# Patient Record
Sex: MALE | Race: WHITE | Employment: OTHER | URBAN - METROPOLITAN AREA
[De-identification: names, ages, dates, MRNs, and addresses within clinical notes are randomized per-mention and may not be internally consistent; named-entity substitution may affect disease eponyms.]

---

## 2020-01-10 PROBLEM — K40.90 INGUINAL HERNIA: Status: ACTIVE | Noted: 2020-01-10

## 2020-01-10 PROBLEM — G45.9 TIA (TRANSIENT ISCHEMIC ATTACK): Status: ACTIVE | Noted: 2019-09-10

## 2020-01-13 PROBLEM — R10.31 INGUINODYNIA, RIGHT: Status: ACTIVE | Noted: 2020-01-13

## 2020-12-16 ENCOUNTER — HOSPITAL ENCOUNTER (EMERGENCY)
Age: 45
Discharge: HOME | End: 2020-12-16
Attending: EMERGENCY MEDICINE
Payer: COMMERCIAL

## 2020-12-16 ENCOUNTER — APPOINTMENT (OUTPATIENT)
Dept: GENERAL RADIOLOGY | Age: 45
End: 2020-12-16
Attending: STUDENT IN AN ORGANIZED HEALTH CARE EDUCATION/TRAINING PROGRAM
Payer: COMMERCIAL

## 2020-12-16 VITALS
RESPIRATION RATE: 15 BRPM | SYSTOLIC BLOOD PRESSURE: 129 MMHG | OXYGEN SATURATION: 100 % | TEMPERATURE: 98.4 F | HEART RATE: 75 BPM | DIASTOLIC BLOOD PRESSURE: 76 MMHG

## 2020-12-16 DIAGNOSIS — M54.50 ACUTE BILATERAL LOW BACK PAIN WITHOUT SCIATICA: Primary | ICD-10-CM

## 2020-12-16 LAB
AMPHET UR QL SCN: NEGATIVE
BARBITURATES UR QL SCN: NEGATIVE
BENZODIAZ UR QL: NEGATIVE
CANNABINOIDS UR QL SCN: NEGATIVE
COCAINE UR QL SCN: NEGATIVE
METHADONE UR QL: NEGATIVE
OPIATES UR QL: NEGATIVE
PCP UR QL: NEGATIVE

## 2020-12-16 PROCEDURE — 74011250637 HC RX REV CODE- 250/637: Performed by: EMERGENCY MEDICINE

## 2020-12-16 PROCEDURE — 81003 URINALYSIS AUTO W/O SCOPE: CPT

## 2020-12-16 PROCEDURE — 74011250636 HC RX REV CODE- 250/636: Performed by: EMERGENCY MEDICINE

## 2020-12-16 PROCEDURE — 99283 EMERGENCY DEPT VISIT LOW MDM: CPT

## 2020-12-16 PROCEDURE — 80307 DRUG TEST PRSMV CHEM ANLYZR: CPT

## 2020-12-16 PROCEDURE — 96372 THER/PROPH/DIAG INJ SC/IM: CPT

## 2020-12-16 PROCEDURE — 72100 X-RAY EXAM L-S SPINE 2/3 VWS: CPT

## 2020-12-16 RX ORDER — KETOROLAC TROMETHAMINE 30 MG/ML
60 INJECTION, SOLUTION INTRAMUSCULAR; INTRAVENOUS
Status: COMPLETED | OUTPATIENT
Start: 2020-12-16 | End: 2020-12-16

## 2020-12-16 RX ORDER — OXYCODONE AND ACETAMINOPHEN 5; 325 MG/1; MG/1
1 TABLET ORAL
Status: COMPLETED | OUTPATIENT
Start: 2020-12-16 | End: 2020-12-16

## 2020-12-16 RX ORDER — METHOCARBAMOL 500 MG/1
500 TABLET, FILM COATED ORAL 3 TIMES DAILY
Qty: 21 TAB | Refills: 0 | Status: SHIPPED | OUTPATIENT
Start: 2020-12-16 | End: 2020-12-23

## 2020-12-16 RX ORDER — NAPROXEN 500 MG/1
500 TABLET ORAL 2 TIMES DAILY WITH MEALS
Qty: 20 TAB | Refills: 0 | Status: SHIPPED | OUTPATIENT
Start: 2020-12-16 | End: 2020-12-26

## 2020-12-16 RX ADMIN — KETOROLAC TROMETHAMINE 60 MG: 30 INJECTION, SOLUTION INTRAMUSCULAR at 14:44

## 2020-12-16 RX ADMIN — OXYCODONE HYDROCHLORIDE AND ACETAMINOPHEN 1 TABLET: 5; 325 TABLET ORAL at 16:00

## 2020-12-16 NOTE — DISCHARGE INSTRUCTIONS
X-rays show prior surgical hardware to be in correct position and no acute bony injury  Pain: Naprosyn  Muscle relaxer: Robaxin  Follow-up with Dr. Piper Duran with unrelieved or not improving pain

## 2020-12-16 NOTE — ED NOTES
I have reviewed discharge instructions with the patient. The patient verbalized understanding. Patient left ED via Discharge Method: ambulatory to Home with family  Opportunity for questions and clarification provided. Patient given 2 scripts. To continue your aftercare when you leave the hospital, you may receive an automated call from our care team to check in on how you are doing. This is a free service and part of our promise to provide the best care and service to meet your aftercare needs.  If you have questions, or wish to unsubscribe from this service please call [phone]. Thank you for Choosing our [facility] Emergency Department.

## 2020-12-30 ENCOUNTER — HOSPITAL ENCOUNTER (EMERGENCY)
Age: 45
Discharge: HOME | End: 2020-12-30
Attending: EMERGENCY MEDICINE
Payer: MEDICARE

## 2020-12-30 ENCOUNTER — APPOINTMENT (OUTPATIENT)
Dept: GENERAL RADIOLOGY | Age: 45
End: 2020-12-30
Attending: EMERGENCY MEDICINE
Payer: MEDICARE

## 2020-12-30 VITALS
HEART RATE: 101 BPM | WEIGHT: 276 LBS | DIASTOLIC BLOOD PRESSURE: 86 MMHG | RESPIRATION RATE: 19 BRPM | SYSTOLIC BLOOD PRESSURE: 124 MMHG | HEIGHT: 73 IN | OXYGEN SATURATION: 96 % | TEMPERATURE: 98.3 F | BODY MASS INDEX: 36.58 KG/M2

## 2020-12-30 DIAGNOSIS — S73.101A HIP SPRAIN, RIGHT, INITIAL ENCOUNTER: Primary | ICD-10-CM

## 2020-12-30 PROCEDURE — 99283 EMERGENCY DEPT VISIT LOW MDM: CPT

## 2020-12-30 PROCEDURE — 73502 X-RAY EXAM HIP UNI 2-3 VIEWS: CPT

## 2020-12-30 PROCEDURE — 96372 THER/PROPH/DIAG INJ SC/IM: CPT

## 2020-12-30 PROCEDURE — 74011250636 HC RX REV CODE- 250/636: Performed by: EMERGENCY MEDICINE

## 2020-12-30 RX ORDER — HYDROMORPHONE HYDROCHLORIDE 1 MG/ML
1 INJECTION, SOLUTION INTRAMUSCULAR; INTRAVENOUS; SUBCUTANEOUS ONCE
Status: COMPLETED | OUTPATIENT
Start: 2020-12-30 | End: 2020-12-30

## 2020-12-30 RX ORDER — HYDROCODONE BITARTRATE AND ACETAMINOPHEN 7.5; 325 MG/1; MG/1
1 TABLET ORAL
Qty: 12 TAB | Refills: 0 | Status: SHIPPED | OUTPATIENT
Start: 2020-12-30 | End: 2021-01-02

## 2020-12-30 RX ADMIN — HYDROMORPHONE HYDROCHLORIDE 1 MG: 1 INJECTION, SOLUTION INTRAMUSCULAR; INTRAVENOUS; SUBCUTANEOUS at 20:49

## 2020-12-30 NOTE — LETTER
[id number] [address] EMERGENCY DEPT 
[address] 
[place] [id number] [phone] Work/School Note Date: 12/30/2020 To Whom It May concern: 
 
Canelo Benavides was seen and treated today in the emergency room by the following provider(s): 
Attending Provider: Vani Hill DO. Canelo Benavides may return to work on 01/02/2020.  
 
Sincerely,

## 2020-12-31 NOTE — ED PROVIDER NOTES
Presents complaining of severe pain in the right hip that began yesterday when he was walking up his steps and felt a pop in the area. He denies any falls or other injuries. The pain is primarily located laterally over the area of the greater trochanter. He is taken some Flexeril and some Motrin which she had received from a previous encounter for sciatica with no relief. Pain is worse with certain positions and movements. He denies any previous trauma to the hip. He denies any low back pain. He denies any paresthesias or bowel or bladder dysfunction. The history is provided by the patient. Hip Pain   This is a new problem. The current episode started yesterday. The problem occurs constantly. The problem has not changed since onset. The pain is present in the right hip. The quality of the pain is described as aching. The pain is at a severity of 8/10. The pain is severe. Pertinent negatives include no numbness, full range of motion, no stiffness, no tingling and no back pain. The symptoms are aggravated by movement, palpation and standing. The treatment provided no relief. There has been no history of extremity trauma.         Past Medical History:   Diagnosis Date    Abdominal pain 01/22/2015    Abrasion of hip 07/28/2015    Bronchitis 01/07/2016    Chronic back pain     Depression     Diabetes ([address]     Type 2 on oral agents    DVT (deep venous thrombosis) (HCC)     Endocrine disease     Hydrocele     Hyperlipidemia     Joint pain     upper arm    Local skin infection 07/28/2015    Lumbago 11/17/2014    Lumbar degenerative disc disease 11/17/2014    Malaise and fatigue 09/03/2014    Obesity     Other forms of angina pectoris ([address] 09/03/2014    Psoriasis     PTSD (post-traumatic stress disorder)     Tachycardia 09/03/2014    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       Past Surgical History:   Procedure Laterality Date    HX APPENDECTOMY  01/2019    Appendectomy     HX HERNIA REPAIR Right 2019    Right inguinal hernia repair with mesh - no records- in [place]      L4-L5 fusion     HX PREMALIG/BENIGN SKIN LESION EXCISION      excision of spider bite scar    IR ANGIOPLASTY VENOUS RT  2018    angiogram of the right - [place]         Family History:   Problem Relation Age of Onset    Diabetes Mother         per medical records dated 2016    Diabetes Father     Stroke Father     Heart Attack Paternal Grandmother     Heart Attack Other     Cancer Neg Hx        Social History     Socioeconomic History    Marital status:      Spouse name: Not on file    Number of children: Not on file    Years of education: Not on file    Highest education level: Not on file   Occupational History    Not on file   Social Needs    Financial resource strain: Not on file    Food insecurity     Worry: Not on file     Inability: Not on file    Transportation needs     Medical: Not on file     Non-medical: Not on file   Tobacco Use    Smoking status: Current Every Day Smoker     Packs/day: 0.50     Last attempt to quit: 3/3/2020     Years since quittin.8    Smokeless tobacco: Never Used   Substance and Sexual Activity    Alcohol use: Yes     Comment: monthy     Drug use: Not Currently    Sexual activity: Yes     Partners: Female     Comment:    Lifestyle    Physical activity     Days per week: Not on file     Minutes per session: Not on file    Stress: Not on file   Relationships    Social connections     Talks on phone: Not on file     Gets together: Not on file     Attends Latter day service: Not on file     Active member of club or organization: Not on file     Attends meetings of clubs or organizations: Not on file     Relationship status: Not on file    Intimate partner violence     Fear of current or ex partner: Not on file     Emotionally abused: Not on file     Physically abused: Not on file     Forced sexual activity: Not on file   Other Topics Concern    Not on file   Social History Narrative    gentleman who is chronically disabled because of depression, anxiety, posttraumatic stress disorder along with chronic low back pain         ALLERGIES: Amitriptyline, Dilaudid [hydromorphone], Seroquel [quetiapine], and Vicodin [hydrocodone-acetaminophen]    Review of Systems   Musculoskeletal: Negative for back pain and stiffness. Neurological: Negative for tingling, weakness and numbness. Vitals:    12/30/20 1945 12/30/20 2009   BP: 124/86    Pulse: (!) 101    Resp: 19    Temp: 98.3 °F (36.8 °C)    SpO2: 96% 96%   Weight: 125.2 kg (276 lb)    Height: 6' 1\" (1.854 m)             Physical Exam  Vitals signs and nursing note reviewed. Constitutional:       General: He is not in acute distress. Appearance: Normal appearance. He is not ill-appearing. HENT:      Head: Normocephalic and atraumatic. Musculoskeletal: Normal range of motion. General: Tenderness present. Comments: Tenderness noted in the right gluteus as well as over the greater trochanter on the right. There is no joint line tenderness over the right hip joint. There is also tenderness with stressing of the psoas muscle. Leg length is equal.  No other abnormalities noted on examination of the right lower extremity. There is no tenderness to the flanks and there is no tenderness to the lumbar spine. Skin:     General: Skin is warm and dry. Capillary Refill: Capillary refill takes less than 2 seconds. Neurological:      General: No focal deficit present. Mental Status: He is alert and oriented to person, place, and time. Mental status is at baseline. Sensory: No sensory deficit. Motor: No weakness. Psychiatric:         Mood and Affect: Mood normal.         Behavior: Behavior normal.         Thought Content:  Thought content normal.          MDM  Number of Diagnoses or Management Options     Amount and/or Complexity of Data Reviewed  Tests in the radiology section of CPT®: ordered and reviewed  Independent visualization of images, tracings, or specimens: yes    Risk of Complications, Morbidity, and/or Mortality  Presenting problems: low  Diagnostic procedures: low  Management options: low    Patient Progress  Patient progress: stable         Procedures

## 2020-12-31 NOTE — ED NOTES
Pt complains of right hip since last night after he \"felt a pop\" while climbing his steps at home. Pt denies falling. Pt in [address] on arrival. Pt arrived with mask from home in place. Pt reports taking Motrin and Flexeril with no relief.

## 2021-01-02 ENCOUNTER — APPOINTMENT (OUTPATIENT)
Dept: GENERAL RADIOLOGY | Age: 46
End: 2021-01-02
Attending: EMERGENCY MEDICINE

## 2021-01-02 ENCOUNTER — HOSPITAL ENCOUNTER (EMERGENCY)
Age: 46
Discharge: HOME | End: 2021-01-02
Attending: EMERGENCY MEDICINE

## 2021-01-02 VITALS
WEIGHT: 276 LBS | DIASTOLIC BLOOD PRESSURE: 92 MMHG | SYSTOLIC BLOOD PRESSURE: 137 MMHG | BODY MASS INDEX: 36.58 KG/M2 | HEIGHT: 73 IN | TEMPERATURE: 99 F | HEART RATE: 103 BPM | OXYGEN SATURATION: 95 % | RESPIRATION RATE: 22 BRPM

## 2021-01-02 DIAGNOSIS — M54.31 SCIATICA OF RIGHT SIDE: Primary | ICD-10-CM

## 2021-01-02 PROCEDURE — 73502 X-RAY EXAM HIP UNI 2-3 VIEWS: CPT

## 2021-01-02 PROCEDURE — 74011636637 HC RX REV CODE- 636/637: Performed by: EMERGENCY MEDICINE

## 2021-01-02 PROCEDURE — 99283 EMERGENCY DEPT VISIT LOW MDM: CPT

## 2021-01-02 PROCEDURE — 74011250636 HC RX REV CODE- 250/636: Performed by: EMERGENCY MEDICINE

## 2021-01-02 PROCEDURE — A9270 NON-COVERED ITEM OR SERVICE: HCPCS | Performed by: EMERGENCY MEDICINE

## 2021-01-02 PROCEDURE — 96372 THER/PROPH/DIAG INJ SC/IM: CPT

## 2021-01-02 RX ORDER — HYDROMORPHONE HYDROCHLORIDE 1 MG/ML
1 INJECTION, SOLUTION INTRAMUSCULAR; INTRAVENOUS; SUBCUTANEOUS ONCE
Status: COMPLETED | OUTPATIENT
Start: 2021-01-02 | End: 2021-01-02

## 2021-01-02 RX ORDER — PREDNISONE 20 MG/1
TABLET ORAL
Qty: 13 TAB | Refills: 0 | Status: SHIPPED | OUTPATIENT
Start: 2021-01-02 | End: 2021-04-26

## 2021-01-02 RX ORDER — PREDNISONE 20 MG/1
TABLET ORAL
Qty: 13 TAB | Refills: 0 | Status: SHIPPED | OUTPATIENT
Start: 2021-01-02 | End: 2021-01-02 | Stop reason: SDUPTHER

## 2021-01-02 RX ADMIN — HYDROMORPHONE HYDROCHLORIDE 1 MG: 1 INJECTION, SOLUTION INTRAMUSCULAR; INTRAVENOUS; SUBCUTANEOUS at 18:32

## 2021-01-02 RX ADMIN — PREDNISONE 60 MG: 10 TABLET ORAL at 18:32

## 2021-01-02 NOTE — ED PROVIDER NOTES
Patient is a 77-year-old male with a history of diabetes, chronic back pain status post back surgery who comes to the ER today complaining of some severe pain in the right buttock and the right hip. The pain then radiates to the right leg. He was seen here in the ER 3 days ago after he felt a \"pop\" in the right hip. He was prescribed some hydrocodone. He states he has been taking NSAIDs and muscle relaxers with no relief. No bowel or bladder incontinence. He states he is allergic to that pain medicine and he did not fill the prescription. The history is provided by the patient. Hip Pain   This is a chronic problem. The current episode started more than 2 days ago. The problem occurs constantly. The problem has been gradually worsening. The pain is present in the right hip. Associated symptoms include limited range of motion and back pain. Pertinent negatives include no neck pain. He has tried OTC pain medications (muscle relaxers) for the symptoms. The treatment provided no relief. There has been no history of extremity trauma.         Past Medical History:   Diagnosis Date    Abdominal pain 01/22/2015    Abrasion of hip 07/28/2015    Bronchitis 01/07/2016    Chronic back pain     Depression     [address] [address]     Type 2 on oral agents    DVT (deep venous thrombosis) (HCC)     Endocrine disease     Hydrocele     Hyperlipidemia     Joint pain     upper arm    Local skin infection 07/28/2015    Lumbago 11/17/2014    Lumbar degenerative disc disease 11/17/2014    Malaise and fatigue 09/03/2014    Obesity     Other forms of angina pectoris ([facility] [address] 09/03/2014    Psoriasis     PTSD (post-traumatic stress disorder)     Tachycardia 09/03/2014    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       Past Surgical History:   Procedure Laterality Date    HX APPENDECTOMY  01/2019    Appendectomy     HX HERNIA REPAIR Right 05/2019    Right inguinal hernia repair with mesh - no records- in [facility]      L4-L5 fusion     HX PREMALIG/BENIGN SKIN LESION EXCISION      excision of spider bite scar    IR ANGIOPLASTY VENOUS RT  2018    angiogram of the right - [place]         Family History:   Problem Relation Age of Onset    Diabetes Mother         per medical records dated 2016    Diabetes Father     Stroke Father     Heart Attack Paternal Grandmother     Heart Attack Other     Cancer Neg Hx        Social History     Socioeconomic History    Marital status:      Spouse name: Not on file    Number of children: Not on file    Years of education: Not on file    Highest education level: Not on file   Occupational History    Not on file   Social Needs    Financial resource strain: Not on file    Food insecurity     Worry: Not on file     Inability: Not on file    Transportation needs     Medical: Not on file     Non-medical: Not on file   Tobacco Use    Smoking status: Current Every Day Smoker     Packs/day: 0.50     Last attempt to quit: 3/3/2020     Years since quittin.8    Smokeless tobacco: Never Used   Substance and Sexual Activity    Alcohol use: Yes     Comment: monthy     Drug use: Not Currently    Sexual activity: Yes     Partners: Female     Comment:    Lifestyle    Physical activity     Days per week: Not on file     Minutes per session: Not on file    Stress: Not on file   Relationships    Social connections     Talks on phone: Not on file     Gets together: Not on file     Attends Sabianist service: Not on file     Active member of club or organization: Not on file     Attends meetings of clubs or organizations: Not on file     Relationship status: Not on file    Intimate partner violence     Fear of current or ex partner: Not on file     Emotionally abused: Not on file     Physically abused: Not on file     Forced sexual activity: Not on file   Other Topics Concern    Not on file   Social History Narrative    gentleman who is chronically disabled because of depression, anxiety, posttraumatic stress disorder along with chronic low back pain         ALLERGIES: Amitriptyline, Dilaudid [hydromorphone], Seroquel [quetiapine], and Vicodin [hydrocodone-acetaminophen]    Review of Systems   Constitutional: Negative for chills, fatigue and fever. HENT: Negative for congestion, rhinorrhea and sore throat. Eyes: Negative for pain, discharge and visual disturbance. Respiratory: Negative for cough and shortness of breath. Cardiovascular: Negative for chest pain and palpitations. Gastrointestinal: Negative for abdominal pain, diarrhea and nausea. Endocrine: Negative for polydipsia and polyuria. Genitourinary: Negative for dysuria, frequency and urgency. Musculoskeletal: Positive for back pain. Negative for neck pain. Skin: Negative for rash. Neurological: Negative for seizures, syncope and weakness. Hematological: Negative. Vitals:    01/02/21 1735   BP: (!) 137/92   Pulse: (!) 103   Resp: 22   Temp: 99 °F (37.2 °C)   SpO2: 95%   Weight: 125.2 kg (276 lb)   Height: 6' 1\" (1.854 m)            Physical Exam  Vitals signs and nursing note reviewed. Constitutional:       Appearance: Normal appearance. He is well-developed. HENT:      Head: Normocephalic and atraumatic. Nose: Nose normal.   Eyes:      Extraocular Movements: Extraocular movements intact. Conjunctiva/sclera: Conjunctivae normal.      Pupils: Pupils are equal, round, and reactive to light. Neck:      Musculoskeletal: Normal range of motion and neck supple. No muscular tenderness. Cardiovascular:      Rate and Rhythm: Normal rate and regular rhythm. Heart sounds: Normal heart sounds. Pulmonary:      Effort: Pulmonary effort is normal.      Breath sounds: Normal breath sounds. Abdominal:      Palpations: Abdomen is soft. Tenderness: There is no abdominal tenderness. There is no guarding or rebound. Musculoskeletal:         General: Tenderness present. Comments: Tender over the right SI joint and the lateral aspect of the right hip. Lymphadenopathy:      Cervical: No cervical adenopathy. Skin:     General: Skin is warm and dry. Findings: No rash. Neurological:      General: No focal deficit present. Mental Status: He is alert and oriented to person, place, and time. GCS: GCS eye subscore is 4. GCS verbal subscore is 5. GCS motor subscore is 6. Cranial Nerves: No cranial nerve deficit. Sensory: No sensory deficit. Motor: Motor function is intact. No weakness. Comments: Pain on straight leg raise of the right leg          MDM  Number of Diagnoses or Management Options  Diagnosis management comments: I wore appropriate PPE throughout this patient's ED visit. Angel Green MD, 6:17 PM    6:54 PM  X-rays of the right hip show arthritis but negative for fracture dislocation    Clinically I think this is more of a sciatica. I will prescribe some prednisone. He will continue with his muscle relaxers. Voice dictation software was used during the making of this note. This software is not perfect and grammatical and other typographical errors may be present. This note has been proofread, but may still contain errors.   Angel Green MD; 1/2/2021 @6:55 PM   ===================================================================             Amount and/or Complexity of Data Reviewed  Tests in the radiology section of CPT®: ordered and reviewed  Review and summarize past medical records: yes  Independent visualization of images, tracings, or specimens: yes    Risk of Complications, Morbidity, and/or Mortality  Presenting problems: low  Diagnostic procedures: low  Management options: low    Patient Progress  Patient progress: improved         Procedures

## 2021-01-02 NOTE — ED TRIAGE NOTES
Pt states pain in right hip after he felt a pop in the hip four days ago. States he was evaluated on 12-30 and given some pain medications but the pain is getting worse and causing cramps in right thigh. States it is getting difficult to ambulate. Pt has a mask in triage.

## 2021-01-02 NOTE — DISCHARGE INSTRUCTIONS
Take the course of steroids as prescribed. Continue with your muscle relaxers. Follow-up with your doctor.

## 2021-01-03 NOTE — ED NOTES
I have reviewed discharge instructions with the patient. The patient verbalized understanding. Patient left ED via Discharge Method: ambulatory to Home with wife. Opportunity for questions and clarification provided. Patient given 1 scripts. To continue your aftercare when you leave the hospital, you may receive an automated call from our care team to check in on how you are doing. This is a free service and part of our promise to provide the best care and service to meet your aftercare needs.  If you have questions, or wish to unsubscribe from this service please call [phone]. Thank you for Choosing our [facility] Emergency Department.

## 2021-01-05 ENCOUNTER — HOSPITAL ENCOUNTER (EMERGENCY)
Age: 46
Discharge: HOME | End: 2021-01-05
Attending: EMERGENCY MEDICINE

## 2021-01-05 VITALS
RESPIRATION RATE: 20 BRPM | BODY MASS INDEX: 36.58 KG/M2 | TEMPERATURE: 98.3 F | DIASTOLIC BLOOD PRESSURE: 89 MMHG | HEART RATE: 80 BPM | OXYGEN SATURATION: 94 % | WEIGHT: 276 LBS | HEIGHT: 73 IN | SYSTOLIC BLOOD PRESSURE: 148 MMHG

## 2021-01-05 DIAGNOSIS — R10.31 ABDOMINAL PAIN, RIGHT LOWER QUADRANT: ICD-10-CM

## 2021-01-05 DIAGNOSIS — G89.29 OTHER CHRONIC PAIN: Primary | ICD-10-CM

## 2021-01-05 LAB
ALBUMIN SERPL-MCNC: 3.8 G/DL (ref 3.5–5)
ALBUMIN/GLOB SERPL: 0.8 {RATIO} (ref 1.2–3.5)
ALP SERPL-CCNC: 143 U/L (ref 50–136)
ALT SERPL-CCNC: 49 U/L (ref 12–65)
ANION GAP SERPL CALC-SCNC: 9 MMOL/L (ref 7–16)
AST SERPL-CCNC: 49 U/L (ref 15–37)
BASOPHILS # BLD: 0.1 K/UL (ref 0–0.2)
BASOPHILS NFR BLD: 1 % (ref 0–2)
BILIRUB SERPL-MCNC: 0.5 MG/DL (ref 0.2–1.1)
BUN SERPL-MCNC: 25 MG/DL (ref 6–23)
CALCIUM SERPL-MCNC: 9.1 MG/DL (ref 8.3–10.4)
CHLORIDE SERPL-SCNC: 96 MMOL/L (ref 98–107)
CO2 SERPL-SCNC: 26 MMOL/L (ref 21–32)
CREAT SERPL-MCNC: 0.82 MG/DL (ref 0.8–1.5)
CRP SERPL-MCNC: 1.4 MG/DL (ref 0–0.9)
DIFFERENTIAL METHOD BLD: ABNORMAL
EOSINOPHIL # BLD: 0.2 K/UL (ref 0–0.8)
EOSINOPHIL NFR BLD: 2 % (ref 0.5–7.8)
ERYTHROCYTE [DISTWIDTH] IN BLOOD BY AUTOMATED COUNT: 13 % (ref 11.9–14.6)
GLOBULIN SER CALC-MCNC: 4.5 G/DL (ref 2.3–3.5)
GLUCOSE SERPL-MCNC: 353 MG/DL (ref 65–100)
HCT VFR BLD AUTO: 45.3 % (ref 41.1–50.3)
HGB BLD-MCNC: 15.8 G/DL (ref 13.6–17.2)
IMM GRANULOCYTES # BLD AUTO: 0.1 K/UL (ref 0–0.5)
IMM GRANULOCYTES NFR BLD AUTO: 1 % (ref 0–5)
LYMPHOCYTES # BLD: 3.4 K/UL (ref 0.5–4.6)
LYMPHOCYTES NFR BLD: 30 % (ref 13–44)
MCH RBC QN AUTO: 30.1 PG (ref 26.1–32.9)
MCHC RBC AUTO-ENTMCNC: 34.9 G/DL (ref 31.4–35)
MCV RBC AUTO: 86.3 FL (ref 79.6–97.8)
MONOCYTES # BLD: 0.7 K/UL (ref 0.1–1.3)
MONOCYTES NFR BLD: 6 % (ref 4–12)
NEUTS SEG # BLD: 7 K/UL (ref 1.7–8.2)
NEUTS SEG NFR BLD: 61 % (ref 43–78)
NRBC # BLD: 0 K/UL (ref 0–0.2)
PLATELET # BLD AUTO: 243 K/UL (ref 150–450)
PMV BLD AUTO: 10.5 FL (ref 9.4–12.3)
POTASSIUM SERPL-SCNC: 5.1 MMOL/L (ref 3.5–5.1)
PROT SERPL-MCNC: 8.3 G/DL (ref 6.3–8.2)
RBC # BLD AUTO: 5.25 M/UL (ref 4.23–5.6)
SODIUM SERPL-SCNC: 131 MMOL/L (ref 136–145)
WBC # BLD AUTO: 11.5 K/UL (ref 4.3–11.1)

## 2021-01-05 PROCEDURE — 74011250636 HC RX REV CODE- 250/636: Performed by: EMERGENCY MEDICINE

## 2021-01-05 PROCEDURE — 99283 EMERGENCY DEPT VISIT LOW MDM: CPT

## 2021-01-05 PROCEDURE — 86140 C-REACTIVE PROTEIN: CPT

## 2021-01-05 PROCEDURE — 85025 COMPLETE CBC W/AUTO DIFF WBC: CPT

## 2021-01-05 PROCEDURE — 96375 TX/PRO/DX INJ NEW DRUG ADDON: CPT

## 2021-01-05 PROCEDURE — 96374 THER/PROPH/DIAG INJ IV PUSH: CPT

## 2021-01-05 PROCEDURE — 80053 COMPREHEN METABOLIC PANEL: CPT

## 2021-01-05 RX ORDER — MORPHINE SULFATE 4 MG/ML
6 INJECTION INTRAVENOUS
Status: COMPLETED | OUTPATIENT
Start: 2021-01-05 | End: 2021-01-05

## 2021-01-05 RX ORDER — ONDANSETRON 2 MG/ML
4 INJECTION INTRAMUSCULAR; INTRAVENOUS
Status: COMPLETED | OUTPATIENT
Start: 2021-01-05 | End: 2021-01-05

## 2021-01-05 RX ADMIN — ONDANSETRON 4 MG: 2 INJECTION INTRAMUSCULAR; INTRAVENOUS at 05:49

## 2021-01-05 RX ADMIN — MORPHINE SULFATE 6 MG: 4 INJECTION INTRAVENOUS at 05:49

## 2021-01-05 NOTE — ED NOTES
I have reviewed discharge instructions with the patient. The patient verbalized understanding. Patient left ED via Discharge Method: ambulatory to Home with wife. Opportunity for questions and clarification provided. Patient given 0 scripts. To continue your aftercare when you leave the hospital, you may receive an automated call from our care team to check in on how you are doing. This is a free service and part of our promise to provide the best care and service to meet your aftercare needs.  If you have questions, or wish to unsubscribe from this service please call [phone]. Thank you for Choosing our [facility] Emergency Department.

## 2021-01-05 NOTE — ED PROVIDER NOTES
Patient presents to the ER with complaints of right lower quadrant abdominal pain. Patient states symptoms started earlier this evening. Describes intense pain. Reports nausea. Denies vomiting. Denies any fevers or chills. The history is provided by the patient. Abdominal Pain   This is a new problem. The current episode started 3 to 5 hours ago. The problem occurs constantly. The pain is located in the RLQ. The quality of the pain is aching and cramping. The pain is at a severity of 4/10. The pain is moderate. Associated symptoms include nausea. Pertinent negatives include no belching, no vomiting, no constipation, no testicular pain and no back pain. Nothing worsens the pain. The pain is relieved by nothing.         Past Medical History:   Diagnosis Date    Abdominal pain 01/22/2015    Abrasion of hip 07/28/2015    Bronchitis 01/07/2016    Chronic back pain     Depression     Diabetes ([facility] [address] [facility]     Type 2 on oral agents    DVT (deep venous thrombosis) (HCC)     Endocrine disease     Hydrocele     Hyperlipidemia     Joint pain     upper arm    Local skin infection 07/28/2015    Lumbago 11/17/2014    Lumbar degenerative disc disease 11/17/2014    Malaise and fatigue 09/03/2014    Obesity     Other forms of angina pectoris ([address] 09/03/2014    Psoriasis     PTSD (post-traumatic stress disorder)     Tachycardia 09/03/2014    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       Past Surgical History:   Procedure Laterality Date    HX APPENDECTOMY  01/2019    Appendectomy     HX HERNIA REPAIR Right 05/2019    Right inguinal hernia repair with mesh - no records- in [place]    [address]      L4-L5 fusion     HX PREMALIG/BENIGN SKIN LESION EXCISION      excision of spider bite scar    IR ANGIOPLASTY VENOUS RT  2018    angiogram of the right - [place]         Family History:   Problem Relation Age of Onset    Diabetes Mother         per medical records dated 01/14/2016    Diabetes Father     Stroke Father     Heart Attack Paternal Grandmother     Heart Attack Other     Cancer Neg Hx        Social History     Socioeconomic History    Marital status:      Spouse name: Not on file    Number of children: Not on file    Years of education: Not on file    Highest education level: Not on file   Occupational History    Not on file   Social Needs    Financial resource strain: Not on file    Food insecurity     Worry: Not on file     Inability: Not on file    Transportation needs     Medical: Not on file     Non-medical: Not on file   Tobacco Use    Smoking status: Current Every Day Smoker     Packs/day: 0.50     Last attempt to quit: 3/3/2020     Years since quittin.8    Smokeless tobacco: Never Used   Substance and Sexual Activity    Alcohol use: Yes     Comment: monthy     Drug use: Not Currently    Sexual activity: Yes     Partners: Female     Comment:    Lifestyle    Physical activity     Days per week: Not on file     Minutes per session: Not on file    Stress: Not on file   Relationships    Social connections     Talks on phone: Not on file     Gets together: Not on file     Attends Faith service: Not on file     Active member of club or organization: Not on file     Attends meetings of clubs or organizations: Not on file     Relationship status: Not on file    Intimate partner violence     Fear of current or ex partner: Not on file     Emotionally abused: Not on file     Physically abused: Not on file     Forced sexual activity: Not on file   Other Topics Concern    Not on file   Social History Narrative    gentleman who is chronically disabled because of depression, anxiety, posttraumatic stress disorder along with chronic low back pain         ALLERGIES: Amitriptyline, Dilaudid [hydromorphone], Seroquel [quetiapine], and Vicodin [hydrocodone-acetaminophen]    Review of Systems   Constitutional: Negative for fatigue and unexpected weight change.    HENT: Negative for congestion, trouble swallowing and voice change. Eyes: Negative for photophobia and visual disturbance. Respiratory: Negative for choking, chest tightness and shortness of breath. Cardiovascular: Negative for palpitations. Gastrointestinal: Positive for abdominal pain and nausea. Negative for constipation and vomiting. Genitourinary: Negative for testicular pain. Musculoskeletal: Negative for back pain and joint swelling. Skin: Negative for color change and pallor. Neurological: Negative for light-headedness and numbness. Hematological: Negative for adenopathy. Does not bruise/bleed easily. Psychiatric/Behavioral: Negative for behavioral problems and confusion. All other systems reviewed and are negative. Vitals:    01/05/21 0447 01/05/21 0448   BP:  (!) 148/89   Pulse: 86 87   Resp:  20   Temp:  98.3 °F (36.8 °C)   SpO2: 95% 99%   Weight:  125.2 kg (276 lb)   Height:  6' 1\" (1.854 m)            Physical Exam  Vitals signs and nursing note reviewed. HENT:      Head: Normocephalic. Nose: Nose normal. No congestion or rhinorrhea. Mouth/Throat:      Mouth: Mucous membranes are moist.   Eyes:      Conjunctiva/sclera: Conjunctivae normal.      Pupils: Pupils are equal, round, and reactive to light. Neck:      Musculoskeletal: Normal range of motion and neck supple. No neck rigidity. Cardiovascular:      Rate and Rhythm: Normal rate and regular rhythm. Pulses: Normal pulses. Heart sounds: Normal heart sounds. No murmur. No friction rub. Pulmonary:      Effort: Pulmonary effort is normal.      Breath sounds: Normal breath sounds. Abdominal:      General: Abdomen is flat. There is no distension. Palpations: Abdomen is soft. There is no mass. Tenderness: There is no abdominal tenderness. Musculoskeletal: Normal range of motion. General: No swelling, deformity or signs of injury. Skin:     General: Skin is warm and dry.       Coloration: Skin is not jaundiced or pale. Findings: No bruising. Neurological:      General: No focal deficit present. Mental Status: He is alert and oriented to person, place, and time. Cranial Nerves: No cranial nerve deficit. Sensory: No sensory deficit. Motor: No weakness. Psychiatric:         Mood and Affect: Mood normal.          MDM  Number of Diagnoses or Management Options  Diagnosis management comments: Concern given patient has had multiple visits to the ER with pain related complaints. We will try to review records. 6:33 AM  Review of records. Patient has had a total of 6 CT scans of his abdomen and within the past year. Plus couple with multiple x-rays of his lumbar spine and right hip. His white count here is only 11.5 without any left shift. His chemistry panel is stable. His blood sugar is mildly elevated. Upon my reassessment he is resting, on his cell phone. At this time I see no indication for any repeat imaging. Discussed with patient and need for him to follow-up with his primary care physician. Particularly given his multiple ER visits for chronic recurrent pain. He has voiced verbal understanding of diagnosis and treatment plan. Amount and/or Complexity of Data Reviewed  Clinical lab tests: ordered and reviewed  Tests in the radiology section of CPT®: reviewed  Decide to obtain previous medical records or to obtain history from someone other than the patient: yes (   36yo M w/ PMHx significant for obesity class 2 (BMI 37), DM2, HLD, psoriasis, hx of TIA, depression/anxiety/ PTSD, prior lumbar fusion,  appendectomy, laparoscopic  right inguinal hernia repair (May 2019 - [place] - Dr Lorena Suarez, [address] [place] [address])      He has visited multiple ERs across the state of [place] since August 2019 for multiple complaints (neck pain, chest pain, back pain, abdominal pain, RLQ pain, right groin pain).  He was referred to surgery after CT scan reported a small fat containing right sided inguinal hernia. Clinically not apparent. This corresponds to cord lipoma / preperitoneal fat rather than a true hernia. Additionally, he had a hernia repair which clinically and on imaging do not show recurrence. I DO NOT advocate for surgical repair of this condition.     I have discussed extensively with patient my concerns about his use of the ER system, concerns for potential drug seeking behavour and need to establish care with a PCP given he has multiple medical conditions that require immediate assistance such as his poorly controlled DM2.        RECOMMENDATIONS     NO indication for surgical intervention      NO prescription provided with this consultation - I think part of his pain is neuropathic in nature given his poorly controlled diabetes and this should improve as his glucose is better controlled     Patient was provided with materials to contact [facility] PCP practice to establish care     The patient was counseled on the dangers of tobacco use, and was advised to quit.   Reviewed strategies to maximize success, including written materials.     35 minutes prior to surgical consultation were used to evaluate prior notes, personally review imaging studies available through [facility] / PACS and access [employer] to assess medical records            Kyung Messina MD  1/13/2020        )  Review and summarize past medical records: yes  Independent visualization of images, tracings, or specimens: yes    Risk of Complications, Morbidity, and/or Mortality  Presenting problems: moderate  Diagnostic procedures: low  Management options: moderate           Procedures                 Results Include:    Recent Results (from the past 24 hour(s))   CBC WITH AUTOMATED DIFF    Collection Time: 01/05/21  4:58 AM   Result Value Ref Range    WBC 11.5 (H) 4.3 - 11.1 K/uL    RBC 5.25 4.23 - 5.6 M/uL    HGB 15.8 13.6 - 17.2 g/dL    HCT 45.3 41.1 - 50.3 %    MCV 86.3 79.6 - 97.8 FL    MCH 30.1 26.1 - 32.9 PG    MCHC 34.9 31.4 - 35.0 g/dL    RDW 13.0 11.9 - 14.6 %    PLATELET 005 309 - 442 K/uL    MPV 10.5 9.4 - 12.3 FL    ABSOLUTE NRBC 0.00 0.0 - 0.2 K/uL    DF AUTOMATED      NEUTROPHILS 61 43 - 78 %    LYMPHOCYTES 30 13 - 44 %    MONOCYTES 6 4.0 - 12.0 %    EOSINOPHILS 2 0.5 - 7.8 %    BASOPHILS 1 0.0 - 2.0 %    IMMATURE GRANULOCYTES 1 0.0 - 5.0 %    ABS. NEUTROPHILS 7.0 1.7 - 8.2 K/UL    ABS. LYMPHOCYTES 3.4 0.5 - 4.6 K/UL    ABS. MONOCYTES 0.7 0.1 - 1.3 K/UL    ABS. EOSINOPHILS 0.2 0.0 - 0.8 K/UL    ABS. BASOPHILS 0.1 0.0 - 0.2 K/UL    ABS. IMM. GRANS. 0.1 0.0 - 0.5 K/UL   METABOLIC PANEL, COMPREHENSIVE    Collection Time: 01/05/21  4:58 AM   Result Value Ref Range    Sodium 131 (L) 136 - 145 mmol/L    Potassium 5.1 3.5 - 5.1 mmol/L    Chloride 96 (L) 98 - 107 mmol/L    CO2 26 21 - 32 mmol/L    Anion gap 9 7 - 16 mmol/L    Glucose 353 (H) 65 - 100 mg/dL    BUN 25 (H) 6 - 23 MG/DL    Creatinine 0.82 0.8 - 1.5 MG/DL    GFR est AA >60 >60 ml/min/1.73m2    GFR est non-AA >60 >60 ml/min/1.73m2    Calcium 9.1 8.3 - 10.4 MG/DL    Bilirubin, total 0.5 0.2 - 1.1 MG/DL    ALT (SGPT) 49 12 - 65 U/L    AST (SGOT) 49 (H) 15 - 37 U/L    Alk. phosphatase 143 (H) 50 - 136 U/L    Protein, total 8.3 (H) 6.3 - 8.2 g/dL    Albumin 3.8 3.5 - 5.0 g/dL    Globulin 4.5 (H) 2.3 - 3.5 g/dL    A-G Ratio 0.8 (L) 1.2 - 3.5         Voice dictation software was used during the making of this note. This software is not perfect and grammatical and other typographical errors may be present. This note has been proofread, but may still contain errors.   Benjie Zhang MD; 1/5/2021 @6:34 AM   ===================================================================

## 2021-01-05 NOTE — DISCHARGE INSTRUCTIONS
As we discussed, it is important that you establish care with a primary care physician for your chronic recurrent pain issues  Your Blood work done today shows no indication of any serious or life-threatening illnesses  Return to the ER for any new, worsening or life-threatening symptoms

## 2021-01-05 NOTE — LETTER
[id number] [address] EMERGENCY DEPT 
[address] [facility] [id number] 
[phone] Work/School Note Date: 1/5/2021 To Whom It May concern: 
 
 
Juan Jaramillo was seen and treated today in the emergency room by the following provider(s): 
Attending Provider: Alee Sorto MD.   
 
Juan Jaramillo is excused from work/school on 01/05/21. He is clear to return to work/school on 01/06/21. Sincerely, Jt Lucas RN

## 2021-01-09 ENCOUNTER — HOSPITAL ENCOUNTER (EMERGENCY)
Age: 46
Discharge: HOME | End: 2021-01-10
Attending: EMERGENCY MEDICINE

## 2021-01-09 ENCOUNTER — APPOINTMENT (OUTPATIENT)
Dept: CT IMAGING | Age: 46
End: 2021-01-09
Attending: EMERGENCY MEDICINE

## 2021-01-09 DIAGNOSIS — R10.30 LOWER ABDOMINAL PAIN: Primary | ICD-10-CM

## 2021-01-09 LAB
ALBUMIN SERPL-MCNC: 4.1 G/DL (ref 3.5–5)
ALBUMIN/GLOB SERPL: 1 {RATIO} (ref 1.2–3.5)
ALP SERPL-CCNC: 150 U/L (ref 50–136)
ALT SERPL-CCNC: 53 U/L (ref 12–65)
ANION GAP SERPL CALC-SCNC: 14 MMOL/L (ref 7–16)
AST SERPL-CCNC: 40 U/L (ref 15–37)
BASOPHILS # BLD: 0 K/UL (ref 0–0.2)
BASOPHILS NFR BLD: 0 % (ref 0–2)
BILIRUB SERPL-MCNC: 0.5 MG/DL (ref 0.2–1.1)
BUN SERPL-MCNC: 15 MG/DL (ref 6–23)
CALCIUM SERPL-MCNC: 9.1 MG/DL (ref 8.3–10.4)
CHLORIDE SERPL-SCNC: 99 MMOL/L (ref 98–107)
CO2 SERPL-SCNC: 24 MMOL/L (ref 21–32)
CREAT SERPL-MCNC: 0.82 MG/DL (ref 0.8–1.5)
DIFFERENTIAL METHOD BLD: NORMAL
EOSINOPHIL # BLD: 0.2 K/UL (ref 0–0.8)
EOSINOPHIL NFR BLD: 2 % (ref 0.5–7.8)
ERYTHROCYTE [DISTWIDTH] IN BLOOD BY AUTOMATED COUNT: 12.8 % (ref 11.9–14.6)
GLOBULIN SER CALC-MCNC: 4 G/DL (ref 2.3–3.5)
GLUCOSE SERPL-MCNC: 376 MG/DL (ref 65–100)
HCT VFR BLD AUTO: 46.6 % (ref 41.1–50.3)
HGB BLD-MCNC: 16.3 G/DL (ref 13.6–17.2)
IMM GRANULOCYTES # BLD AUTO: 0.1 K/UL (ref 0–0.5)
IMM GRANULOCYTES NFR BLD AUTO: 1 % (ref 0–5)
LYMPHOCYTES # BLD: 2.9 K/UL (ref 0.5–4.6)
LYMPHOCYTES NFR BLD: 33 % (ref 13–44)
MCH RBC QN AUTO: 30 PG (ref 26.1–32.9)
MCHC RBC AUTO-ENTMCNC: 35 G/DL (ref 31.4–35)
MCV RBC AUTO: 85.8 FL (ref 79.6–97.8)
MONOCYTES # BLD: 0.4 K/UL (ref 0.1–1.3)
MONOCYTES NFR BLD: 5 % (ref 4–12)
NEUTS SEG # BLD: 5.3 K/UL (ref 1.7–8.2)
NEUTS SEG NFR BLD: 59 % (ref 43–78)
NRBC # BLD: 0 K/UL (ref 0–0.2)
PLATELET # BLD AUTO: 260 K/UL (ref 150–450)
PMV BLD AUTO: 10.4 FL (ref 9.4–12.3)
POTASSIUM SERPL-SCNC: 4.3 MMOL/L (ref 3.5–5.1)
PROT SERPL-MCNC: 8.1 G/DL (ref 6.3–8.2)
RBC # BLD AUTO: 5.43 M/UL (ref 4.23–5.6)
SODIUM SERPL-SCNC: 137 MMOL/L (ref 136–145)
WBC # BLD AUTO: 8.9 K/UL (ref 4.3–11.1)

## 2021-01-09 PROCEDURE — 80053 COMPREHEN METABOLIC PANEL: CPT

## 2021-01-09 PROCEDURE — 74011000258 HC RX REV CODE- 258: Performed by: EMERGENCY MEDICINE

## 2021-01-09 PROCEDURE — 85025 COMPLETE CBC W/AUTO DIFF WBC: CPT

## 2021-01-09 PROCEDURE — 74177 CT ABD & PELVIS W/CONTRAST: CPT

## 2021-01-09 PROCEDURE — 96375 TX/PRO/DX INJ NEW DRUG ADDON: CPT

## 2021-01-09 PROCEDURE — 74011250636 HC RX REV CODE- 250/636: Performed by: EMERGENCY MEDICINE

## 2021-01-09 PROCEDURE — 96374 THER/PROPH/DIAG INJ IV PUSH: CPT

## 2021-01-09 PROCEDURE — 99283 EMERGENCY DEPT VISIT LOW MDM: CPT

## 2021-01-09 PROCEDURE — 74011000636 HC RX REV CODE- 636: Performed by: EMERGENCY MEDICINE

## 2021-01-09 PROCEDURE — 81003 URINALYSIS AUTO W/O SCOPE: CPT

## 2021-01-09 RX ORDER — SODIUM CHLORIDE 0.9 % (FLUSH) 0.9 %
10 SYRINGE (ML) INJECTION
Status: COMPLETED | OUTPATIENT
Start: 2021-01-09 | End: 2021-01-09

## 2021-01-09 RX ORDER — MORPHINE SULFATE 4 MG/ML
4 INJECTION INTRAVENOUS
Status: COMPLETED | OUTPATIENT
Start: 2021-01-09 | End: 2021-01-09

## 2021-01-09 RX ORDER — ONDANSETRON 2 MG/ML
4 INJECTION INTRAMUSCULAR; INTRAVENOUS
Status: COMPLETED | OUTPATIENT
Start: 2021-01-09 | End: 2021-01-09

## 2021-01-09 RX ADMIN — ONDANSETRON 4 MG: 2 INJECTION INTRAMUSCULAR; INTRAVENOUS at 23:25

## 2021-01-09 RX ADMIN — IOPAMIDOL 100 ML: 755 INJECTION, SOLUTION INTRAVENOUS at 23:49

## 2021-01-09 RX ADMIN — MORPHINE SULFATE 4 MG: 4 INJECTION INTRAVENOUS at 23:26

## 2021-01-09 RX ADMIN — SODIUM CHLORIDE 100 ML: 900 INJECTION, SOLUTION INTRAVENOUS at 23:49

## 2021-01-09 RX ADMIN — SODIUM CHLORIDE 1000 ML: 900 INJECTION, SOLUTION INTRAVENOUS at 23:25

## 2021-01-09 RX ADMIN — Medication 10 ML: at 23:49

## 2021-01-10 VITALS
BODY MASS INDEX: 36.58 KG/M2 | RESPIRATION RATE: 16 BRPM | WEIGHT: 276 LBS | SYSTOLIC BLOOD PRESSURE: 131 MMHG | OXYGEN SATURATION: 98 % | HEIGHT: 73 IN | HEART RATE: 89 BPM | DIASTOLIC BLOOD PRESSURE: 83 MMHG | TEMPERATURE: 98.3 F

## 2021-01-10 PROCEDURE — 74011250637 HC RX REV CODE- 250/637: Performed by: EMERGENCY MEDICINE

## 2021-01-10 RX ORDER — ONDANSETRON 4 MG/1
4 TABLET, ORALLY DISINTEGRATING ORAL
Qty: 6 TAB | Refills: 1 | Status: SHIPPED | OUTPATIENT
Start: 2021-01-10 | End: 2021-01-12

## 2021-01-10 RX ORDER — TRAMADOL HYDROCHLORIDE 50 MG/1
50 TABLET ORAL
Qty: 11 TAB | Refills: 0 | Status: SHIPPED | OUTPATIENT
Start: 2021-01-10 | End: 2021-01-13

## 2021-01-10 RX ORDER — TRAMADOL HYDROCHLORIDE 50 MG/1
50 TABLET ORAL
Status: COMPLETED | OUTPATIENT
Start: 2021-01-10 | End: 2021-01-10

## 2021-01-10 RX ADMIN — TRAMADOL HYDROCHLORIDE 50 MG: 50 TABLET ORAL at 00:51

## 2021-01-10 NOTE — ED PROVIDER NOTES
77-year-old gentleman presents with concerns about pain in his lower abdomen that is gotten gradually worse for about a week. With this pain he has had some intermittent nausea 3-4 episodes of nonbloody nonbilious emesis yesterday. [place] He has had no diarrhea, fevers, or chills. No blood in his bowels. He is already had his appendix taken out. No other associated symptoms. Elements of this note were created using speech recognition software. As such, errors of speech recognition may be present.            Past Medical History:   Diagnosis Date    Abdominal pain 01/22/2015    Abrasion of hip 07/28/2015    Bronchitis 01/07/2016    Chronic back pain     Depression     Diabetes ([facility] [address]     Type 2 on oral agents    [facility]) ([facility])     Endocrine disease     Hydrocele     Hyperlipidemia     Joint pain     upper arm    Local skin infection 07/28/2015    Lumbago 11/17/2014    Lumbar degenerative disc disease 11/17/2014    Malaise and fatigue 09/03/2014    Obesity     Other forms of angina pectoris ([facility] [address] 09/03/2014    Psoriasis     PTSD (post-traumatic stress disorder)     Tachycardia 09/03/2014    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       Past Surgical History:   Procedure Laterality Date    HX APPENDECTOMY  01/2019    Appendectomy     HX HERNIA REPAIR Right 05/2019    Right inguinal hernia repair with mesh - no records- in [place]    [address]      L4-L5 fusion     HX PREMALIG/BENIGN SKIN LESION EXCISION      excision of spider bite scar    IR ANGIOPLASTY VENOUS RT  2018    angiogram of the right - [place]         Family History:   Problem Relation Age of Onset    Diabetes Mother         per medical records dated 01/14/2016    Diabetes Father     Stroke Father     Heart Attack Paternal Grandmother     Heart Attack Other     Cancer Neg Hx        Social History     Socioeconomic History    Marital status:      Spouse name: Not on file    Number of children: Not on file    Years of education: Not on file    Highest education level: Not on file   Occupational History    Not on file   Social Needs    Financial resource strain: Not on file    Food insecurity     Worry: Not on file     Inability: Not on file    Transportation needs     Medical: Not on file     Non-medical: Not on file   Tobacco Use    Smoking status: Current Every Day Smoker     Packs/day: 0.50     Last attempt to quit: 3/3/2020     Years since quittin.8    Smokeless tobacco: Never Used   Substance and Sexual Activity    Alcohol use: Yes     Comment: monthy     Drug use: Not Currently    Sexual activity: Yes     Partners: Female     Comment:    Lifestyle    Physical activity     Days per week: Not on file     Minutes per session: Not on file    Stress: Not on file   Relationships    Social connections     Talks on phone: Not on file     Gets together: Not on file     Attends Mandaen service: Not on file     Active member of club or organization: Not on file     Attends meetings of clubs or organizations: Not on file     Relationship status: Not on file    Intimate partner violence     Fear of current or ex partner: Not on file     Emotionally abused: Not on file     Physically abused: Not on file     Forced sexual activity: Not on file   Other Topics Concern    Not on file   Social History Narrative    gentleman who is chronically disabled because of depression, anxiety, posttraumatic stress disorder along with chronic low back pain         ALLERGIES: Amitriptyline, Dilaudid [hydromorphone], Seroquel [quetiapine], and Vicodin [hydrocodone-acetaminophen]    Review of Systems   Constitutional: Negative for chills, diaphoresis and fever. HENT: Negative for congestion, rhinorrhea and sore throat. Eyes: Negative for redness and visual disturbance. Respiratory: Negative for cough, chest tightness, shortness of breath and wheezing.     Cardiovascular: Negative for chest pain and palpitations. Gastrointestinal: Positive for abdominal pain and vomiting. Negative for blood in stool, diarrhea and nausea. Endocrine: Negative for polydipsia and polyuria. Genitourinary: Negative for dysuria and hematuria. Musculoskeletal: Negative for arthralgias, myalgias and neck stiffness. Skin: Negative for rash. Allergic/Immunologic: Negative for environmental allergies and food allergies. Neurological: Negative for dizziness, weakness and headaches. Hematological: Negative for adenopathy. Does not bruise/bleed easily. Psychiatric/Behavioral: Negative for confusion and sleep disturbance. The patient is not nervous/anxious. Vitals:    01/09/21 2045   BP: 132/87   Pulse: 91   Resp: 17   Temp: 98.3 °F (36.8 °C)   SpO2: 97%   Weight: 125.2 kg (276 lb)   Height: 6' 1\" (1.854 m)            Physical Exam  Vitals signs and nursing note reviewed. Constitutional:       Appearance: He is well-developed. HENT:      Head: Normocephalic and atraumatic. Cardiovascular:      Rate and Rhythm: Normal rate and regular rhythm. Pulmonary:      Effort: Pulmonary effort is normal.      Breath sounds: Normal breath sounds. Abdominal:      General: Bowel sounds are normal.   Neurological:      Mental Status: He is alert. [facility]  ED Course as of Jet 10 0150   Polly Castaneda Jet 10, 2021   0147 CT scan is unremarkable except a slightly distended bladder. He did urinate after the scan without any difficulty. He was able to give us a urine sample which shows no significant signs of infection.    [AC]   0148 His blood sugar is mildly elevated but I do not think that is the cause of his symptoms. Chronically slightly elevated AST and alk phos and I do not think those are clinically relevant.   I do not have an exact explanation for his symptoms but I do not find anything urgent or emergent and I will discharge him home.    [AC]      ED Course User Index  [AC] Xuan Villanueva MD       Procedures

## 2021-01-10 NOTE — DISCHARGE INSTRUCTIONS
As we discussed, we did not find the exact cause of your symptoms today in the emergency department. Therefore, it is important for you to follow up with your primary care doctor, practice to which I referred you, for reevaluation. Please return with any fevers, blood in your vomit or your bowels, worsening symptoms, or additional concerns.

## 2021-01-10 NOTE — ED TRIAGE NOTES
Pt c/o lower mid abdominal pain x1 week with increased pain yesterday. Pt states he vomited 3-4 times yesterday and once today. Pt denies diarrhea. Pt in wheelchair in triage.  Pt masked upon arrival to the ED

## 2021-01-10 NOTE — ED NOTES
I have reviewed discharge instructions with the patient. The patient verbalized understanding. Patient left ED via Discharge Method: ambulatory to Home with family. Opportunity for questions and clarification provided. Patient given 2 scripts. To continue your aftercare when you leave the hospital, you may receive an automated call from our care team to check in on how you are doing. This is a free service and part of our promise to provide the best care and service to meet your aftercare needs.  If you have questions, or wish to unsubscribe from this service please call [phone]. Thank you for Choosing our [facility] Emergency Department.

## 2021-01-24 ENCOUNTER — HOSPITAL ENCOUNTER (EMERGENCY)
Age: 46
Discharge: HOME | End: 2021-01-25
Attending: EMERGENCY MEDICINE

## 2021-01-24 ENCOUNTER — APPOINTMENT (OUTPATIENT)
Dept: GENERAL RADIOLOGY | Age: 46
End: 2021-01-24
Attending: EMERGENCY MEDICINE

## 2021-01-24 DIAGNOSIS — I20.9 ANGINA PECTORIS (HCC): Primary | ICD-10-CM

## 2021-01-24 LAB
ALBUMIN SERPL-MCNC: 3.7 G/DL (ref 3.5–5)
ALBUMIN/GLOB SERPL: 1.2 {RATIO} (ref 1.2–3.5)
ALP SERPL-CCNC: 130 U/L (ref 50–136)
ALT SERPL-CCNC: 36 U/L (ref 12–65)
ANION GAP SERPL CALC-SCNC: 13 MMOL/L (ref 7–16)
AST SERPL-CCNC: 22 U/L (ref 15–37)
BASOPHILS # BLD: 0 K/UL (ref 0–0.2)
BASOPHILS NFR BLD: 0 % (ref 0–2)
BILIRUB SERPL-MCNC: 0.4 MG/DL (ref 0.2–1.1)
BUN SERPL-MCNC: 18 MG/DL (ref 6–23)
CALCIUM SERPL-MCNC: 8.5 MG/DL (ref 8.3–10.4)
CHLORIDE SERPL-SCNC: 97 MMOL/L (ref 98–107)
CO2 SERPL-SCNC: 28 MMOL/L (ref 21–32)
CREAT SERPL-MCNC: 0.82 MG/DL (ref 0.8–1.5)
DIFFERENTIAL METHOD BLD: ABNORMAL
EOSINOPHIL # BLD: 0.1 K/UL (ref 0–0.8)
EOSINOPHIL NFR BLD: 1 % (ref 0.5–7.8)
ERYTHROCYTE [DISTWIDTH] IN BLOOD BY AUTOMATED COUNT: 12.6 % (ref 11.9–14.6)
GLOBULIN SER CALC-MCNC: 3.1 G/DL (ref 2.3–3.5)
GLUCOSE SERPL-MCNC: 374 MG/DL (ref 65–100)
HCT VFR BLD AUTO: 44.7 % (ref 41.1–50.3)
HGB BLD-MCNC: 15.7 G/DL (ref 13.6–17.2)
IMM GRANULOCYTES # BLD AUTO: 0.1 K/UL (ref 0–0.5)
IMM GRANULOCYTES NFR BLD AUTO: 1 % (ref 0–5)
LYMPHOCYTES # BLD: 1.9 K/UL (ref 0.5–4.6)
LYMPHOCYTES NFR BLD: 23 % (ref 13–44)
MCH RBC QN AUTO: 29.4 PG (ref 26.1–32.9)
MCHC RBC AUTO-ENTMCNC: 35.1 G/DL (ref 31.4–35)
MCV RBC AUTO: 83.7 FL (ref 79.6–97.8)
MONOCYTES # BLD: 0.6 K/UL (ref 0.1–1.3)
MONOCYTES NFR BLD: 7 % (ref 4–12)
NEUTS SEG # BLD: 5.5 K/UL (ref 1.7–8.2)
NEUTS SEG NFR BLD: 68 % (ref 43–78)
NRBC # BLD: 0 K/UL (ref 0–0.2)
PLATELET # BLD AUTO: 222 K/UL (ref 150–450)
PMV BLD AUTO: 10.2 FL (ref 9.4–12.3)
POTASSIUM SERPL-SCNC: 4.6 MMOL/L (ref 3.5–5.1)
PROT SERPL-MCNC: 6.8 G/DL (ref 6.3–8.2)
RBC # BLD AUTO: 5.34 M/UL (ref 4.23–5.6)
SODIUM SERPL-SCNC: 138 MMOL/L (ref 136–145)
TROPONIN-HIGH SENSITIVITY: 7.8 PG/ML (ref 0–14)
WBC # BLD AUTO: 8.1 K/UL (ref 4.3–11.1)

## 2021-01-24 PROCEDURE — 96361 HYDRATE IV INFUSION ADD-ON: CPT

## 2021-01-24 PROCEDURE — 74011250636 HC RX REV CODE- 250/636: Performed by: EMERGENCY MEDICINE

## 2021-01-24 PROCEDURE — 80053 COMPREHEN METABOLIC PANEL: CPT

## 2021-01-24 PROCEDURE — 74011000250 HC RX REV CODE- 250: Performed by: EMERGENCY MEDICINE

## 2021-01-24 PROCEDURE — 99284 EMERGENCY DEPT VISIT MOD MDM: CPT

## 2021-01-24 PROCEDURE — 85379 FIBRIN DEGRADATION QUANT: CPT

## 2021-01-24 PROCEDURE — 96374 THER/PROPH/DIAG INJ IV PUSH: CPT

## 2021-01-24 PROCEDURE — 85025 COMPLETE CBC W/AUTO DIFF WBC: CPT

## 2021-01-24 PROCEDURE — 93005 ELECTROCARDIOGRAM TRACING: CPT | Performed by: EMERGENCY MEDICINE

## 2021-01-24 PROCEDURE — 83690 ASSAY OF LIPASE: CPT

## 2021-01-24 PROCEDURE — 84484 ASSAY OF TROPONIN QUANT: CPT

## 2021-01-24 PROCEDURE — 96375 TX/PRO/DX INJ NEW DRUG ADDON: CPT

## 2021-01-24 PROCEDURE — 71045 X-RAY EXAM CHEST 1 VIEW: CPT

## 2021-01-24 RX ADMIN — FAMOTIDINE 40 MG: 10 INJECTION, SOLUTION INTRAVENOUS at 23:43

## 2021-01-25 VITALS
RESPIRATION RATE: 16 BRPM | BODY MASS INDEX: 36.58 KG/M2 | DIASTOLIC BLOOD PRESSURE: 68 MMHG | OXYGEN SATURATION: 97 % | SYSTOLIC BLOOD PRESSURE: 116 MMHG | HEIGHT: 73 IN | WEIGHT: 276 LBS | HEART RATE: 95 BPM

## 2021-01-25 LAB
ATRIAL RATE: 101 BPM
CALCULATED P AXIS, ECG09: 65 DEGREES
CALCULATED R AXIS, ECG10: 38 DEGREES
CALCULATED T AXIS, ECG11: 59 DEGREES
D DIMER PPP FEU-MCNC: 0.4 UG/ML(FEU)
DIAGNOSIS, 93000: NORMAL
GLUCOSE BLD STRIP.AUTO-MCNC: 182 MG/DL (ref 65–100)
LIPASE SERPL-CCNC: 136 U/L (ref 73–393)
P-R INTERVAL, ECG05: 154 MS
Q-T INTERVAL, ECG07: 320 MS
QRS DURATION, ECG06: 86 MS
QTC CALCULATION (BEZET), ECG08: 414 MS
TROPONIN-HIGH SENSITIVITY: 6.6 PG/ML (ref 0–14)
VENTRICULAR RATE, ECG03: 101 BPM

## 2021-01-25 PROCEDURE — 74011000250 HC RX REV CODE- 250: Performed by: EMERGENCY MEDICINE

## 2021-01-25 PROCEDURE — 74011636637 HC RX REV CODE- 636/637: Performed by: EMERGENCY MEDICINE

## 2021-01-25 PROCEDURE — 84484 ASSAY OF TROPONIN QUANT: CPT

## 2021-01-25 PROCEDURE — 74011250636 HC RX REV CODE- 250/636: Performed by: EMERGENCY MEDICINE

## 2021-01-25 PROCEDURE — 74011250637 HC RX REV CODE- 250/637: Performed by: EMERGENCY MEDICINE

## 2021-01-25 PROCEDURE — 82962 GLUCOSE BLOOD TEST: CPT

## 2021-01-25 RX ORDER — LIDOCAINE HYDROCHLORIDE 20 MG/ML
15 SOLUTION OROPHARYNGEAL
Status: COMPLETED | OUTPATIENT
Start: 2021-01-25 | End: 2021-01-25

## 2021-01-25 RX ORDER — MAG HYDROX/ALUMINUM HYD/SIMETH 200-200-20
30 SUSPENSION, ORAL (FINAL DOSE FORM) ORAL
Status: COMPLETED | OUTPATIENT
Start: 2021-01-25 | End: 2021-01-25

## 2021-01-25 RX ADMIN — INSULIN HUMAN 10 UNITS: 100 INJECTION, SOLUTION PARENTERAL at 01:25

## 2021-01-25 RX ADMIN — SODIUM CHLORIDE 1000 ML: 900 INJECTION, SOLUTION INTRAVENOUS at 01:24

## 2021-01-25 RX ADMIN — ALUMINUM HYDROXIDE, MAGNESIUM HYDROXIDE, SIMETHICONE 30 ML: 200; 200; 20 LIQUID ORAL at 01:25

## 2021-01-25 RX ADMIN — LIDOCAINE HYDROCHLORIDE 15 ML: 20 SOLUTION ORAL; TOPICAL at 01:25

## 2021-01-25 NOTE — ED PROVIDER NOTES
53-year-old male presenting for intermittent chest discomfort and shortness of breath upon exertion. Symptoms started several days ago. Still going on now  not short of breath at rest  has not smoked in 7 days but denies alcohol and drugs  reports history of heart problems but denies having a heart doctor      The history is provided by the patient. Chest Pain (Angina)   This is a new problem. The current episode started 2 days ago. The problem has not changed since onset. The problem occurs daily. The pain is associated with exertion. The pain is present in the substernal region and epigastric region. The pain is at a severity of 3/10. The pain is mild. The quality of the pain is described as pressure-like. The pain does not radiate. The symptoms are aggravated by movement. Associated symptoms include abdominal pain and shortness of breath. Pertinent negatives include no back pain, no claudication, no cough, no diaphoresis, no dizziness, no exertional chest pressure, no fever, no headaches, no hemoptysis, no irregular heartbeat, no leg pain, no lower extremity edema, no malaise/fatigue, no nausea, no near-syncope, no numbness, no orthopnea, no palpitations, no PND, no sputum production, no vomiting and no weakness. He has tried nothing for the symptoms. The treatment provided no relief. Risk factors include dyslipidemia, hypertension and male gender. His past medical history is significant for HTN. Pertinent negatives include no cardiac catheterization, no echocardiogram, no EPS study, no persantine thallium, no stress echo, no stress thallium, no exercise treadmill test, no cardiac stents, no angioplasty, no Caesar stents, no pacemaker, no AICD and no CABG. Shortness of Breath  Associated symptoms include chest pain and abdominal pain. Pertinent negatives include no fever, no headaches, no cough, no sputum production, no hemoptysis, no PND, no orthopnea, no vomiting, no leg pain and no claudication. Past Medical History:   Diagnosis Date    Abdominal pain 2015    Abrasion of hip 2015    Bronchitis 2016    Chronic back pain     Depression     Diabetes ([facility] [address]     Type 2 on oral agents    DVT (deep venous thrombosis) (HCC)     Endocrine disease     Hydrocele     Hyperlipidemia     Joint pain     upper arm    Local skin infection 2015    Lumbago 2014    Lumbar degenerative disc disease 2014    Malaise and fatigue 2014    Obesity     Other forms of angina pectoris ([facility] [address] 2014    Psoriasis     PTSD (post-traumatic stress disorder)     Tachycardia 2014    Ulnar nerve lesion 2015       Past Surgical History:   Procedure Laterality Date    HX APPENDECTOMY  2019    Appendectomy     HX HERNIA REPAIR Right 2019    Right inguinal hernia repair with mesh - no records- in [place]      L4-L5 fusion     HX PREMALIG/BENIGN SKIN LESION EXCISION      excision of spider bite scar    IR ANGIOPLASTY VENOUS RT  2018    angiogram of the right - [place]         Family History:   Problem Relation Age of Onset    Diabetes Mother         per medical records dated 2016    Diabetes Father     Stroke Father     Heart Attack Paternal Grandmother     Heart Attack Other     Cancer Neg Hx        Social History     Socioeconomic History    Marital status:      Spouse name: Not on file    Number of children: Not on file    Years of education: Not on file    Highest education level: Not on file   Occupational History    Not on file   Social Needs    Financial resource strain: Not on file    Food insecurity     Worry: Not on file     Inability: Not on file    Transportation needs     Medical: Not on file     Non-medical: Not on file   Tobacco Use    Smoking status: Current Every Day Smoker     Packs/day: 0.50     Last attempt to quit: 3/3/2020     Years since quittin.8    Smokeless tobacco: Never Used Substance and Sexual Activity    Alcohol use: Yes     Comment: monthy     Drug use: Not Currently    Sexual activity: Yes     Partners: Female     Comment:    Lifestyle    Physical activity     Days per week: Not on file     Minutes per session: Not on file    Stress: Not on file   Relationships    Social connections     Talks on phone: Not on file     Gets together: Not on file     Attends Hoahaoism service: Not on file     Active member of club or organization: Not on file     Attends meetings of clubs or organizations: Not on file     Relationship status: Not on file    Intimate partner violence     Fear of current or ex partner: Not on file     Emotionally abused: Not on file     Physically abused: Not on file     Forced sexual activity: Not on file   Other Topics Concern    Not on file   Social History Narrative    gentleman who is chronically disabled because of depression, anxiety, posttraumatic stress disorder along with chronic low back pain         ALLERGIES: Amitriptyline, Dilaudid [hydromorphone], Seroquel [quetiapine], and Vicodin [hydrocodone-acetaminophen]    Review of Systems   Constitutional: Negative for diaphoresis, fever and malaise/fatigue. Respiratory: Positive for shortness of breath. Negative for cough, hemoptysis and sputum production. Cardiovascular: Positive for chest pain. Negative for palpitations, orthopnea, claudication, PND and near-syncope. Gastrointestinal: Positive for abdominal pain. Negative for nausea and vomiting. Musculoskeletal: Negative for back pain. Neurological: Negative for dizziness, weakness, numbness and headaches. All other systems reviewed and are negative. Vitals:    01/24/21 2226   BP: 121/70   Pulse: (!) 101   Resp: 17   SpO2: 95%   Weight: 125.2 kg (276 lb)   Height: 6' 1\" (1.854 m)            Physical Exam  Vitals signs and nursing note reviewed. Constitutional:       Appearance: He is well-developed.    HENT:      Head: Normocephalic and atraumatic. Eyes:      Conjunctiva/sclera: Conjunctivae normal.      Pupils: Pupils are equal, round, and reactive to light. Neck:      Musculoskeletal: Normal range of motion and neck supple. Cardiovascular:      Rate and Rhythm: Normal rate and regular rhythm. Heart sounds: Normal heart sounds. Pulmonary:      Effort: Pulmonary effort is normal.      Breath sounds: Normal breath sounds. Abdominal:      General: Bowel sounds are normal.      Palpations: Abdomen is soft. Musculoskeletal: Normal range of motion. General: No deformity. Skin:     General: Skin is warm and dry. Neurological:      Mental Status: He is alert and oriented to person, place, and time. Cranial Nerves: No cranial nerve deficit. Psychiatric:         Behavior: Behavior normal.          MDM  Number of Diagnoses or Management Options  Angina pectoris [facility])  Diagnosis management comments: 79-year-old male presenting for chest discomfort during exertion concerning for what sounds like stable angina.     He also reports a recent diagnosis of pancreatitis but denies drinking or drugs       Amount and/or Complexity of Data Reviewed  Clinical lab tests: ordered and reviewed (Results for orders placed or performed during the hospital encounter of 01/24/21  -CBC WITH AUTOMATED DIFF       Result                      Value             Ref Range           WBC                         8.1               4.3 - 11.1 K*       RBC                         5.34              4.23 - 5.6 M*       HGB                         15.7              13.6 - 17.2 *       HCT                         44.7              41.1 - 50.3 %       MCV                         83.7              79.6 - 97.8 *       MCH                         29.4              26.1 - 32.9 *       MCHC                        35.1 (H)          31.4 - 35.0 *       RDW                         12.6              11.9 - 14.6 %       PLATELET                    222 150 - 450 K/*       MPV                         10.2              9.4 - 12.3 FL       ABSOLUTE NRBC               0.00              0.0 - 0.2 K/*       DF                          AUTOMATED                             NEUTROPHILS                 68                43 - 78 %           LYMPHOCYTES                 23                13 - 44 %           MONOCYTES                   7                 4.0 - 12.0 %        EOSINOPHILS                 1                 0.5 - 7.8 %         BASOPHILS                   0                 0.0 - 2.0 %         IMMATURE GRANULOCYTES       1                 0.0 - 5.0 %         ABS. NEUTROPHILS            5.5               1.7 - 8.2 K/*       ABS. LYMPHOCYTES            1.9               0.5 - 4.6 K/*       ABS. MONOCYTES              0.6               0.1 - 1.3 K/*       ABS. EOSINOPHILS            0.1               0.0 - 0.8 K/*       ABS. BASOPHILS              0.0               0.0 - 0.2 K/*       ABS. IMM.  GRANS.            0.1               0.0 - 0.5 K/*  -METABOLIC PANEL, COMPREHENSIVE       Result                      Value             Ref Range           Sodium                      138               136 - 145 mm*       Potassium                   4.6               3.5 - 5.1 mm*       Chloride                    97 (L)            98 - 107 mmo*       CO2                         28                21 - 32 mmol*       Anion gap                   13                7 - 16 mmol/L       Glucose                     374 (H)           65 - 100 mg/*       BUN                         18                6 - 23 MG/DL        Creatinine                  0.82              0.8 - 1.5 MG*       GFR est AA                  >60               >60 ml/min/1*       GFR est non-AA              >60               >60 ml/min/1*       Calcium                     8.5               8.3 - 10.4 M*       Bilirubin, total            0.4               0.2 - 1.1 MG*       ALT (SGPT)                  36 12 - 65 U/L         AST (SGOT)                  22                15 - 37 U/L         Alk. phosphatase            130               50 - 136 U/L        Protein, total              6.8               6.3 - 8.2 g/*       Albumin                     3.7               3.5 - 5.0 g/*       Globulin                    3.1               2.3 - 3.5 g/*       A-G Ratio                   1.2               1.2 - 3.5      -TROPONIN-HIGH SENSITIVITY       Result                      Value             Ref Range           Troponin-High Sensitiv*     7.8               0 - 14 pg/mL   -TROPONIN-HIGH SENSITIVITY       Result                      Value             Ref Range           Troponin-High Sensitiv*     6.6               0 - 14 pg/mL   -LIPASE       Result                      Value             Ref Range           Lipase                      136               73 - 393 U/L   -D DIMER       Result                      Value             Ref Range           D DIMER                     0.40              <0.56 ug/ml(*  )  Tests in the radiology section of CPT®: ordered and reviewed (Xr Hip Rt W Or Wo Pelv 2-3 Vws    Result Date: 1/2/2021  Right hip CLINICAL INDICATION: Right hip pain after feeling a pop FINDINGS: Three views of the right hip show hypertrophy of the right acetabulum with narrowing of the joint space in keeping with mild osteoarthritis. There is no acute fracture. The SI joints are intact. The pubic symphysis is intact. Posterior lumbar fusion hardware is noted in the lower lumbar spine. IMPRESSION: Mild osteoarthritis of the right hip joint. No fracture appreciated. Xr Hip Rt W Or Wo Pelv 2-3 Vws    Result Date: 12/30/2020  PELVIS AND RIGHT HIP, 3 views. HISTORY: Hip pain without trauma. TECHNIQUE: AP view of the pelvis and coned down AP and frogleg lateral of the hip. FINDINGS: -Bony pelvic ring: Appears intact. -SI joints: Appear symmetric. -Pubic rami: Appear intact.  -Femoral head:  Has a round smooth contour. -Joint space: Symmetric. There are acetabular osteophytes. -Femoral neck and proximal femoral shaft:  Appear intact. -Lower lumbar spine: Lower lumbar fusion. IMPRESSION:  No acute abnormality. Bilateral hip osteoarthritis. Ct Abd Pelv W Cont    Result Date: 1/10/2021  CT ABDOMEN AND PELVIS WITH CONTRAST HISTORY: CT ABDOMEN AND PELVIS WITH CONTRAST HISTORY: Left lower quadrant abdominal pain COMPARISON: None TECHNIQUE: Helical imaging was performed from the lung bases through the ischial tuberosities during the intravenous infusion of 100 cc of Isovue-370. Oral contrast was not administered. Coronal and sagittal reformats were performed. Dose reduction techniques used: Automated exposure control, adjustment of the mAs and/or kVp according to patient's size, and iterative reconstruction techniques. FINDINGS: *  LUNG BASES: Coronary artery disease. *  LIVER: Hepatomegaly with diffuse fatty change. *  GALLBLADDER AND BILE DUCTS: Normal. *  SPLEEN: Within normal limits. *  URINARY TRACT: Within normal limits. *  ADRENALS: Within normal limits. *  PANCREAS: Within normal limits. *  GASTROINTESTINAL TRACT: Appendectomy. *  RETROPERITONEUM: Within normal limits. *  PERITONEAL CAVITY AND ABDOMINAL WALL: Within normal limits. *  PELVIS: Distended urinary bladder. *  SPINE / BONES: Status post instrumented spine fusion. *  OTHER COMMENTS: None. IMPRESSION: Distended urinary bladder. Hepatomegaly with diffuse fatty change. Coronary artery disease. Date of Dictation: 1/10/2021 12:04 AM     Xr Chest Port    Result Date: 1/24/2021  Portable chest xray  COMPARISON: none. INDICATION: chest pain FINDINGS: There is no focal pulmonary consolidation, pleural effusion or pneumothorax. No pulmonary edema. Cardiomediastinal contour is within normal limits. Surrounding bones are unremarkable.      1. No pulmonary consolidation or pulmonary edema.     )  Tests in the medicine section of CPT®: ordered and reviewed  Independent visualization of images, tracings, or specimens: yes    Risk of Complications, Morbidity, and/or Mortality  Presenting problems: high  Diagnostic procedures: high  Management options: moderate  General comments: I personally reviewed the patient's vital signs, laboratory tests, and/or radiological findings. I discussed these findings with the patient and their significance. I answered all questions and gave the patient clear return precautions. The patient was discharged from the emergency department in stable condition        Patient Progress  Patient progress: improved    ED Course as of Jan 25 0156   Mon Jan 25, 2021   2876 Patient's D-dimer is negative. Troponin is negative. Glucose slightly elevated so treating with fluids and small dose of insulin.     [JS]      ED Course User Index  [JS] Keely Pereyra MD       Procedures

## 2021-01-25 NOTE — ED NOTES
Pt presents to the ED for c/o chest pain for 3 days. Denies any nausea or vomiting due to the pain.   Pt does have a hx of cardiac problems

## 2021-01-25 NOTE — ED NOTES
I have reviewed discharge instructions with the patient. The patient verbalized understanding. Patient left ED via Discharge Method: ambulatory to SNF with , self,). Opportunity for questions and clarification provided. Patient given 0 scripts. To continue your aftercare when you leave the hospital, you may receive an automated call from our care team to check in on how you are doing. This is a free service and part of our promise to provide the best care and service to meet your aftercare needs.  If you have questions, or wish to unsubscribe from this service please call [phone]. Thank you for Choosing our [facility] Emergency Department.

## 2021-01-25 NOTE — DISCHARGE INSTRUCTIONS
There is no indication that you are having any damage to your heart but it could be that you are developing some worsening coronary artery disease. Start a daily aspirin and contact [facility] cardiology for follow-up.

## 2021-01-25 NOTE — PROGRESS NOTES
Pt states that he doesn't feel any better and continues to have pain.  Dr Mott Many made aware and orders written

## 2021-01-25 NOTE — ED TRIAGE NOTES
Pt c/o intermittent chest pain and shortness of breath x3 days with constant pain today. Pt masked upon arrival to the ED.

## 2021-02-28 ENCOUNTER — APPOINTMENT (OUTPATIENT)
Dept: GENERAL RADIOLOGY | Age: 46
End: 2021-02-28
Attending: STUDENT IN AN ORGANIZED HEALTH CARE EDUCATION/TRAINING PROGRAM
Payer: MEDICARE

## 2021-02-28 ENCOUNTER — HOSPITAL ENCOUNTER (EMERGENCY)
Age: 46
Discharge: HOME | End: 2021-02-28
Attending: STUDENT IN AN ORGANIZED HEALTH CARE EDUCATION/TRAINING PROGRAM
Payer: MEDICARE

## 2021-02-28 VITALS
WEIGHT: 276 LBS | OXYGEN SATURATION: 96 % | BODY MASS INDEX: 36.58 KG/M2 | RESPIRATION RATE: 18 BRPM | DIASTOLIC BLOOD PRESSURE: 83 MMHG | HEART RATE: 94 BPM | TEMPERATURE: 98.5 F | SYSTOLIC BLOOD PRESSURE: 126 MMHG | HEIGHT: 73 IN

## 2021-02-28 DIAGNOSIS — S40.022A ARM CONTUSION, LEFT, INITIAL ENCOUNTER: Primary | ICD-10-CM

## 2021-02-28 PROCEDURE — 99283 EMERGENCY DEPT VISIT LOW MDM: CPT

## 2021-02-28 PROCEDURE — 73110 X-RAY EXAM OF WRIST: CPT

## 2021-02-28 PROCEDURE — 73090 X-RAY EXAM OF FOREARM: CPT

## 2021-02-28 RX ORDER — NAPROXEN SODIUM 550 MG/1
550 TABLET ORAL 2 TIMES DAILY WITH MEALS
Qty: 10 TAB | Refills: 0 | Status: SHIPPED | OUTPATIENT
Start: 2021-02-28

## 2021-03-01 NOTE — DISCHARGE INSTRUCTIONS
Take naproxen as needed for arm pain. Follow-up with primary care physician as needed. Return to the ER for worsening or worrisome symptoms.

## 2021-03-01 NOTE — ED PROVIDER NOTES
40-year-old male presents to emergency department after falling at home. States he fell onto his outstretched left upper extremity. Reports pain to his forearm as well as wrist.  Reports decreased range of motion secondary to pain. Normal sensation to fingers. Denies any other injury. Denies any his head nor loss of consciousness. Denies prior injuries to his left upper extremity. No pain to the shoulder. Able to extend and flex the elbow without difficulty.            Past Medical History:   Diagnosis Date    Abdominal pain 01/22/2015    Abrasion of hip 07/28/2015    Bronchitis 01/07/2016    Chronic back pain     Depression     Diabetes ([facility] [address]     Type 2 on oral agents    DVT [facility] thrombosis) ([facility])     Endocrine disease     Hydrocele     Hyperlipidemia     Joint pain     upper arm    Local skin infection 07/28/2015    Lumbago 11/17/2014    Lumbar degenerative disc disease 11/17/2014    Malaise and fatigue 09/03/2014    Obesity     Other forms of angina pectoris ([facility] [address] 09/03/2014    Psoriasis     PTSD (post-traumatic stress disorder)     Tachycardia 09/03/2014    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       Past Surgical History:   Procedure Laterality Date    HX APPENDECTOMY  01/2019    Appendectomy     HX HERNIA REPAIR Right 05/2019    Right inguinal hernia repair with mesh - no records- in [place]    [address]      L4-L5 fusion     HX PREMALIG/BENIGN SKIN LESION EXCISION      excision of spider bite scar    IR ANGIOPLASTY VENOUS RT  2018    angiogram of the right - [place]         Family History:   Problem Relation Age of Onset    Diabetes Mother         per medical records dated 01/14/2016    Diabetes Father     Stroke Father     Heart Attack Paternal Grandmother     Heart Attack Other     Cancer Neg Hx        Social History     Socioeconomic History    Marital status:      Spouse name: Not on file    Number of children: Not on file    Years of education: Not on file    Highest education level: Not on file   Occupational History    Not on file   Social Needs    Financial resource strain: Not on file    Food insecurity     Worry: Not on file     Inability: Not on file    Transportation needs     Medical: Not on file     Non-medical: Not on file   Tobacco Use    Smoking status: Current Every Day Smoker     Packs/day: 0.50     Last attempt to quit: 3/3/2020     Years since quittin.9    Smokeless tobacco: Never Used   Substance and Sexual Activity    Alcohol use: Yes     Comment: monthy     Drug use: Not Currently    Sexual activity: Yes     Partners: Female     Comment:    Lifestyle    Physical activity     Days per week: Not on file     Minutes per session: Not on file    Stress: Not on file   Relationships    Social connections     Talks on phone: Not on file     Gets together: Not on file     Attends Yazidism service: Not on file     Active member of club or organization: Not on file     Attends meetings of clubs or organizations: Not on file     Relationship status: Not on file    Intimate partner violence     Fear of current or ex partner: Not on file     Emotionally abused: Not on file     Physically abused: Not on file     Forced sexual activity: Not on file   Other Topics Concern    Not on file   Social History Narrative    gentleman who is chronically disabled because of depression, anxiety, posttraumatic stress disorder along with chronic low back pain         ALLERGIES: Amitriptyline, Dilaudid [hydromorphone], Seroquel [quetiapine], and Vicodin [hydrocodone-acetaminophen]    Review of Systems   Constitutional: Negative for chills and fever. HENT: Negative for congestion, rhinorrhea, sinus pressure and sore throat. Eyes: Negative for visual disturbance. Respiratory: Negative for cough and shortness of breath. Cardiovascular: Negative for chest pain.    Gastrointestinal: Negative for abdominal pain, blood in stool, diarrhea, nausea and vomiting. Endocrine: Negative for polyuria. Genitourinary: Negative for difficulty urinating and dysuria. Musculoskeletal: Positive for arthralgias. Negative for neck pain and neck stiffness. Skin: Negative for color change and rash. Neurological: Negative for syncope, speech difficulty, weakness, numbness and headaches. Psychiatric/Behavioral: Negative for behavioral problems, confusion and suicidal ideas. All other systems reviewed and are negative. Vitals:    02/28/21 2020   BP: 126/83   Pulse: 99   Resp: 18   Temp: 98.5 °F (36.9 °C)   SpO2: 96%   Weight: 125.2 kg (276 lb)   Height: 6' 1\" (1.854 m)            Physical Exam  Vitals signs and nursing note reviewed. Constitutional:       Appearance: Normal appearance. HENT:      Head: Normocephalic and atraumatic. Nose: Nose normal.      Mouth/Throat:      Mouth: Mucous membranes are moist.   Eyes:      Extraocular Movements: Extraocular movements intact. Neck:      Musculoskeletal: Normal range of motion. Pulmonary:      Effort: Pulmonary effort is normal. No respiratory distress. Abdominal:      General: Abdomen is flat. Musculoskeletal: Normal range of motion. General: Tenderness present. No swelling or deformity. Comments: Full range of motion of extremities except:    Left upper extremity: No pain to the shoulder or elbow, full range of motion of both of these joints. Patient reports diffuse pain to his forearm after fall on outstretched hand. Reports pain to the wrist with decreased range of motion secondary pain. There is no evidence of trauma, no ecchymosis, no edema. Neurovascular intact distally. Able to wiggle fingers without difficulty. Skin:     General: Skin is warm and dry. Neurological:      General: No focal deficit present. Mental Status: He is alert and oriented to person, place, and time.    Psychiatric:         Mood and Affect: Mood normal.          MDM  Number of Diagnoses or Management Options  Arm contusion, left, initial encounter  Diagnosis management comments: 58-year-old male presents after fall on outstretched hand. Reports pain to his wrist as well as forearm. Patient with neurovascular and tach. No pain to the shoulder or elbow. X-ray of the forearm did not reveal any evidence of fracture or dislocation. X-ray of the wrist also was normal.  Patient with some improved movement after I informed him that his test for negative for fracture or injury. Patient will be prescribed naproxen to take as needed for pain. Follow-up with primary care physician next week as needed. Return to the ER for worsening or worrisome symptoms. He voiced understanding agreement with this plan.        Amount and/or Complexity of Data Reviewed  Tests in the radiology section of CPT®: ordered and reviewed           Procedures

## 2021-03-01 NOTE — ED NOTES
I have reviewed discharge instructions with the patient. The patient verbalized understanding. Patient left ED via Discharge Method: ambulatory to Home with self. Opportunity for questions and clarification provided. Patient given 1 scripts. To continue your aftercare when you leave the hospital, you may receive an automated call from our care team to check in on how you are doing. This is a free service and part of our promise to provide the best care and service to meet your aftercare needs.  If you have questions, or wish to unsubscribe from this service please call [phone]. Thank you for Choosing our [facility] Emergency Department.

## 2021-04-26 ENCOUNTER — HOSPITAL ENCOUNTER (EMERGENCY)
Age: 46
Discharge: HOME | End: 2021-04-27
Attending: EMERGENCY MEDICINE
Payer: MEDICAID

## 2021-04-26 ENCOUNTER — APPOINTMENT (OUTPATIENT)
Dept: GENERAL RADIOLOGY | Age: 46
End: 2021-04-26
Attending: EMERGENCY MEDICINE
Payer: MEDICAID

## 2021-04-26 DIAGNOSIS — F41.1 ANXIETY STATE: Primary | ICD-10-CM

## 2021-04-26 DIAGNOSIS — R07.9 CHEST PAIN, UNSPECIFIED TYPE: ICD-10-CM

## 2021-04-26 DIAGNOSIS — Z79.4 TYPE 2 DIABETES MELLITUS WITH HYPERGLYCEMIA, WITH LONG-TERM CURRENT USE OF INSULIN (HCC): ICD-10-CM

## 2021-04-26 DIAGNOSIS — E11.65 TYPE 2 DIABETES MELLITUS WITH HYPERGLYCEMIA, WITH LONG-TERM CURRENT USE OF INSULIN (HCC): ICD-10-CM

## 2021-04-26 LAB
ALBUMIN SERPL-MCNC: 3.6 G/DL (ref 3.5–5)
ALBUMIN/GLOB SERPL: 0.8 {RATIO} (ref 1.2–3.5)
ALP SERPL-CCNC: 157 U/L (ref 50–136)
ALT SERPL-CCNC: 41 U/L (ref 12–65)
ANION GAP SERPL CALC-SCNC: 9 MMOL/L (ref 7–16)
AST SERPL-CCNC: 23 U/L (ref 15–37)
BASOPHILS # BLD: 0 K/UL (ref 0–0.2)
BASOPHILS NFR BLD: 0 % (ref 0–2)
BILIRUB SERPL-MCNC: 0.4 MG/DL (ref 0.2–1.1)
BUN SERPL-MCNC: 21 MG/DL (ref 6–23)
CALCIUM SERPL-MCNC: 8.8 MG/DL (ref 8.3–10.4)
CHLORIDE SERPL-SCNC: 96 MMOL/L (ref 98–107)
CO2 SERPL-SCNC: 26 MMOL/L (ref 21–32)
CREAT SERPL-MCNC: 0.93 MG/DL (ref 0.8–1.5)
DIFFERENTIAL METHOD BLD: ABNORMAL
EOSINOPHIL # BLD: 0.2 K/UL (ref 0–0.8)
EOSINOPHIL NFR BLD: 2 % (ref 0.5–7.8)
ERYTHROCYTE [DISTWIDTH] IN BLOOD BY AUTOMATED COUNT: 12.8 % (ref 11.9–14.6)
GLOBULIN SER CALC-MCNC: 4.3 G/DL (ref 2.3–3.5)
GLUCOSE SERPL-MCNC: 574 MG/DL (ref 65–100)
HCT VFR BLD AUTO: 45.9 % (ref 41.1–50.3)
HGB BLD-MCNC: 16.2 G/DL (ref 13.6–17.2)
IMM GRANULOCYTES # BLD AUTO: 0 K/UL (ref 0–0.5)
IMM GRANULOCYTES NFR BLD AUTO: 0 % (ref 0–5)
LYMPHOCYTES # BLD: 1.9 K/UL (ref 0.5–4.6)
LYMPHOCYTES NFR BLD: 28 % (ref 13–44)
MCH RBC QN AUTO: 29.6 PG (ref 26.1–32.9)
MCHC RBC AUTO-ENTMCNC: 35.3 G/DL (ref 31.4–35)
MCV RBC AUTO: 83.8 FL (ref 79.6–97.8)
MONOCYTES # BLD: 0.4 K/UL (ref 0.1–1.3)
MONOCYTES NFR BLD: 6 % (ref 4–12)
NEUTS SEG # BLD: 4.2 K/UL (ref 1.7–8.2)
NEUTS SEG NFR BLD: 63 % (ref 43–78)
NRBC # BLD: 0 K/UL (ref 0–0.2)
PLATELET # BLD AUTO: 228 K/UL (ref 150–450)
PMV BLD AUTO: 10.5 FL (ref 9.4–12.3)
POTASSIUM SERPL-SCNC: 4.7 MMOL/L (ref 3.5–5.1)
PROT SERPL-MCNC: 7.9 G/DL (ref 6.3–8.2)
RBC # BLD AUTO: 5.48 M/UL (ref 4.23–5.6)
SODIUM SERPL-SCNC: 131 MMOL/L (ref 136–145)
TROPONIN-HIGH SENSITIVITY: 6.5 PG/ML (ref 0–14)
WBC # BLD AUTO: 6.7 K/UL (ref 4.3–11.1)

## 2021-04-26 PROCEDURE — 96375 TX/PRO/DX INJ NEW DRUG ADDON: CPT

## 2021-04-26 PROCEDURE — 93005 ELECTROCARDIOGRAM TRACING: CPT | Performed by: EMERGENCY MEDICINE

## 2021-04-26 PROCEDURE — 71045 X-RAY EXAM CHEST 1 VIEW: CPT

## 2021-04-26 PROCEDURE — 96374 THER/PROPH/DIAG INJ IV PUSH: CPT

## 2021-04-26 PROCEDURE — 74011636637 HC RX REV CODE- 636/637: Performed by: EMERGENCY MEDICINE

## 2021-04-26 PROCEDURE — 84484 ASSAY OF TROPONIN QUANT: CPT

## 2021-04-26 PROCEDURE — 85025 COMPLETE CBC W/AUTO DIFF WBC: CPT

## 2021-04-26 PROCEDURE — 74011250636 HC RX REV CODE- 250/636: Performed by: EMERGENCY MEDICINE

## 2021-04-26 PROCEDURE — 80053 COMPREHEN METABOLIC PANEL: CPT

## 2021-04-26 PROCEDURE — 99285 EMERGENCY DEPT VISIT HI MDM: CPT

## 2021-04-26 RX ORDER — LORAZEPAM 2 MG/ML
0.5 INJECTION INTRAMUSCULAR
Status: COMPLETED | OUTPATIENT
Start: 2021-04-26 | End: 2021-04-26

## 2021-04-26 RX ADMIN — SODIUM CHLORIDE 1000 ML: 900 INJECTION, SOLUTION INTRAVENOUS at 23:47

## 2021-04-26 RX ADMIN — INSULIN HUMAN 10 UNITS: 100 INJECTION, SOLUTION PARENTERAL at 23:47

## 2021-04-26 RX ADMIN — LORAZEPAM 0.5 MG: 2 INJECTION INTRAMUSCULAR; INTRAVENOUS at 23:47

## 2021-04-27 VITALS
WEIGHT: 276.02 LBS | HEART RATE: 86 BPM | OXYGEN SATURATION: 96 % | RESPIRATION RATE: 18 BRPM | HEIGHT: 73 IN | BODY MASS INDEX: 36.58 KG/M2 | DIASTOLIC BLOOD PRESSURE: 91 MMHG | TEMPERATURE: 98.6 F | SYSTOLIC BLOOD PRESSURE: 150 MMHG

## 2021-04-27 LAB
ATRIAL RATE: 108 BPM
CALCULATED P AXIS, ECG09: 64 DEGREES
CALCULATED R AXIS, ECG10: 36 DEGREES
CALCULATED T AXIS, ECG11: 78 DEGREES
DIAGNOSIS, 93000: NORMAL
GLUCOSE BLD STRIP.AUTO-MCNC: 322 MG/DL (ref 65–100)
GLUCOSE BLD STRIP.AUTO-MCNC: 470 MG/DL (ref 65–100)
P-R INTERVAL, ECG05: 144 MS
Q-T INTERVAL, ECG07: 328 MS
QRS DURATION, ECG06: 88 MS
QTC CALCULATION (BEZET), ECG08: 439 MS
SERVICE CMNT-IMP: ABNORMAL
SERVICE CMNT-IMP: ABNORMAL
TROPONIN-HIGH SENSITIVITY: 5.2 PG/ML (ref 0–14)
VENTRICULAR RATE, ECG03: 108 BPM

## 2021-04-27 PROCEDURE — 82962 GLUCOSE BLOOD TEST: CPT

## 2021-04-27 PROCEDURE — 74011636637 HC RX REV CODE- 636/637: Performed by: EMERGENCY MEDICINE

## 2021-04-27 PROCEDURE — 96376 TX/PRO/DX INJ SAME DRUG ADON: CPT

## 2021-04-27 PROCEDURE — 74011250636 HC RX REV CODE- 250/636: Performed by: EMERGENCY MEDICINE

## 2021-04-27 PROCEDURE — 84484 ASSAY OF TROPONIN QUANT: CPT

## 2021-04-27 PROCEDURE — 96361 HYDRATE IV INFUSION ADD-ON: CPT

## 2021-04-27 RX ORDER — LORAZEPAM 2 MG/ML
0.5 INJECTION INTRAMUSCULAR
Status: COMPLETED | OUTPATIENT
Start: 2021-04-27 | End: 2021-04-27

## 2021-04-27 RX ADMIN — LORAZEPAM 0.5 MG: 2 INJECTION INTRAMUSCULAR; INTRAVENOUS at 01:56

## 2021-04-27 RX ADMIN — SODIUM CHLORIDE 1000 ML: 900 INJECTION, SOLUTION INTRAVENOUS at 00:34

## 2021-04-27 RX ADMIN — INSULIN HUMAN 10 UNITS: 100 INJECTION, SOLUTION PARENTERAL at 00:35

## 2021-04-27 NOTE — ED TRIAGE NOTES
Wife states that the pt began having chest pain after the neighbors continually stomp on the floor in the apartment abve them. Pt suffers from PTSD but doesn't take anything for anxiety.   Masked on arrival

## 2021-04-27 NOTE — ED PROVIDER NOTES
Patient has a history of PTSD, type 2 diabetes, and depression and hyperlipidemia complaining of chest pain which started approximately 2 hours ago. He states his upstairs neighbors have been stomping on the ceiling all day today. He has PTSD and this seems to trigger his symptoms. He states approximately an hour and a half ago \"I just snapped\". He states he was feeling very anxious and started having chest pain. He describes it as midsternal sharp nonradiating pain without any aggravating or alleviating factors. He is very anxious with his chest pain. He states he has had similar symptoms in the past with his PTSD.   He has not taken any medicine for his symptoms, has had similar symptoms many times in the past.           Past Medical History:   Diagnosis Date    Abdominal pain 01/22/2015    Abrasion of hip 07/28/2015    Bronchitis 01/07/2016    Chronic back pain     Depression     Diabetes (HCC)     Type 2 on oral agents    [facility]     Endocrine disease     Hydrocele     Hyperlipidemia     Joint pain     upper arm    Local skin infection 07/28/2015    Lumbago 11/17/2014    Lumbar degenerative disc disease 11/17/2014    Malaise and fatigue 09/03/2014    Obesity     Other forms of angina pectoris ([facility] [address] 09/03/2014    Psoriasis     PTSD (post-traumatic stress disorder)     Tachycardia 09/03/2014    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       Past Surgical History:   Procedure Laterality Date    HX APPENDECTOMY  01/2019    Appendectomy     HX HERNIA REPAIR Right 05/2019    Right inguinal hernia repair with mesh - no records- in [place]    [address]      L4-L5 fusion     HX PREMALIG/BENIGN SKIN LESION EXCISION      excision of spider bite scar    IR ANGIOPLASTY VENOUS RT  2018    angiogram of the right - [place]         Family History:   Problem Relation Age of Onset    Diabetes Mother         per medical records dated 01/14/2016    Diabetes Father     Stroke Father     Heart Attack Paternal Grandmother     Heart Attack Other     Cancer Neg Hx        Social History     Socioeconomic History    Marital status:      Spouse name: Not on file    Number of children: Not on file    Years of education: Not on file    Highest education level: Not on file   Occupational History    Not on file   Social Needs    Financial resource strain: Not on file    Food insecurity     Worry: Not on file     Inability: Not on file    Transportation needs     Medical: Not on file     Non-medical: Not on file   Tobacco Use    Smoking status: Current Every Day Smoker     Packs/day: 0.50     Last attempt to quit: 3/3/2020     Years since quittin.1    Smokeless tobacco: Never Used   Substance and Sexual Activity    Alcohol use: Yes     Comment: monthy     Drug use: Not Currently    Sexual activity: Yes     Partners: Female     Comment:    Lifestyle    Physical activity     Days per week: Not on file     Minutes per session: Not on file    Stress: Not on file   Relationships    Social connections     Talks on phone: Not on file     Gets together: Not on file     Attends Christian service: Not on file     Active member of club or organization: Not on file     Attends meetings of clubs or organizations: Not on file     Relationship status: Not on file    Intimate partner violence     Fear of current or ex partner: Not on file     Emotionally abused: Not on file     Physically abused: Not on file     Forced sexual activity: Not on file   Other Topics Concern    Not on file   Social History Narrative    gentleman who is chronically disabled because of depression, anxiety, posttraumatic stress disorder along with chronic low back pain         ALLERGIES: Amitriptyline, Dilaudid [hydromorphone], Seroquel [quetiapine], and Vicodin [hydrocodone-acetaminophen]    Review of Systems   Constitutional: Negative for chills and fever.    Gastrointestinal: Negative for nausea and vomiting. All other systems reviewed and are negative. Vitals:    04/26/21 2215 04/26/21 2259 04/26/21 2300   BP: 138/86 (!) 140/92    Pulse: (!) 115     Resp: 24     Temp: 98.8 °F (37.1 °C)     SpO2: 96%  93%   Weight: 125.2 kg (276 lb 0.3 oz)     Height: 6' 1\" (1.854 m)              Physical Exam  Vitals signs and nursing note reviewed. Constitutional:       Appearance: He is well-developed. Comments: Patient appears very anxious   HENT:      Head: Normocephalic and atraumatic. Eyes:      Conjunctiva/sclera: Conjunctivae normal.      Pupils: Pupils are equal, round, and reactive to light. Neck:      Musculoskeletal: Normal range of motion and neck supple. Cardiovascular:      Rate and Rhythm: Regular rhythm. Tachycardia present. Pulmonary:      Effort: Pulmonary effort is normal. No respiratory distress. Breath sounds: No wheezing. Abdominal:      General: Bowel sounds are normal.      Palpations: Abdomen is soft. Musculoskeletal: Normal range of motion. Skin:     General: Skin is warm and dry. Neurological:      Mental Status: He is alert and oriented to person, place, and time. MDM  Number of Diagnoses or Management Options  Anxiety state: established and worsening  Chest pain, unspecified type: new and does not require workup  Type 2 diabetes mellitus with hyperglycemia, with long-term current use of insulin (HCC)  Diagnosis management comments: 1:55 AM last blood sugar check was 322 after 2 liters normal saline and insulin 10 units x 2. Patient states his blood sugar typically runs in the 270s, states he ate right before he came and had not taken his insulin yet.        Amount and/or Complexity of Data Reviewed  Clinical lab tests: ordered and reviewed  Tests in the radiology section of CPT®: ordered and reviewed  Tests in the medicine section of CPT®: reviewed and ordered    Risk of Complications, Morbidity, and/or Mortality  Presenting problems: moderate  Diagnostic procedures: moderate  Management options: moderate    Patient Progress  Patient progress: improved         EKG    Date/Time: 4/27/2021 1:46 AM  Performed by: Kaitlin Draper MD  Authorized by: Kaitlin Draper MD     ECG reviewed by ED Physician in the absence of a cardiologist: yes    Previous ECG:     Previous ECG:  Unavailable  Interpretation:     Interpretation: normal    Rate:     ECG rate:  108    ECG rate assessment: tachycardic    Rhythm:     Rhythm: sinus tachycardia    Ectopy:     Ectopy: none    QRS:     QRS axis:  Normal    QRS intervals:  Normal  Conduction:     Conduction: normal    ST segments:     ST segments:  Normal  T waves:     T waves: normal

## 2021-04-27 NOTE — ED NOTES
I have reviewed discharge instructions with the patient. The patient verbalized understanding. Patient left ED via Discharge Method: ambulatory to Home with wife. Opportunity for questions and clarification provided. Patient given 0 scripts. To continue your aftercare when you leave the hospital, you may receive an automated call from our care team to check in on how you are doing. This is a free service and part of our promise to provide the best care and service to meet your aftercare needs.  If you have questions, or wish to unsubscribe from this service please call [phone]. Thank you for Choosing our [address] [place] [address] Emergency Department.

## 2021-08-24 ENCOUNTER — HOSPITAL ENCOUNTER (EMERGENCY)
Age: 46
Discharge: HOME | End: 2021-08-25
Attending: EMERGENCY MEDICINE
Payer: MEDICARE

## 2021-08-24 ENCOUNTER — APPOINTMENT (OUTPATIENT)
Dept: GENERAL RADIOLOGY | Age: 46
End: 2021-08-24
Attending: PHYSICIAN ASSISTANT
Payer: MEDICARE

## 2021-08-24 DIAGNOSIS — M54.6 ACUTE RIGHT-SIDED THORACIC BACK PAIN: Primary | ICD-10-CM

## 2021-08-24 PROCEDURE — 99283 EMERGENCY DEPT VISIT LOW MDM: CPT

## 2021-08-24 PROCEDURE — 71045 X-RAY EXAM CHEST 1 VIEW: CPT

## 2021-08-25 VITALS
OXYGEN SATURATION: 96 % | RESPIRATION RATE: 18 BRPM | SYSTOLIC BLOOD PRESSURE: 149 MMHG | DIASTOLIC BLOOD PRESSURE: 95 MMHG | BODY MASS INDEX: 36.94 KG/M2 | HEART RATE: 104 BPM | TEMPERATURE: 98.8 F | WEIGHT: 280 LBS

## 2021-08-25 RX ORDER — CYCLOBENZAPRINE HCL 10 MG
10 TABLET ORAL
Qty: 10 TABLET | Refills: 0 | Status: SHIPPED | OUTPATIENT
Start: 2021-08-25 | End: 2021-09-04

## 2021-08-25 NOTE — ED PROVIDER NOTES
76-year-old male who presents with chief complaint of back pain that started yesterday afternoon as he was making dinner. He has been taking the medications at home without any improvement of his symptoms. Pain is in the thoracic region right side greater than left. Radiates down his right leg.            Past Medical History:   Diagnosis Date    Abdominal pain 01/22/2015    Abrasion of hip 07/28/2015    Bronchitis 01/07/2016    Chronic back pain     Depression     Diabetes ([facility] [address]     Type 2 on oral agents    [facility]) ([facility])     Endocrine disease     Hydrocele     Hyperlipidemia     Joint pain     upper arm    Local skin infection 07/28/2015    Lumbago 11/17/2014    Lumbar degenerative disc disease 11/17/2014    Malaise and fatigue 09/03/2014    Obesity     Other forms of angina pectoris ([facility] [address] 09/03/2014    Psoriasis     PTSD (post-traumatic stress disorder)     Tachycardia 09/03/2014    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       Past Surgical History:   Procedure Laterality Date    HX APPENDECTOMY  01/2019    Appendectomy     HX HERNIA REPAIR Right 05/2019    Right inguinal hernia repair with mesh - no records- in [place]      L4-L5 fusion     HX PREMALIG/BENIGN SKIN LESION EXCISION      excision of spider bite scar    IR ANGIOPLASTY VENOUS RT  2018    angiogram of the right - [place]         Family History:   Problem Relation Age of Onset    Diabetes Mother         per medical records dated 01/14/2016    Diabetes Father     Stroke Father     Heart Attack Paternal Grandmother     Heart Attack Other     Cancer Neg Hx        Social History     Socioeconomic History    Marital status:      Spouse name: Not on file    Number of children: Not on file    Years of education: Not on file    Highest education level: Not on file   Occupational History    Not on file   Tobacco Use    Smoking status: Current Every Day Smoker     Packs/day: 0.50 Last attempt to quit: 3/3/2020     Years since quittin.4    Smokeless tobacco: Never Used   Substance and Sexual Activity    Alcohol use: Yes     Comment: monthy     Drug use: Not Currently    Sexual activity: Yes     Partners: Female     Comment:    Other Topics Concern    Not on file   Social History Narrative    gentleman who is chronically disabled because of depression, anxiety, posttraumatic stress disorder along with chronic low back pain     Social Determinants of Health     Financial Resource Strain:     Difficulty of Paying Living Expenses:    Food Insecurity:     Worried About Running Out of Food in the Last Year:     Ran Out of Food in the Last Year:    Transportation Needs:     Lack of Transportation (Medical):  Lack of Transportation (Non-Medical):    Physical Activity:     Days of Exercise per Week:     Minutes of Exercise per Session:    Stress:     Feeling of Stress :    Social Connections:     Frequency of Communication with Friends and Family:     Frequency of Social Gatherings with Friends and Family:     Attends Christianity Services:     Active Member of Clubs or Organizations:     Attends Club or Organization Meetings:     Marital Status:    Intimate Partner Violence:     Fear of Current or Ex-Partner:     Emotionally Abused:     Physically Abused:     Sexually Abused: ALLERGIES: Amitriptyline, Dilaudid [hydromorphone], Seroquel [quetiapine], and Vicodin [hydrocodone-acetaminophen]    Review of Systems   Constitutional: Negative for chills and fever. HENT: Negative for facial swelling. Respiratory: Negative for chest tightness and shortness of breath. Cardiovascular: Negative for chest pain. Gastrointestinal: Negative for abdominal pain, nausea and vomiting. Musculoskeletal: Positive for arthralgias and back pain. Negative for myalgias. Neurological: Negative for headaches. Psychiatric/Behavioral: Negative for confusion.    All other systems reviewed and are negative. Vitals:    08/24/21 2209   BP: 119/74   Pulse: (!) 105   Resp: 22   Temp: 98.8 °F (37.1 °C)   SpO2: 97%   Weight: 127 kg (280 lb)            Physical Exam  Vitals and nursing note reviewed. Constitutional:       Appearance: Normal appearance. HENT:      Head: Normocephalic and atraumatic. Mouth/Throat:      Mouth: Mucous membranes are moist.   Eyes:      Pupils: Pupils are equal, round, and reactive to light. Cardiovascular:      Rate and Rhythm: Normal rate and regular rhythm. Pulmonary:      Effort: No respiratory distress. Breath sounds: Normal breath sounds. Abdominal:      Palpations: Abdomen is soft. Tenderness: There is no abdominal tenderness. There is no guarding. Skin:     General: Skin is warm and dry. Neurological:      Mental Status: He is alert and oriented to person, place, and time. Mental status is at baseline. Psychiatric:         Mood and Affect: Mood normal.          MDM  Number of Diagnoses or Management Options  Acute right-sided thoracic back pain  Diagnosis management comments: Thoracic muscle spasm in the right side. Palpable on exam.  We will go ahead and prescribe Flexeril and instructions to use heat and rest on the affected area. Patient verbalized understanding. At time of discharge patient is resting comfortably no acute distress.        Amount and/or Complexity of Data Reviewed  Tests in the radiology section of CPT®: ordered and reviewed    Risk of Complications, Morbidity, and/or Mortality  Presenting problems: low  Diagnostic procedures: low  Management options: low    Patient Progress  Patient progress: improved         Procedures

## 2021-08-25 NOTE — ED TRIAGE NOTES
Pt states he is having back pain in the center of his back going down his right leg otc medication are not working

## 2021-08-25 NOTE — ED NOTES
I have reviewed discharge instructions with the patient. The patient verbalized understanding. Patient left ED via Discharge Method: ambulatory to Home with self. Opportunity for questions and clarification provided. Patient given 1 scripts. No driving or operating machinery with muscle relaxer         To continue your aftercare when you leave the hospital, you may receive an automated call from our care team to check in on how you are doing. This is a free service and part of our promise to provide the best care and service to meet your aftercare needs.  If you have questions, or wish to unsubscribe from this service please call [phone]. Thank you for Choosing our [facility] Emergency Department.

## 2022-03-18 PROBLEM — K40.90 INGUINAL HERNIA: Status: ACTIVE | Noted: 2020-01-10

## 2022-03-18 PROBLEM — G45.9 TIA (TRANSIENT ISCHEMIC ATTACK): Status: ACTIVE | Noted: 2019-09-10

## 2022-03-19 PROBLEM — R10.31 INGUINODYNIA, RIGHT: Status: ACTIVE | Noted: 2020-01-13
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
## 2023-12-28 ENCOUNTER — APPOINTMENT (OUTPATIENT)
Dept: CT IMAGING | Age: 48
End: 2023-12-28
Payer: MEDICARE

## 2023-12-28 ENCOUNTER — HOSPITAL ENCOUNTER (EMERGENCY)
Age: 48
Discharge: HOME | End: 2023-12-28
Payer: MEDICARE

## 2023-12-28 VITALS
BODY MASS INDEX: 36.31 KG/M2 | DIASTOLIC BLOOD PRESSURE: 78 MMHG | WEIGHT: 274 LBS | HEART RATE: 85 BPM | TEMPERATURE: 98.2 F | HEIGHT: 73 IN | OXYGEN SATURATION: 95 % | RESPIRATION RATE: 16 BRPM | SYSTOLIC BLOOD PRESSURE: 117 MMHG

## 2023-12-28 DIAGNOSIS — S76.212A STRAIN OF LEFT GROIN: Primary | ICD-10-CM

## 2023-12-28 LAB
ALBUMIN SERPL-MCNC: 3.5 G/DL (ref 3.5–5)
ALBUMIN/GLOB SERPL: 0.9 (ref 0.4–1.6)
ALP SERPL-CCNC: 117 U/L (ref 50–136)
ALT SERPL-CCNC: 64 U/L (ref 12–65)
ANION GAP SERPL CALC-SCNC: 8 MMOL/L (ref 2–11)
APPEARANCE UR: CLEAR
AST SERPL-CCNC: 50 U/L (ref 15–37)
BASOPHILS # BLD: 0 K/UL (ref 0–0.2)
BASOPHILS NFR BLD: 1 % (ref 0–2)
BILIRUB SERPL-MCNC: 0.4 MG/DL (ref 0.2–1.1)
BILIRUB UR QL: NEGATIVE
BUN SERPL-MCNC: 13 MG/DL (ref 6–23)
CALCIUM SERPL-MCNC: 9.2 MG/DL (ref 8.3–10.4)
CHLORIDE SERPL-SCNC: 102 MMOL/L (ref 103–113)
CO2 SERPL-SCNC: 26 MMOL/L (ref 21–32)
COLOR UR: ABNORMAL
CREAT SERPL-MCNC: 0.91 MG/DL (ref 0.8–1.5)
DIFFERENTIAL METHOD BLD: NORMAL
EOSINOPHIL # BLD: 0.1 K/UL (ref 0–0.8)
EOSINOPHIL NFR BLD: 2 % (ref 0.5–7.8)
ERYTHROCYTE [DISTWIDTH] IN BLOOD BY AUTOMATED COUNT: 12.8 % (ref 11.9–14.6)
GLOBULIN SER CALC-MCNC: 4.1 G/DL (ref 2.8–4.5)
GLUCOSE SERPL-MCNC: 303 MG/DL (ref 65–100)
GLUCOSE UR STRIP.AUTO-MCNC: >1000 MG/DL
HCT VFR BLD AUTO: 45.7 % (ref 41.1–50.3)
HGB BLD-MCNC: 15.7 G/DL (ref 13.6–17.2)
HGB UR QL STRIP: NEGATIVE
IMM GRANULOCYTES # BLD AUTO: 0 K/UL (ref 0–0.5)
IMM GRANULOCYTES NFR BLD AUTO: 1 % (ref 0–5)
KETONES UR QL STRIP.AUTO: ABNORMAL MG/DL
LEUKOCYTE ESTERASE UR QL STRIP.AUTO: NEGATIVE
LIPASE SERPL-CCNC: 517 U/L (ref 73–393)
LYMPHOCYTES # BLD: 2.1 K/UL (ref 0.5–4.6)
LYMPHOCYTES NFR BLD: 36 % (ref 13–44)
MCH RBC QN AUTO: 29.8 PG (ref 26.1–32.9)
MCHC RBC AUTO-ENTMCNC: 34.4 G/DL (ref 31.4–35)
MCV RBC AUTO: 86.7 FL (ref 82–102)
MONOCYTES # BLD: 0.6 K/UL (ref 0.1–1.3)
MONOCYTES NFR BLD: 10 % (ref 4–12)
NEUTS SEG # BLD: 2.9 K/UL (ref 1.7–8.2)
NEUTS SEG NFR BLD: 51 % (ref 43–78)
NITRITE UR QL STRIP.AUTO: NEGATIVE
NRBC # BLD: 0 K/UL (ref 0–0.2)
PH UR STRIP: 5 (ref 5–9)
PLATELET # BLD AUTO: 180 K/UL (ref 150–450)
PMV BLD AUTO: 10.5 FL (ref 9.4–12.3)
POTASSIUM SERPL-SCNC: 4.4 MMOL/L (ref 3.5–5.1)
PROT SERPL-MCNC: 7.6 G/DL (ref 6.3–8.2)
PROT UR STRIP-MCNC: NEGATIVE MG/DL
RBC # BLD AUTO: 5.27 M/UL (ref 4.23–5.6)
SODIUM SERPL-SCNC: 136 MMOL/L (ref 136–146)
SP GR UR REFRACTOMETRY: 1.03 (ref 1–1.02)
UROBILINOGEN UR QL STRIP.AUTO: 0.2 EU/DL (ref 0.2–1)
WBC # BLD AUTO: 5.7 K/UL (ref 4.3–11.1)

## 2023-12-28 PROCEDURE — 99285 EMERGENCY DEPT VISIT HI MDM: CPT

## 2023-12-28 PROCEDURE — 6370000000 HC RX 637 (ALT 250 FOR IP)

## 2023-12-28 PROCEDURE — 6360000004 HC RX CONTRAST MEDICATION

## 2023-12-28 PROCEDURE — 96374 THER/PROPH/DIAG INJ IV PUSH: CPT

## 2023-12-28 PROCEDURE — 6360000002 HC RX W HCPCS

## 2023-12-28 PROCEDURE — 80053 COMPREHEN METABOLIC PANEL: CPT

## 2023-12-28 PROCEDURE — 83690 ASSAY OF LIPASE: CPT

## 2023-12-28 PROCEDURE — 74177 CT ABD & PELVIS W/CONTRAST: CPT

## 2023-12-28 PROCEDURE — 85025 COMPLETE CBC W/AUTO DIFF WBC: CPT

## 2023-12-28 PROCEDURE — 81003 URINALYSIS AUTO W/O SCOPE: CPT

## 2023-12-28 RX ORDER — KETOROLAC TROMETHAMINE 15 MG/ML
15 INJECTION, SOLUTION INTRAMUSCULAR; INTRAVENOUS ONCE
Status: COMPLETED | OUTPATIENT
Start: 2023-12-28 | End: 2023-12-28

## 2023-12-28 RX ORDER — ACETAMINOPHEN 500 MG
1000 TABLET ORAL
Status: COMPLETED | OUTPATIENT
Start: 2023-12-28 | End: 2023-12-28

## 2023-12-28 RX ORDER — METHOCARBAMOL 750 MG/1
750 TABLET, FILM COATED ORAL 4 TIMES DAILY
Qty: 20 TABLET | Refills: 0 | Status: SHIPPED | OUTPATIENT
Start: 2023-12-28 | End: 2024-01-02

## 2023-12-28 RX ORDER — LIDOCAINE 50 MG/G
1 PATCH TOPICAL DAILY
Qty: 10 PATCH | Refills: 0 | Status: SHIPPED | OUTPATIENT
Start: 2023-12-28 | End: 2024-01-07

## 2023-12-28 RX ORDER — IBUPROFEN 600 MG/1
600 TABLET ORAL 3 TIMES DAILY PRN
Qty: 30 TABLET | Refills: 0 | Status: SHIPPED | OUTPATIENT
Start: 2023-12-28

## 2023-12-28 RX ADMIN — KETOROLAC TROMETHAMINE 15 MG: 15 INJECTION, SOLUTION INTRAMUSCULAR; INTRAVENOUS at 19:04

## 2023-12-28 RX ADMIN — ACETAMINOPHEN 1000 MG: 500 TABLET, FILM COATED ORAL at 20:41

## 2023-12-28 RX ADMIN — IOPAMIDOL 100 ML: 755 INJECTION, SOLUTION INTRAVENOUS at 19:42

## 2023-12-28 NOTE — ED TRIAGE NOTES
Patient ambulatory to triage. Patient c\o L sided groin and testicular pain. States this began yesterday.

## 2023-12-28 NOTE — ED PROVIDER NOTES
lymphadenopathy. The aorta, visceral vessels and renal arteries demonstrate normal caliber and  patency. The lower thoracic and lumbar vertebrae are in normal alignment. Impression    Hepatomegaly and hepatic steatosis. Fat-containing right inguinal hernia. There is thickening of the rectal wall. If clinically indicated follow-up  colonoscopy can be done to exclude tumor. All CT scans at this facility are performed using low  dose modulation  techniques as appropriate to perform exam including the following: automated  exposure control; use of iterative reconstruction technique; adjustment of the  mA and/or kV according to patient size (this includes techniques or standardized  protocols for targeted exams where dose is matched to indication/reason for  exam).      CBC with Auto Differential   Result Value Ref Range    WBC 5.7 4.3 - 11.1 K/uL    RBC 5.27 4.23 - 5.6 M/uL    Hemoglobin 15.7 13.6 - 17.2 g/dL    Hematocrit 45.7 41.1 - 50.3 %    MCV 86.7 82.0 - 102.0 FL    MCH 29.8 26.1 - 32.9 PG    MCHC 34.4 31.4 - 35.0 g/dL    RDW 12.8 11.9 - 14.6 %    Platelets 725 583 - 477 K/uL    MPV 10.5 9.4 - 12.3 FL    nRBC 0.00 0.0 - 0.2 K/uL    Differential Type AUTOMATED      Neutrophils % 51 43 - 78 %    Lymphocytes % 36 13 - 44 %    Monocytes % 10 4.0 - 12.0 %    Eosinophils % 2 0.5 - 7.8 %    Basophils % 1 0.0 - 2.0 %    Immature Granulocytes 1 0.0 - 5.0 %    Neutrophils Absolute 2.9 1.7 - 8.2 K/UL    Lymphocytes Absolute 2.1 0.5 - 4.6 K/UL    Monocytes Absolute 0.6 0.1 - 1.3 K/UL    Eosinophils Absolute 0.1 0.0 - 0.8 K/UL    Basophils Absolute 0.0 0.0 - 0.2 K/UL    Absolute Immature Granulocyte 0.0 0.0 - 0.5 K/UL   CMP   Result Value Ref Range    Sodium 136 136 - 146 mmol/L    Potassium 4.4 3.5 - 5.1 mmol/L    Chloride 102 (L) 103 - 113 mmol/L    CO2 26 21 - 32 mmol/L    Anion Gap 8 2 - 11 mmol/L    Glucose 303 (H) 65 - 100 mg/dL    BUN 13 6 - 23 MG/DL    Creatinine 0.91 0.8 - 1.5 MG/DL    Est,

## 2023-12-29 NOTE — DISCHARGE INSTRUCTIONS
Labs and CT imaging are reassuring. Pain thought to be secondary to a groin strain. Prescription for anti-inflammatory medication and muscle relaxer provided as needed. Please note the muscle relaxer may make you feel drowsy so do not drive, go to work or operate machinery if you decide to take this medication. Resources provided for primary care follow-up. Please return with any worsening symptoms or concerns.

## 2024-02-10 ENCOUNTER — APPOINTMENT (OUTPATIENT)
Dept: GENERAL RADIOLOGY | Age: 49
End: 2024-02-10
Payer: MEDICARE

## 2024-02-10 ENCOUNTER — HOSPITAL ENCOUNTER (EMERGENCY)
Age: 49
Discharge: HOME | End: 2024-02-10
Payer: MEDICARE

## 2024-02-10 VITALS
WEIGHT: 275 LBS | RESPIRATION RATE: 18 BRPM | BODY MASS INDEX: 36.45 KG/M2 | HEIGHT: 73 IN | SYSTOLIC BLOOD PRESSURE: 125 MMHG | TEMPERATURE: 97.9 F | HEART RATE: 102 BPM | OXYGEN SATURATION: 96 % | DIASTOLIC BLOOD PRESSURE: 86 MMHG

## 2024-02-10 DIAGNOSIS — M54.31 BILATERAL SCIATICA: ICD-10-CM

## 2024-02-10 DIAGNOSIS — M54.50 ACUTE EXACERBATION OF CHRONIC LOW BACK PAIN: Primary | ICD-10-CM

## 2024-02-10 DIAGNOSIS — G89.29 ACUTE EXACERBATION OF CHRONIC LOW BACK PAIN: Primary | ICD-10-CM

## 2024-02-10 DIAGNOSIS — M54.32 BILATERAL SCIATICA: ICD-10-CM

## 2024-02-10 PROCEDURE — 96372 THER/PROPH/DIAG INJ SC/IM: CPT

## 2024-02-10 PROCEDURE — 6370000000 HC RX 637 (ALT 250 FOR IP): Performed by: PHYSICIAN ASSISTANT

## 2024-02-10 PROCEDURE — 99284 EMERGENCY DEPT VISIT MOD MDM: CPT

## 2024-02-10 PROCEDURE — 72100 X-RAY EXAM L-S SPINE 2/3 VWS: CPT

## 2024-02-10 PROCEDURE — 6360000002 HC RX W HCPCS: Performed by: PHYSICIAN ASSISTANT

## 2024-02-10 RX ORDER — TRAZODONE HYDROCHLORIDE 150 MG/1
150 TABLET ORAL
COMMUNITY

## 2024-02-10 RX ORDER — METHOCARBAMOL 750 MG/1
750 TABLET, FILM COATED ORAL 4 TIMES DAILY
Qty: 40 TABLET | Refills: 0 | Status: SHIPPED | OUTPATIENT
Start: 2024-02-10 | End: 2024-02-20

## 2024-02-10 RX ORDER — METHOCARBAMOL 750 MG/1
750 TABLET, FILM COATED ORAL
Status: COMPLETED | OUTPATIENT
Start: 2024-02-10 | End: 2024-02-10

## 2024-02-10 RX ORDER — MELOXICAM 15 MG/1
15 TABLET ORAL DAILY
Qty: 7 TABLET | Refills: 0 | Status: SHIPPED | OUTPATIENT
Start: 2024-02-10 | End: 2024-02-17

## 2024-02-10 RX ORDER — ASPIRIN 81 MG/1
81 TABLET ORAL DAILY
COMMUNITY
Start: 2022-08-04

## 2024-02-10 RX ORDER — KETOROLAC TROMETHAMINE 30 MG/ML
30 INJECTION, SOLUTION INTRAMUSCULAR; INTRAVENOUS
Status: COMPLETED | OUTPATIENT
Start: 2024-02-10 | End: 2024-02-10

## 2024-02-10 RX ADMIN — METHOCARBAMOL 750 MG: 750 TABLET ORAL at 12:03

## 2024-02-10 RX ADMIN — KETOROLAC TROMETHAMINE 30 MG: 30 INJECTION, SOLUTION INTRAMUSCULAR; INTRAVENOUS at 12:03

## 2024-02-10 ASSESSMENT — PAIN SCALES - GENERAL
PAINLEVEL_OUTOF10: 7
PAINLEVEL_OUTOF10: 7

## 2024-02-10 ASSESSMENT — PAIN DESCRIPTION - LOCATION
LOCATION: BACK
LOCATION: BACK

## 2024-02-10 ASSESSMENT — PAIN - FUNCTIONAL ASSESSMENT: PAIN_FUNCTIONAL_ASSESSMENT: 0-10

## 2024-02-10 NOTE — ED NOTES
I have reviewed discharge instructions with the patient.  The patient verbalized understanding.    Patient left ED via Discharge Method: ambulatory to Home with self.    Opportunity for questions and clarification provided.       Patient given 2 scripts.         To continue your aftercare when you leave the hospital, you may receive an automated call from our care team to check in on how you are doing.  This is a free service and part of our promise to provide the best care and service to meet your aftercare needs.” If you have questions, or wish to unsubscribe from this service please call [phone].  Thank you for Choosing our [facility] Emergency Department.

## 2024-02-10 NOTE — DISCHARGE INSTRUCTIONS
Follow up with spine Surgery as discussed.     Use medications as prescribed to help with pain control.  I recommend gentle stretching exercises at home.  Return to the ER for any worsening of symptoms

## 2024-02-10 NOTE — ED PROVIDER NOTES
Emergency Department Provider Note       PCP: No primary care provider on file.   Age: 48 y.o.   Sex: male     DISPOSITION Decision To Discharge 02/10/2024 01:05:28 PM       ICD-10-CM    1. Acute exacerbation of chronic low back pain  M54.50 [facility] (Spine Surgery)    G89.29       2. Bilateral sciatica  M54.31 [facility] (Spine Surgery)    M54.32           Medical Decision Making     Complexity of Problems Addressed:  Complexity of Problem: 1 acute, uncomplicated illness or injury.    Data Reviewed and Analyzed:  I independently ordered and reviewed each unique test.  I reviewed external records: ED visit note from an outside group.   I interpreted the X-rays.  I reviewed x-ray imaging and radiology report.  Spinal fusion.  Degenerative disc disease    Discussion of management or test interpretation.  48-year-old male presenting to the emergency department today for evaluation of back pain that radiates down both legs.  Patient is able to ambulate.  He has not had any bowel or bladder incontinence.  He has equal strength and sensation in bilateral lower extremities.  He has no signs of cauda equina.  His x-ray shows chronic degenerative changes with spinal fusion.  He was treated here symptomatically and reported improvement in his symptoms.  I will discharge home with muscle relaxer, I have placed a referral for him to follow-up with spine surgery.  Return to the ER for any worsening symptoms.  Patient is in agreement with this plan.  ED Course as of 02/10/24 1635   Sat Feb 10, 2024   1305 Patient feels better, wants to go home.  Will refer to orthospine for follow-up. [KE]      ED Course User Index  [KE] Wendy Tobar PA       Risk of Complications and/or Morbidity of Patient Management:  Prescription drug management performed and Shared medical decision making was utilized in creating the patients health plan today.      History      48-year-old  History:   Procedure Laterality Date    APPENDECTOMY  01/2019    Appendectomy     HERNIA REPAIR Right 05/2019    Right inguinal hernia repair with mesh - no records- in [place]    IR TRANSLUMINAL BALLOON PTA 1ST VEIN  2018    angiogram of the right - [place]    LUMBAR FUSION      L4-L5 fusion     PRE-MALIGNANT / BENIGN SKIN LESION EXCISION      excision of spider bite scar        Results for orders placed or performed during the hospital encounter of 02/10/24   XR LUMBAR SPINE (2-3 VIEWS)    Narrative    EXAM: XR LUMBAR SPINE (2-3 VIEWS)  INDICATION: low back pain, radiates down both legs. hx of spinal fusion  COMPARISON: None.    TECHNIQUE: Lumbar Spine AP/LAT imaging was obtained.     FINDINGS:  Alignment is within normal limits. The patient is status post posterior fusion  of L4-S1. Hardware appears well-positioned and intact with bilateral pedicle  screws and interconnecting fusion rods present. Intervertebral disc cages at  L4-L5 and L5-S1. Vertebral body heights are preserved. No radiographic evidence  of fracture. Moderate disc base narrowing at L3-L4. Bone mineralization and soft  tissues are within normal limits.        Impression    No acute findings. Remote posterior fusion of L4-S1.          XR LUMBAR SPINE (2-3 VIEWS)   Final Result   No acute findings. Remote posterior fusion of L4-S1.               Voice dictation software was used during the making of this note.  This software is not perfect and grammatical and other typographical errors may be present.  This note has not been completely proofread for errors.       Wendy Tobar PA  02/10/24 2873

## 2024-02-10 NOTE — ED TRIAGE NOTES
Pt CO lower back pain for several days. Pt had spinal fusion in 2007. Pt states he was driving and his back pain increased with tingling in both feet. Pt states \"my left leg gave out this morning\". Pt ambulatory to triage.

## 2024-02-19 ENCOUNTER — OFFICE VISIT (OUTPATIENT)
Dept: ORTHOPEDIC SURGERY | Age: 49
End: 2024-02-19
Payer: MEDICARE

## 2024-02-19 VITALS — WEIGHT: 275 LBS | HEIGHT: 73 IN | BODY MASS INDEX: 36.45 KG/M2

## 2024-02-19 DIAGNOSIS — Z98.1 STATUS POST LUMBAR SPINAL FUSION: ICD-10-CM

## 2024-02-19 DIAGNOSIS — M48.062 SPINAL STENOSIS OF LUMBAR REGION WITH NEUROGENIC CLAUDICATION: Primary | ICD-10-CM

## 2024-02-19 PROCEDURE — 99204 OFFICE O/P NEW MOD 45 MIN: CPT | Performed by: PHYSICIAN ASSISTANT

## 2024-02-19 PROCEDURE — 4004F PT TOBACCO SCREEN RCVD TLK: CPT | Performed by: PHYSICIAN ASSISTANT

## 2024-02-19 PROCEDURE — G8427 DOCREV CUR MEDS BY ELIG CLIN: HCPCS | Performed by: PHYSICIAN ASSISTANT

## 2024-02-19 PROCEDURE — G8417 CALC BMI ABV UP PARAM F/U: HCPCS | Performed by: PHYSICIAN ASSISTANT

## 2024-02-19 PROCEDURE — G8484 FLU IMMUNIZE NO ADMIN: HCPCS | Performed by: PHYSICIAN ASSISTANT

## 2024-02-19 RX ORDER — TIZANIDINE 2 MG/1
2 TABLET ORAL 3 TIMES DAILY PRN
Qty: 90 TABLET | Refills: 1 | Status: SHIPPED | OUTPATIENT
Start: 2024-02-19

## 2024-02-19 NOTE — PROGRESS NOTES
his A1c is under better control I do not think I would recommend lumbar steroid injection therapy.  I recommend at this point trying to treat his symptoms conservatively with physical therapy and we will try a muscle relaxer to try to help his back pain.  If he fails to improve with conservative management we may consider lumbar injection therapy if his diabetes gets under better control versus referral for spine surgery decompression.        4--this is a chronic illness/condition with exacerbation

## 2024-02-29 ENCOUNTER — APPOINTMENT (OUTPATIENT)
Dept: GENERAL RADIOLOGY | Age: 49
End: 2024-02-29
Payer: MEDICARE

## 2024-02-29 ENCOUNTER — HOSPITAL ENCOUNTER (EMERGENCY)
Age: 49
Discharge: HOME | End: 2024-03-01
Attending: EMERGENCY MEDICINE
Payer: MEDICARE

## 2024-02-29 DIAGNOSIS — R07.9 CHEST PAIN, UNSPECIFIED TYPE: Primary | ICD-10-CM

## 2024-02-29 LAB
ANION GAP SERPL CALC-SCNC: 6 MMOL/L (ref 2–11)
BASOPHILS # BLD: 0.1 K/UL (ref 0–0.2)
BASOPHILS NFR BLD: 1 % (ref 0–2)
BUN SERPL-MCNC: 11 MG/DL (ref 6–23)
CALCIUM SERPL-MCNC: 9.1 MG/DL (ref 8.3–10.4)
CHLORIDE SERPL-SCNC: 100 MMOL/L (ref 103–113)
CO2 SERPL-SCNC: 28 MMOL/L (ref 21–32)
CREAT SERPL-MCNC: 1.01 MG/DL (ref 0.8–1.5)
D DIMER PPP FEU-MCNC: <0.27 UG/ML(FEU)
DIFFERENTIAL METHOD BLD: NORMAL
EOSINOPHIL # BLD: 0.2 K/UL (ref 0–0.8)
EOSINOPHIL NFR BLD: 2 % (ref 0.5–7.8)
ERYTHROCYTE [DISTWIDTH] IN BLOOD BY AUTOMATED COUNT: 12.9 % (ref 11.9–14.6)
GLUCOSE SERPL-MCNC: 357 MG/DL (ref 65–100)
HCT VFR BLD AUTO: 47.2 % (ref 41.1–50.3)
HGB BLD-MCNC: 16.3 G/DL (ref 13.6–17.2)
IMM GRANULOCYTES # BLD AUTO: 0 K/UL (ref 0–0.5)
IMM GRANULOCYTES NFR BLD AUTO: 0 % (ref 0–5)
LYMPHOCYTES # BLD: 2.7 K/UL (ref 0.5–4.6)
LYMPHOCYTES NFR BLD: 30 % (ref 13–44)
MCH RBC QN AUTO: 30 PG (ref 26.1–32.9)
MCHC RBC AUTO-ENTMCNC: 34.5 G/DL (ref 31.4–35)
MCV RBC AUTO: 86.8 FL (ref 82–102)
MONOCYTES # BLD: 0.5 K/UL (ref 0.1–1.3)
MONOCYTES NFR BLD: 6 % (ref 4–12)
NEUTS SEG # BLD: 5.6 K/UL (ref 1.7–8.2)
NEUTS SEG NFR BLD: 62 % (ref 43–78)
NRBC # BLD: 0 K/UL (ref 0–0.2)
PLATELET # BLD AUTO: 225 K/UL (ref 150–450)
PMV BLD AUTO: 10.5 FL (ref 9.4–12.3)
POTASSIUM SERPL-SCNC: 4.8 MMOL/L (ref 3.5–5.1)
RBC # BLD AUTO: 5.44 M/UL (ref 4.23–5.6)
SODIUM SERPL-SCNC: 134 MMOL/L (ref 136–146)
TROPONIN I SERPL HS-MCNC: 5.6 PG/ML (ref 0–14)
TROPONIN I SERPL HS-MCNC: 6 PG/ML (ref 0–14)
WBC # BLD AUTO: 9.1 K/UL (ref 4.3–11.1)

## 2024-02-29 PROCEDURE — 96376 TX/PRO/DX INJ SAME DRUG ADON: CPT

## 2024-02-29 PROCEDURE — 6360000002 HC RX W HCPCS: Performed by: EMERGENCY MEDICINE

## 2024-02-29 PROCEDURE — 80048 BASIC METABOLIC PNL TOTAL CA: CPT

## 2024-02-29 PROCEDURE — 85379 FIBRIN DEGRADATION QUANT: CPT

## 2024-02-29 PROCEDURE — 85520 HEPARIN ASSAY: CPT

## 2024-02-29 PROCEDURE — 93005 ELECTROCARDIOGRAM TRACING: CPT | Performed by: EMERGENCY MEDICINE

## 2024-02-29 PROCEDURE — 85610 PROTHROMBIN TIME: CPT

## 2024-02-29 PROCEDURE — 96375 TX/PRO/DX INJ NEW DRUG ADDON: CPT

## 2024-02-29 PROCEDURE — 71046 X-RAY EXAM CHEST 2 VIEWS: CPT

## 2024-02-29 PROCEDURE — 84484 ASSAY OF TROPONIN QUANT: CPT

## 2024-02-29 PROCEDURE — 96365 THER/PROPH/DIAG IV INF INIT: CPT

## 2024-02-29 PROCEDURE — 6370000000 HC RX 637 (ALT 250 FOR IP): Performed by: EMERGENCY MEDICINE

## 2024-02-29 PROCEDURE — 85025 COMPLETE CBC W/AUTO DIFF WBC: CPT

## 2024-02-29 PROCEDURE — 85730 THROMBOPLASTIN TIME PARTIAL: CPT

## 2024-02-29 PROCEDURE — 99285 EMERGENCY DEPT VISIT HI MDM: CPT

## 2024-02-29 RX ORDER — HEPARIN SODIUM 1000 [USP'U]/ML
2000 INJECTION, SOLUTION INTRAVENOUS; SUBCUTANEOUS PRN
Status: DISCONTINUED | OUTPATIENT
Start: 2024-02-29 | End: 2024-03-01 | Stop reason: HOSPADM

## 2024-02-29 RX ORDER — HEPARIN SODIUM 1000 [USP'U]/ML
4000 INJECTION, SOLUTION INTRAVENOUS; SUBCUTANEOUS PRN
Status: DISCONTINUED | OUTPATIENT
Start: 2024-02-29 | End: 2024-03-01 | Stop reason: HOSPADM

## 2024-02-29 RX ORDER — ONDANSETRON 2 MG/ML
4 INJECTION INTRAMUSCULAR; INTRAVENOUS ONCE
Status: COMPLETED | OUTPATIENT
Start: 2024-02-29 | End: 2024-02-29

## 2024-02-29 RX ORDER — HEPARIN SODIUM 1000 [USP'U]/ML
4000 INJECTION, SOLUTION INTRAVENOUS; SUBCUTANEOUS ONCE
Status: COMPLETED | OUTPATIENT
Start: 2024-02-29 | End: 2024-02-29

## 2024-02-29 RX ORDER — NITROGLYCERIN 0.4 MG/1
0.4 TABLET SUBLINGUAL EVERY 5 MIN PRN
Status: DISCONTINUED | OUTPATIENT
Start: 2024-02-29 | End: 2024-03-01 | Stop reason: HOSPADM

## 2024-02-29 RX ORDER — HEPARIN SODIUM 10000 [USP'U]/100ML
5-30 INJECTION, SOLUTION INTRAVENOUS CONTINUOUS
Status: DISCONTINUED | OUTPATIENT
Start: 2024-02-29 | End: 2024-03-01 | Stop reason: HOSPADM

## 2024-02-29 RX ORDER — ASPIRIN 81 MG/1
324 TABLET, CHEWABLE ORAL ONCE
Status: COMPLETED | OUTPATIENT
Start: 2024-02-29 | End: 2024-02-29

## 2024-02-29 RX ADMIN — NITROGLYCERIN 1 INCH: 20 OINTMENT TOPICAL at 23:00

## 2024-02-29 RX ADMIN — ONDANSETRON 4 MG: 2 INJECTION INTRAMUSCULAR; INTRAVENOUS at 22:25

## 2024-02-29 RX ADMIN — NITROGLYCERIN 0.4 MG: 0.4 TABLET, ORALLY DISINTEGRATING SUBLINGUAL at 20:22

## 2024-02-29 RX ADMIN — ASPIRIN 81 MG 324 MG: 81 TABLET ORAL at 20:25

## 2024-02-29 RX ADMIN — HEPARIN SODIUM 4000 UNITS: 1000 INJECTION INTRAVENOUS; SUBCUTANEOUS at 23:03

## 2024-02-29 RX ADMIN — ONDANSETRON 4 MG: 2 INJECTION INTRAMUSCULAR; INTRAVENOUS at 20:23

## 2024-02-29 RX ADMIN — HEPARIN SODIUM 8 UNITS/KG/HR: 10000 INJECTION, SOLUTION INTRAVENOUS at 23:05

## 2024-02-29 ASSESSMENT — PAIN DESCRIPTION - LOCATION
LOCATION: CHEST

## 2024-02-29 ASSESSMENT — PAIN SCALES - GENERAL
PAINLEVEL_OUTOF10: 2
PAINLEVEL_OUTOF10: 8
PAINLEVEL_OUTOF10: 5
PAINLEVEL_OUTOF10: 5

## 2024-02-29 ASSESSMENT — LIFESTYLE VARIABLES: HOW OFTEN DO YOU HAVE A DRINK CONTAINING ALCOHOL: NEVER

## 2024-02-29 ASSESSMENT — ENCOUNTER SYMPTOMS
COUGH: 0
SHORTNESS OF BREATH: 1
NAUSEA: 1
BACK PAIN: 0
VOMITING: 1
SORE THROAT: 0
ABDOMINAL PAIN: 0

## 2024-03-01 ENCOUNTER — TELEPHONE (OUTPATIENT)
Age: 49
End: 2024-03-01

## 2024-03-01 ENCOUNTER — HOSPITAL ENCOUNTER (OUTPATIENT)
Age: 49
Setting detail: OBSERVATION
LOS: 1 days | Discharge: HOME | End: 2024-03-01
Attending: INTERNAL MEDICINE | Admitting: INTERNAL MEDICINE
Payer: MEDICARE

## 2024-03-01 ENCOUNTER — APPOINTMENT (OUTPATIENT)
Dept: NON INVASIVE DIAGNOSTICS | Age: 49
End: 2024-03-01
Attending: INTERNAL MEDICINE
Payer: MEDICARE

## 2024-03-01 VITALS
SYSTOLIC BLOOD PRESSURE: 114 MMHG | TEMPERATURE: 97.9 F | HEART RATE: 89 BPM | OXYGEN SATURATION: 95 % | HEIGHT: 73 IN | WEIGHT: 258.6 LBS | RESPIRATION RATE: 23 BRPM | BODY MASS INDEX: 34.27 KG/M2 | DIASTOLIC BLOOD PRESSURE: 77 MMHG

## 2024-03-01 VITALS
RESPIRATION RATE: 22 BRPM | OXYGEN SATURATION: 94 % | TEMPERATURE: 98.5 F | DIASTOLIC BLOOD PRESSURE: 75 MMHG | HEIGHT: 73 IN | HEART RATE: 84 BPM | BODY MASS INDEX: 36.45 KG/M2 | SYSTOLIC BLOOD PRESSURE: 127 MMHG | WEIGHT: 275 LBS

## 2024-03-01 DIAGNOSIS — R07.9 CHEST PAIN: ICD-10-CM

## 2024-03-01 DIAGNOSIS — R07.2 PRECORDIAL PAIN: Primary | ICD-10-CM

## 2024-03-01 PROBLEM — Z91.148 HISTORY OF MEDICATION NONCOMPLIANCE: Status: ACTIVE | Noted: 2024-03-01

## 2024-03-01 PROBLEM — E78.5 HLD (HYPERLIPIDEMIA): Status: ACTIVE | Noted: 2024-03-01

## 2024-03-01 PROBLEM — E11.9 DM2 (DIABETES MELLITUS, TYPE 2) (HCC): Status: ACTIVE | Noted: 2024-03-01

## 2024-03-01 PROBLEM — I25.10 CAD (CORONARY ARTERY DISEASE): Status: ACTIVE | Noted: 2024-03-01

## 2024-03-01 LAB
ANION GAP SERPL CALC-SCNC: 7 MMOL/L (ref 2–11)
APTT PPP: 30.3 SEC (ref 23.3–37.4)
BUN SERPL-MCNC: 9 MG/DL (ref 6–23)
CALCIUM SERPL-MCNC: 9.1 MG/DL (ref 8.3–10.4)
CHLORIDE SERPL-SCNC: 104 MMOL/L (ref 103–113)
CHOLEST SERPL-MCNC: 169 MG/DL
CO2 SERPL-SCNC: 27 MMOL/L (ref 21–32)
CREAT SERPL-MCNC: 0.9 MG/DL (ref 0.8–1.5)
ECHO AO ASC DIAM: 3.1 CM
ECHO AO ASCENDING AORTA INDEX: 1.29 CM/M2
ECHO AO ROOT DIAM: 3.4 CM
ECHO AO ROOT INDEX: 1.42 CM/M2
ECHO AV AREA PEAK VELOCITY: 2.7 CM2
ECHO AV AREA VTI: 2.6 CM2
ECHO AV AREA/BSA PEAK VELOCITY: 1.1 CM2/M2
ECHO AV AREA/BSA VTI: 1.1 CM2/M2
ECHO AV MEAN GRADIENT: 3 MMHG
ECHO AV MEAN VELOCITY: 0.9 M/S
ECHO AV PEAK GRADIENT: 6 MMHG
ECHO AV PEAK VELOCITY: 1.2 M/S
ECHO AV VELOCITY RATIO: 0.75
ECHO AV VTI: 22.9 CM
ECHO BSA: 2.46 M2
ECHO BSA: 2.46 M2
ECHO EST RA PRESSURE: 5 MMHG
ECHO LA AREA 2C: 15 CM2
ECHO LA AREA 4C: 11.9 CM2
ECHO LA DIAMETER INDEX: 1.33 CM/M2
ECHO LA DIAMETER: 3.2 CM
ECHO LA MAJOR AXIS: 4.5 CM
ECHO LA MINOR AXIS: 4.4 CM
ECHO LA TO AORTIC ROOT RATIO: 0.94
ECHO LA VOL BP: 32 ML (ref 18–58)
ECHO LA VOL MOD A2C: 43 ML (ref 18–58)
ECHO LA VOL MOD A4C: 23 ML (ref 18–58)
ECHO LA VOL/BSA BIPLANE: 13 ML/M2 (ref 16–34)
ECHO LA VOLUME INDEX MOD A2C: 18 ML/M2 (ref 16–34)
ECHO LA VOLUME INDEX MOD A4C: 10 ML/M2 (ref 16–34)
ECHO LV E' LATERAL VELOCITY: 7 CM/S
ECHO LV E' SEPTAL VELOCITY: 7 CM/S
ECHO LV EDV A2C: 129 ML
ECHO LV EDV A4C: 137 ML
ECHO LV EDV INDEX A4C: 57 ML/M2
ECHO LV EDV NDEX A2C: 54 ML/M2
ECHO LV EJECTION FRACTION A2C: 57 %
ECHO LV EJECTION FRACTION A4C: 56 %
ECHO LV EJECTION FRACTION BIPLANE: 57 % (ref 55–100)
ECHO LV ESV A2C: 55 ML
ECHO LV ESV A4C: 61 ML
ECHO LV ESV INDEX A2C: 23 ML/M2
ECHO LV ESV INDEX A4C: 25 ML/M2
ECHO LV FRACTIONAL SHORTENING: 40 % (ref 28–44)
ECHO LV INTERNAL DIMENSION DIASTOLE INDEX: 1.88 CM/M2
ECHO LV INTERNAL DIMENSION DIASTOLIC: 4.5 CM (ref 4.2–5.9)
ECHO LV INTERNAL DIMENSION SYSTOLIC INDEX: 1.13 CM/M2
ECHO LV INTERNAL DIMENSION SYSTOLIC: 2.7 CM
ECHO LV IVSD: 1.1 CM (ref 0.6–1)
ECHO LV MASS 2D: 186.4 G (ref 88–224)
ECHO LV MASS INDEX 2D: 77.7 G/M2 (ref 49–115)
ECHO LV POSTERIOR WALL DIASTOLIC: 1.2 CM (ref 0.6–1)
ECHO LV RELATIVE WALL THICKNESS RATIO: 0.53
ECHO LVOT AREA: 3.5 CM2
ECHO LVOT AV VTI INDEX: 0.74
ECHO LVOT DIAM: 2.1 CM
ECHO LVOT MEAN GRADIENT: 2 MMHG
ECHO LVOT PEAK GRADIENT: 4 MMHG
ECHO LVOT PEAK VELOCITY: 0.9 M/S
ECHO LVOT STROKE VOLUME INDEX: 24.4 ML/M2
ECHO LVOT SV: 58.5 ML
ECHO LVOT VTI: 16.9 CM
ECHO MV A VELOCITY: 0.77 M/S
ECHO MV E DECELERATION TIME (DT): 269 MS
ECHO MV E VELOCITY: 0.6 M/S
ECHO MV E/A RATIO: 0.78
ECHO MV E/E' LATERAL: 8.57
ECHO MV E/E' RATIO (AVERAGED): 8.57
ECHO PV ACCELERATION TIME (AT): 129 MS
ECHO PV MAX VELOCITY: 1.4 M/S
ECHO PV PEAK GRADIENT: 8 MMHG
ECHO RV BASAL DIMENSION: 3.7 CM
ECHO RV FREE WALL PEAK S': 13 CM/S
ECHO RV INTERNAL DIMENSION: 3.3 CM
ECHO RV TAPSE: 2 CM (ref 1.7–?)
EKG ATRIAL RATE: 97 BPM
EKG DIAGNOSIS: NORMAL
EKG P AXIS: 50 DEGREES
EKG P-R INTERVAL: 154 MS
EKG Q-T INTERVAL: 328 MS
EKG QRS DURATION: 95 MS
EKG QTC CALCULATION (BAZETT): 419 MS
EKG R AXIS: 33 DEGREES
EKG T AXIS: 75 DEGREES
EKG VENTRICULAR RATE: 98 BPM
ERYTHROCYTE [DISTWIDTH] IN BLOOD BY AUTOMATED COUNT: 12.9 % (ref 11.9–14.6)
EST. AVERAGE GLUCOSE BLD GHB EST-MCNC: 255 MG/DL
GLUCOSE BLD STRIP.AUTO-MCNC: 185 MG/DL (ref 65–100)
GLUCOSE BLD STRIP.AUTO-MCNC: 232 MG/DL (ref 65–100)
GLUCOSE SERPL-MCNC: 212 MG/DL (ref 65–100)
HBA1C MFR BLD: 10.5 % (ref 4.8–5.6)
HCT VFR BLD AUTO: 43.8 % (ref 41.1–50.3)
HDLC SERPL-MCNC: 31 MG/DL (ref 40–60)
HDLC SERPL: 5.5
HGB BLD-MCNC: 15 G/DL (ref 13.6–17.2)
INR PPP: 0.9
LDLC SERPL CALC-MCNC: ABNORMAL MG/DL
LDLC SERPL DIRECT ASSAY-MCNC: 93 MG/DL
MCH RBC QN AUTO: 29.8 PG (ref 26.1–32.9)
MCHC RBC AUTO-ENTMCNC: 34.2 G/DL (ref 31.4–35)
MCV RBC AUTO: 87.1 FL (ref 82–102)
NRBC # BLD: 0 K/UL (ref 0–0.2)
PLATELET # BLD AUTO: 177 K/UL (ref 150–450)
PMV BLD AUTO: 10 FL (ref 9.4–12.3)
POTASSIUM SERPL-SCNC: 3.8 MMOL/L (ref 3.5–5.1)
PROTHROMBIN TIME: 11.6 SEC (ref 11.3–14.9)
RBC # BLD AUTO: 5.03 M/UL (ref 4.23–5.6)
SERVICE CMNT-IMP: ABNORMAL
SERVICE CMNT-IMP: ABNORMAL
SODIUM SERPL-SCNC: 138 MMOL/L (ref 136–146)
TRIGL SERPL-MCNC: 533 MG/DL (ref 35–150)
TROPONIN I SERPL HS-MCNC: 8 PG/ML (ref 0–14)
UFH PPP CHRO-ACNC: <0.1 IU/ML (ref 0.3–0.7)
UFH PPP CHRO-ACNC: <0.1 IU/ML (ref 0.3–0.7)
VLDLC SERPL CALC-MCNC: 106.6 MG/DL (ref 6–23)
WBC # BLD AUTO: 8 K/UL (ref 4.3–11.1)

## 2024-03-01 PROCEDURE — 93458 L HRT ARTERY/VENTRICLE ANGIO: CPT | Performed by: INTERNAL MEDICINE

## 2024-03-01 PROCEDURE — 99152 MOD SED SAME PHYS/QHP 5/>YRS: CPT | Performed by: INTERNAL MEDICINE

## 2024-03-01 PROCEDURE — 93010 ELECTROCARDIOGRAM REPORT: CPT | Performed by: INTERNAL MEDICINE

## 2024-03-01 PROCEDURE — 83721 ASSAY OF BLOOD LIPOPROTEIN: CPT

## 2024-03-01 PROCEDURE — 83036 HEMOGLOBIN GLYCOSYLATED A1C: CPT

## 2024-03-01 PROCEDURE — 36415 COLL VENOUS BLD VENIPUNCTURE: CPT

## 2024-03-01 PROCEDURE — 93306 TTE W/DOPPLER COMPLETE: CPT

## 2024-03-01 PROCEDURE — 85027 COMPLETE CBC AUTOMATED: CPT

## 2024-03-01 PROCEDURE — G0378 HOSPITAL OBSERVATION PER HR: HCPCS

## 2024-03-01 PROCEDURE — 6360000002 HC RX W HCPCS: Performed by: INTERNAL MEDICINE

## 2024-03-01 PROCEDURE — 2500000003 HC RX 250 WO HCPCS: Performed by: INTERNAL MEDICINE

## 2024-03-01 PROCEDURE — 82962 GLUCOSE BLOOD TEST: CPT

## 2024-03-01 PROCEDURE — 96366 THER/PROPH/DIAG IV INF ADDON: CPT

## 2024-03-01 PROCEDURE — 99202 OFFICE O/P NEW SF 15 MIN: CPT | Performed by: INTERNAL MEDICINE

## 2024-03-01 PROCEDURE — 6370000000 HC RX 637 (ALT 250 FOR IP): Performed by: NURSE PRACTITIONER

## 2024-03-01 PROCEDURE — 6360000002 HC RX W HCPCS: Performed by: NURSE PRACTITIONER

## 2024-03-01 PROCEDURE — 85520 HEPARIN ASSAY: CPT

## 2024-03-01 PROCEDURE — 2580000003 HC RX 258: Performed by: NURSE PRACTITIONER

## 2024-03-01 PROCEDURE — C1894 INTRO/SHEATH, NON-LASER: HCPCS | Performed by: INTERNAL MEDICINE

## 2024-03-01 PROCEDURE — 80048 BASIC METABOLIC PNL TOTAL CA: CPT

## 2024-03-01 PROCEDURE — C1769 GUIDE WIRE: HCPCS | Performed by: INTERNAL MEDICINE

## 2024-03-01 PROCEDURE — 84484 ASSAY OF TROPONIN QUANT: CPT

## 2024-03-01 PROCEDURE — 2709999900 HC NON-CHARGEABLE SUPPLY: Performed by: INTERNAL MEDICINE

## 2024-03-01 PROCEDURE — 80061 LIPID PANEL: CPT

## 2024-03-01 RX ORDER — IBUPROFEN 600 MG/1
1 TABLET ORAL PRN
Status: DISCONTINUED | OUTPATIENT
Start: 2024-03-01 | End: 2024-03-01 | Stop reason: HOSPADM

## 2024-03-01 RX ORDER — ONDANSETRON 2 MG/ML
4 INJECTION INTRAMUSCULAR; INTRAVENOUS EVERY 4 HOURS PRN
Status: DISCONTINUED | OUTPATIENT
Start: 2024-03-01 | End: 2024-03-01 | Stop reason: HOSPADM

## 2024-03-01 RX ORDER — MAGNESIUM SULFATE IN WATER 40 MG/ML
2000 INJECTION, SOLUTION INTRAVENOUS PRN
Status: DISCONTINUED | OUTPATIENT
Start: 2024-03-01 | End: 2024-03-01 | Stop reason: HOSPADM

## 2024-03-01 RX ORDER — INSULIN GLARGINE 100 [IU]/ML
30 INJECTION, SOLUTION SUBCUTANEOUS NIGHTLY
Qty: 5 ADJUSTABLE DOSE PRE-FILLED PEN SYRINGE | Refills: 3 | Status: SHIPPED | OUTPATIENT
Start: 2024-03-01

## 2024-03-01 RX ORDER — LIDOCAINE HYDROCHLORIDE 10 MG/ML
INJECTION, SOLUTION INFILTRATION; PERINEURAL PRN
Status: DISCONTINUED | OUTPATIENT
Start: 2024-03-01 | End: 2024-03-01 | Stop reason: HOSPADM

## 2024-03-01 RX ORDER — POTASSIUM CHLORIDE 7.45 MG/ML
10 INJECTION INTRAVENOUS PRN
Status: DISCONTINUED | OUTPATIENT
Start: 2024-03-01 | End: 2024-03-01 | Stop reason: HOSPADM

## 2024-03-01 RX ORDER — HEPARIN SODIUM 10000 [USP'U]/100ML
5-30 INJECTION, SOLUTION INTRAVENOUS CONTINUOUS
Status: DISCONTINUED | OUTPATIENT
Start: 2024-03-01 | End: 2024-03-01 | Stop reason: HOSPADM

## 2024-03-01 RX ORDER — ATORVASTATIN CALCIUM 40 MG/1
40 TABLET, FILM COATED ORAL NIGHTLY
Status: DISCONTINUED | OUTPATIENT
Start: 2024-03-01 | End: 2024-03-01 | Stop reason: HOSPADM

## 2024-03-01 RX ORDER — INSULIN LISPRO 100 [IU]/ML
0-4 INJECTION, SOLUTION INTRAVENOUS; SUBCUTANEOUS NIGHTLY
Status: DISCONTINUED | OUTPATIENT
Start: 2024-03-01 | End: 2024-03-01 | Stop reason: HOSPADM

## 2024-03-01 RX ORDER — SODIUM CHLORIDE 0.9 % (FLUSH) 0.9 %
5-40 SYRINGE (ML) INJECTION PRN
Status: DISCONTINUED | OUTPATIENT
Start: 2024-03-01 | End: 2024-03-01 | Stop reason: HOSPADM

## 2024-03-01 RX ORDER — TIZANIDINE 2 MG/1
2 TABLET ORAL 3 TIMES DAILY PRN
Status: DISCONTINUED | OUTPATIENT
Start: 2024-03-01 | End: 2024-03-01 | Stop reason: HOSPADM

## 2024-03-01 RX ORDER — ACETAMINOPHEN 650 MG/1
650 SUPPOSITORY RECTAL EVERY 6 HOURS PRN
Status: DISCONTINUED | OUTPATIENT
Start: 2024-03-01 | End: 2024-03-01 | Stop reason: HOSPADM

## 2024-03-01 RX ORDER — DEXTROSE MONOHYDRATE 100 MG/ML
INJECTION, SOLUTION INTRAVENOUS CONTINUOUS PRN
Status: DISCONTINUED | OUTPATIENT
Start: 2024-03-01 | End: 2024-03-01 | Stop reason: HOSPADM

## 2024-03-01 RX ORDER — SODIUM CHLORIDE 0.9 % (FLUSH) 0.9 %
5-40 SYRINGE (ML) INJECTION EVERY 12 HOURS SCHEDULED
Status: DISCONTINUED | OUTPATIENT
Start: 2024-03-01 | End: 2024-03-01 | Stop reason: HOSPADM

## 2024-03-01 RX ORDER — GLIPIZIDE 5 MG/1
10 TABLET ORAL 2 TIMES DAILY WITH MEALS
Status: DISCONTINUED | OUTPATIENT
Start: 2024-03-01 | End: 2024-03-01 | Stop reason: HOSPADM

## 2024-03-01 RX ORDER — SODIUM CHLORIDE 9 MG/ML
INJECTION, SOLUTION INTRAVENOUS CONTINUOUS
Status: DISCONTINUED | OUTPATIENT
Start: 2024-03-01 | End: 2024-03-01 | Stop reason: HOSPADM

## 2024-03-01 RX ORDER — ACETAMINOPHEN 325 MG/1
650 TABLET ORAL EVERY 6 HOURS PRN
Status: DISCONTINUED | OUTPATIENT
Start: 2024-03-01 | End: 2024-03-01 | Stop reason: HOSPADM

## 2024-03-01 RX ORDER — ATORVASTATIN CALCIUM 40 MG/1
40 TABLET, FILM COATED ORAL NIGHTLY
Qty: 30 TABLET | Refills: 3 | Status: SHIPPED | OUTPATIENT
Start: 2024-03-01

## 2024-03-01 RX ORDER — HEPARIN SODIUM 1000 [USP'U]/ML
2000 INJECTION, SOLUTION INTRAVENOUS; SUBCUTANEOUS PRN
Status: DISCONTINUED | OUTPATIENT
Start: 2024-03-01 | End: 2024-03-01 | Stop reason: HOSPADM

## 2024-03-01 RX ORDER — MIDAZOLAM HYDROCHLORIDE 1 MG/ML
INJECTION INTRAMUSCULAR; INTRAVENOUS PRN
Status: DISCONTINUED | OUTPATIENT
Start: 2024-03-01 | End: 2024-03-01 | Stop reason: HOSPADM

## 2024-03-01 RX ORDER — HEPARIN SODIUM 1000 [USP'U]/ML
4000 INJECTION, SOLUTION INTRAVENOUS; SUBCUTANEOUS PRN
Status: DISCONTINUED | OUTPATIENT
Start: 2024-03-01 | End: 2024-03-01 | Stop reason: HOSPADM

## 2024-03-01 RX ORDER — INSULIN GLARGINE 100 [IU]/ML
30 INJECTION, SOLUTION SUBCUTANEOUS ONCE AS NEEDED
Status: COMPLETED | OUTPATIENT
Start: 2024-03-01 | End: 2024-03-01

## 2024-03-01 RX ORDER — TRAZODONE HYDROCHLORIDE 50 MG/1
150 TABLET ORAL
Status: DISCONTINUED | OUTPATIENT
Start: 2024-03-01 | End: 2024-03-01 | Stop reason: HOSPADM

## 2024-03-01 RX ORDER — NITROGLYCERIN 0.4 MG/1
0.4 TABLET SUBLINGUAL EVERY 5 MIN PRN
Status: DISCONTINUED | OUTPATIENT
Start: 2024-03-01 | End: 2024-03-01 | Stop reason: HOSPADM

## 2024-03-01 RX ORDER — INSULIN LISPRO 100 [IU]/ML
0-4 INJECTION, SOLUTION INTRAVENOUS; SUBCUTANEOUS
Status: DISCONTINUED | OUTPATIENT
Start: 2024-03-01 | End: 2024-03-01 | Stop reason: HOSPADM

## 2024-03-01 RX ORDER — ASPIRIN 81 MG/1
81 TABLET ORAL DAILY
Status: DISCONTINUED | OUTPATIENT
Start: 2024-03-01 | End: 2024-03-01 | Stop reason: HOSPADM

## 2024-03-01 RX ORDER — POTASSIUM CHLORIDE 20 MEQ/1
40 TABLET, EXTENDED RELEASE ORAL PRN
Status: DISCONTINUED | OUTPATIENT
Start: 2024-03-01 | End: 2024-03-01 | Stop reason: HOSPADM

## 2024-03-01 RX ORDER — POLYETHYLENE GLYCOL 3350 17 G/17G
17 POWDER, FOR SOLUTION ORAL DAILY PRN
Status: DISCONTINUED | OUTPATIENT
Start: 2024-03-01 | End: 2024-03-01 | Stop reason: HOSPADM

## 2024-03-01 RX ORDER — HEPARIN SODIUM 200 [USP'U]/100ML
INJECTION, SOLUTION INTRAVENOUS CONTINUOUS PRN
Status: COMPLETED | OUTPATIENT
Start: 2024-03-01 | End: 2024-03-01

## 2024-03-01 RX ADMIN — ACETAMINOPHEN 650 MG: 325 TABLET ORAL at 09:44

## 2024-03-01 RX ADMIN — INSULIN GLARGINE 30 UNITS: 100 INJECTION, SOLUTION SUBCUTANEOUS at 14:50

## 2024-03-01 RX ADMIN — SODIUM CHLORIDE: 9 INJECTION, SOLUTION INTRAVENOUS at 03:28

## 2024-03-01 RX ADMIN — ASPIRIN 81 MG: 81 TABLET, COATED ORAL at 09:46

## 2024-03-01 RX ADMIN — SODIUM CHLORIDE, PRESERVATIVE FREE 10 ML: 5 INJECTION INTRAVENOUS at 09:47

## 2024-03-01 RX ADMIN — INSULIN LISPRO 1 UNITS: 100 INJECTION, SOLUTION INTRAVENOUS; SUBCUTANEOUS at 09:30

## 2024-03-01 RX ADMIN — INSULIN HUMAN 5 UNITS: 100 INJECTION, SOLUTION PARENTERAL at 03:34

## 2024-03-01 RX ADMIN — NITROGLYCERIN 1 INCH: 20 OINTMENT TOPICAL at 06:06

## 2024-03-01 RX ADMIN — HEPARIN SODIUM 4000 UNITS: 1000 INJECTION INTRAVENOUS; SUBCUTANEOUS at 06:03

## 2024-03-01 ASSESSMENT — PAIN DESCRIPTION - ORIENTATION
ORIENTATION: MID
ORIENTATION: RIGHT

## 2024-03-01 ASSESSMENT — PAIN DESCRIPTION - DIRECTION: RADIATING_TOWARDS: L ARM

## 2024-03-01 ASSESSMENT — PAIN DESCRIPTION - DESCRIPTORS
DESCRIPTORS: DISCOMFORT
DESCRIPTORS: SQUEEZING

## 2024-03-01 ASSESSMENT — PAIN DESCRIPTION - FREQUENCY: FREQUENCY: INTERMITTENT

## 2024-03-01 ASSESSMENT — ENCOUNTER SYMPTOMS
RESPIRATORY NEGATIVE: 1
ALLERGIC/IMMUNOLOGIC NEGATIVE: 1
EYES NEGATIVE: 1
GASTROINTESTINAL NEGATIVE: 1

## 2024-03-01 ASSESSMENT — PAIN SCALES - GENERAL
PAINLEVEL_OUTOF10: 2
PAINLEVEL_OUTOF10: 0
PAINLEVEL_OUTOF10: 2

## 2024-03-01 ASSESSMENT — PAIN DESCRIPTION - LOCATION
LOCATION: CHEST
LOCATION: CHEST

## 2024-03-01 NOTE — DIABETES MGMT
Patient admitted with CAD patient had heart cath. Admitting blood glucose 357. HbA1c 10.5%. Most recent poc glucose 232. Creatinine 0.90. GFR >60. Patient had heart cath today which per provider was clean, patient to discharge today.    Patient seen for assessment regarding diabetes management. Patient states they have been living with diabetes for 5 years and voices a positive family history of diabetes. Patient states they do not have a working glucometer with supplies at home. Patient states they are currently taking Metformin 1000mg BID and Glipizide 10mg BID at home for management of diabetes. Patient voices that they have experienced hypoglycemia in the past. Educated regarding hypoglycemia signs, symptoms, and treatment.     Patient given educational material, \"Diabetes Self-Management: A Patient Teaching Guide\", which was reviewed with patient. Explained basic physiology of diabetes, as well as causes, signs and symptoms, and treatments for hypoglycemia and hyperglycemia. Described the effects of poor glycemic control and the development of long-term complications such as renal, eye, nerve, and cardiovascular disease. Patient does report smoking 1/2 ppd. 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 Patient voices numbness and tingling in feet from back stenosis. Described proper diabetic foot care and the importance of checking feet daily. Per patient they typically drink water and sugar free drinks. Reviewed effects of sweetened beverages on glycemic control and discussed alternative beverages to help improve glycemic control. 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 Educated re: effects of carbohydrates on blood glucose, the \"plate method\" of healthy meal

## 2024-03-01 NOTE — DISCHARGE SUMMARY
[facility] Cardiology Discharge Summary     Patient ID:  Esteban Muhammad  [id number]  48 y.o.  1975    Admit date: 3/1/2024    Discharge date:  24    Admitting Physician: Prieto Mello DO     Discharge Physician: LEONELA Bond - CNP/Dr. Shah    Admission Diagnoses: Chest pain [R07.9]    Discharge Diagnoses: Principal Problem (Resolved):    Chest pain  Active Problems:    CAD (coronary artery disease)    DM2 (diabetes mellitus, type 2) (HCC)    HLD (hyperlipidemia)    History of medication noncompliance       Cardiology Procedures this admission:  Cardiac procedures performed during your hospitalization:  Echocardiogram and Left Heart Catheterization    Consults: none    Hospital Course: Patient was seen at the ED for complaints of CP without HS Trop Elevation and was subsequently scheduled for a [facility] that showed the followin/01/24    CARDIAC PROCEDURE 2024  1:15 PM (Final)    Conclusion  1.  Normal left-ventricular systolic function  2.  Atherosclerotic coronary artery disease as described.  There is proximal and middle LAD disease that is not severe.  The left circumflex and right coronary arteries show no obstruction.    Signed by: Scott Shah MD on 3/1/2024  1:15 PM      No results found for this or any previous visit.    The patient continues to have elevated HGB A1C he will continue his home DM medications except for glipizide.  He will start his Metformin in 3 days and will start Lantus nightly.  The patient will also  Will have diabetes management see the patient and follow up with his home PCP for DM.    Patient tolerated the procedure well and was taken to the telemetry floor/CCL Prep/Recovery for recovery. The following morning patient was up feeling well without any complaints of chest pain or shortness of breath. Patient's right radial cath site was clean, dry and intact without hematoma or bruit. Patient's labs were WNL. Patient was seen and examined by   9:49 AM   Result Value Ref Range    POC Glucose 232 (H) 65 - 100 mg/dL    Performed by: Em    Cardiac procedure    Collection Time: 03/01/24  1:11 PM   Result Value Ref Range    Body Surface Area 2.46 m2         Patient Instructions:     Current Discharge Medication List        START taking these medications    Details   atorvastatin (LIPITOR) 40 MG tablet Take 1 tablet by mouth nightly  Qty: 30 tablet, Refills: 3      blood glucose monitor kit and supplies Dispense sufficient amount for indicated testing frequency plus additional to accommodate PRN testing needs. Dispense all needed supplies to include: monitor, strips, lancing device, lancets, control solutions, alcohol swabs.  Qty: 1 kit, Refills: 0    Comments: Brand per patient preference. May round up to next available package size.      Insulin Pen Needle 32G X 4 MM MISC 1 each by Does not apply route daily  Qty: 100 each, Refills: 3           CONTINUE these medications which have CHANGED    Details   insulin glargine (LANTUS SOLOSTAR) 100 UNIT/ML injection pen Inject 30 Units into the skin nightly  Qty: 5 Adjustable Dose Pre-filled Pen Syringe, Refills: 3           CONTINUE these medications which have NOT CHANGED    Details   tiZANidine (ZANAFLEX) 2 MG tablet Take 1 tablet by mouth 3 times daily as needed (prn)  Qty: 90 tablet, Refills: 1    Associated Diagnoses: Spinal stenosis of lumbar region with neurogenic claudication      aspirin 81 MG EC tablet Take 1 tablet by mouth daily      traZODone (DESYREL) 150 MG tablet Take 1 tablet by mouth nightly      meloxicam (MOBIC) 15 MG tablet Take 1 tablet by mouth daily for 7 days  Qty: 7 tablet, Refills: 0      ibuprofen (ADVIL;MOTRIN) 600 MG tablet Take 1 tablet by mouth 3 times daily as needed for Pain  Qty: 30 tablet, Refills: 0      metFORMIN (GLUCOPHAGE) 1000 MG tablet Take 1,000 mg by mouth 2 times daily      nitroGLYCERIN (NITROLINGUAL) 0.4 MG/SPRAY 0.4 mg spray Place 1 spray under the

## 2024-03-01 NOTE — PROGRESS NOTES
TRANSFER - OUT REPORT:    Verbal report given to RN [id number] on Esteban Muhammad  being transferred to 330 for routine progression of patient care       Report consisted of patient's Situation, Background, Assessment and   Recommendations(SBAR).     Information from the following report(s) Nurse Handoff Report was reviewed with the receiving nurse.           Lines:   Peripheral IV 03/01/24 Posterior;Right Hand (Active)   Site Assessment Clean, dry & intact 03/01/24 0737   Line Status Infusing 03/01/24 0737   Line Care Connections checked and tightened 03/01/24 0737   Phlebitis Assessment No symptoms 03/01/24 0737   Infiltration Assessment 0 03/01/24 0737   Alcohol Cap Used Yes 03/01/24 0737   Dressing Status Clean, dry & intact 03/01/24 0737   Dressing Type Transparent 03/01/24 0737   Dressing Intervention New 03/01/24 0330       Peripheral IV 03/01/24 Left Forearm (Active)        Opportunity for questions and clarification was provided.      Patient transported with:  Registered Nurse    [facility] w Shah  Diagnostic  RRA - 12    2 mg Versed  5000 units Heparin

## 2024-03-01 NOTE — TELEPHONE ENCOUNTER
----- Message from LEONELA Nj - CNP sent at 3/1/2024  1:44 PM EST -----  Please schedule this patient for follow up after [facility] in 2-4 weeks

## 2024-03-01 NOTE — PROGRESS NOTES
Pt arrived to 3308 via EMS from [facility] [place] [facility] ED. Pt VSS, A&Ox4. Heparin drip infusing at 8units/kg/hr, verified with Venkatesh APONTE.
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
## 2024-03-01 NOTE — INTERDISCIPLINARY ROUNDS
Multi-D Rounds/Checklist (leapfrog):  Lines: can any be removed?: None      DVT Prophylaxis: Ordered  Vent: N/A  Nutrition Ordered/appropriate: Per Primary Team  Can antibiotics or other drugs be stopped? N/A Yes/No  Inpat Anti-Infectives (From admission, onward)      None          Consults needed: None  A: Is pain control adequate? (has PRNs? Stop drip?) Yes  B: Sedation break and SBT? N/A  C: Is sedation choice appropriate? N/A  D: Delirium/CAM-ICU? No  E: Mobility goals/appropriateness? Yes  F: Family update and plan?  No family available to serve as surrogate decision maker .     GARO Herring

## 2024-03-01 NOTE — ED PROVIDER NOTES
[employer] Emergency Department Provider Note                   PCP:                No, Pcp               Age: 48 y.o.      Sex: male     MEDICAL DECISION MAKING  Complexity of Problems Addressed:   1 or more acute illness/injury that poses a threat to life or bodily function    Data Reviewed and Analyzed:  Category 1:    I have reviewed outside records from an external source for any pertinent PMH, ED visits, primary care visits, specialist visits, labs, EKG, and/or radiologic studies.    Category 2:     ED EKG Interpretation  EKG was interpreted in the absence of a cardiologist.    Rate: Rate: Normal  EKG Interpretation: EKG Interpretation: sinus rhythm, no evidence of arrhythmia  ST Segments: Normal ST segments - NO STEMI      I independently ordered and reviewed the labs.    I have reviewed the Radiologist interpretation of the radiologic studies. My independent interpretation of the chest x-ray shows no pleural effusion.  My medical decision making regarding the radiologic studies is based on the interpretation report of the board certified Radiologist.            The patient was admitted and I have discussed patient management with the admitting provider.    Category 3:     Discussion of management or test interpretation:    MDM  Number of Diagnoses or Management Options  Chest pain, unspecified type  Diagnosis management comments: Patient with a history of coronary artery disease presents for evaluation of chest pain starting prior to arrival.  Patient was diaphoretic and comfortable when he arrived.  His vital signs were stable.  He was given aspirin and nitroglycerin which did improve his chest pain.  His EKG showed normal sinus rhythm no acute ischemic ST changes.  His serial troponins were normal and ACS was ruled out.  His D-dimer was normal and PE excluded.  His CBC and BMP showed no significant abnormalities other than his blood sugar is elevated in the 300s.  He has a normal bicarb and gap and no
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
## 2024-03-01 NOTE — CARE COORDINATION
Pt seen in CCU s/p admission chest pain and heart cath. D/c home this day. Staying at recovery house currently where he has been 4 months and states he has been clean 1 year and 8 months. Confirms demographics/screen. No PCP, and agrees with referral to [facility] [place] [place] and referral sent. States Recovery will pick him up when he calls. Family in [place]. DM saw and education given with glucometer. NO other needs voiced for transition of care.     ARAIZA and BILL IL completed. Pt agrees with POC for d/c this day. RN witnessed verbalization.        03/01/24 1594   Service Assessment   Patient Orientation Alert and Oriented   Cognition Alert   History Provided By Patient   Primary Caregiver Self   Accompanied By/Relationship none   Support Systems Parent;Friends/Neighbors   Patient's Healthcare Decision Maker is: Legal Next of Kin   PCP Verified by CM No   Prior Functional Level Independent in ADLs/IADLs   Current Functional Level Assistance with the following:   Can patient return to prior living arrangement Yes   Ability to make needs known: Good   Family able to assist with home care needs: Yes   Would you like for me to discuss the discharge plan with any other family members/significant others, and if so, who? Yes   Financial Resources Medicare;Medicaid  ([employer]/[facility])   Community Resources Other (Comment)  ([facility])   CM/SW Referral No PCP;Behavioral management problem  ([facility])   Social/Functional History   Lives With Other (comment)  ([facility])   Type of Home House   Home Equipment   (Glucometer per DM)   ADL Assistance Independent   Homemaking Assistance Independent   Homemaking Responsibilities Yes   Ambulation Assistance Independent   Transfer Assistance Independent   Active  Yes   Discharge Planning   Type of Residence Other (Comment)  ([facility])   Living Arrangements Other (Comment)  ([facility])   Current Services Prior To Admission Durable Medical Equipment   Current DME Prior to Arrival

## 2024-03-01 NOTE — ED NOTES
TRANSFER - OUT REPORT:    Verbal report given to FADI Harley on Esteban Muhammad  being transferred to [facility] for routine progression of patient care       Report consisted of patient's Situation, Background, Assessment and   Recommendations(SBAR).     Information from the following report(s) Nurse Handoff Report was reviewed with the receiving nurse.    Brigantine Fall Assessment:    Presents to emergency department  because of falls (Syncope, seizure, or loss of consciousness): No  Age > 70: No  Altered Mental Status, Intoxication with alcohol or substance confusion (Disorientation, impaired judgment, poor safety awaremess, or inability to follow instructions): No  Impaired Mobility: Ambulates or transfers with assistive devices or assistance; Unable to ambulate or transer.: No  Nursing Judgement: No          Lines:   Peripheral IV 02/29/24 Right Antecubital (Active)   Site Assessment Clean, dry & intact 02/29/24 1953   Line Status Blood return noted;Flushed 02/29/24 1953   Dressing Status New dressing applied;Clean, dry & intact 02/29/24 1953   Dressing Type Transparent 02/29/24 1953   Dressing Intervention New 02/29/24 1953        Opportunity for questions and clarification was provided.      Patient transported with:  Monitor  Heparin  ACLS transporters        No

## 2024-03-01 NOTE — PROGRESS NOTES
TRANSFER - IN REPORT:    Verbal report received from Ronnell APONTE on Esteban A Lehoux  being [place] [facility] [place] progression of patient care      Report consisted of patient's Situation, Background, Assessment and   Recommendations(SBAR).     Information from the following report(s) Nurse Handoff Report was reviewed with the receiving nurse.    Opportunity for questions and clarification was provided.      Assessment completed upon patient's arrival to unit and care assumed.

## 2024-03-01 NOTE — PROGRESS NOTES
TRANSFER - IN REPORT:    Verbal report received from Aida APONTE on Esteban A Lehoux  being received from [facility] for ordered procedure      Report consisted of patient's Situation, Background, Assessment and   Recommendations(SBAR).     Information from the following report(s) Nurse Handoff Report, Surgery Report, Intake/Output, and MAR was reviewed with the receiving nurse.    Opportunity for questions and clarification was provided.      Assessment completed upon patient's arrival to unit and care assumed.

## 2024-03-02 NOTE — PROGRESS NOTES
Patient discharged to home. Discharge instructions given to go home.voiced understanding of instructions

## 2024-03-16 ENCOUNTER — APPOINTMENT (OUTPATIENT)
Dept: CT IMAGING | Age: 49
End: 2024-03-16
Payer: MEDICARE

## 2024-03-16 ENCOUNTER — HOSPITAL ENCOUNTER (EMERGENCY)
Age: 49
Discharge: HOME | End: 2024-03-17
Attending: GENERAL PRACTICE
Payer: MEDICARE

## 2024-03-16 VITALS
OXYGEN SATURATION: 95 % | SYSTOLIC BLOOD PRESSURE: 112 MMHG | HEART RATE: 78 BPM | DIASTOLIC BLOOD PRESSURE: 77 MMHG | HEIGHT: 73 IN | TEMPERATURE: 98.2 F | RESPIRATION RATE: 18 BRPM | WEIGHT: 272 LBS | BODY MASS INDEX: 36.05 KG/M2

## 2024-03-16 DIAGNOSIS — R20.2 PARESTHESIA OF LEFT LEG: Primary | ICD-10-CM

## 2024-03-16 DIAGNOSIS — E11.65 HYPERGLYCEMIA DUE TO DIABETES MELLITUS (HCC): ICD-10-CM

## 2024-03-16 LAB
ANION GAP SERPL CALC-SCNC: 10 MMOL/L (ref 2–11)
APPEARANCE UR: CLEAR
BACTERIA URNS QL MICRO: 0 /HPF
BASOPHILS # BLD: 0.1 K/UL (ref 0–0.2)
BASOPHILS NFR BLD: 1 % (ref 0–2)
BILIRUB UR QL: NEGATIVE
BUN SERPL-MCNC: 17 MG/DL (ref 6–23)
CALCIUM SERPL-MCNC: 9 MG/DL (ref 8.3–10.4)
CHLORIDE SERPL-SCNC: 101 MMOL/L (ref 103–113)
CO2 SERPL-SCNC: 24 MMOL/L (ref 21–32)
COLOR UR: ABNORMAL
CREAT SERPL-MCNC: 0.96 MG/DL (ref 0.8–1.5)
DIFFERENTIAL METHOD BLD: NORMAL
EOSINOPHIL # BLD: 0.2 K/UL (ref 0–0.8)
EOSINOPHIL NFR BLD: 2 % (ref 0.5–7.8)
ERYTHROCYTE [DISTWIDTH] IN BLOOD BY AUTOMATED COUNT: 12.5 % (ref 11.9–14.6)
GLUCOSE BLD STRIP.AUTO-MCNC: 290 MG/DL (ref 65–100)
GLUCOSE SERPL-MCNC: 470 MG/DL (ref 65–100)
GLUCOSE UR STRIP.AUTO-MCNC: >1000 MG/DL
HCT VFR BLD AUTO: 45.9 % (ref 41.1–50.3)
HGB BLD-MCNC: 15.7 G/DL (ref 13.6–17.2)
HGB UR QL STRIP: NEGATIVE
IMM GRANULOCYTES # BLD AUTO: 0.1 K/UL (ref 0–0.5)
IMM GRANULOCYTES NFR BLD AUTO: 1 % (ref 0–5)
KETONES UR QL STRIP.AUTO: NEGATIVE MG/DL
LEUKOCYTE ESTERASE UR QL STRIP.AUTO: NEGATIVE
LYMPHOCYTES # BLD: 2.3 K/UL (ref 0.5–4.6)
LYMPHOCYTES NFR BLD: 26 % (ref 13–44)
MAGNESIUM SERPL-MCNC: 1.7 MG/DL (ref 1.8–2.4)
MCH RBC QN AUTO: 29.8 PG (ref 26.1–32.9)
MCHC RBC AUTO-ENTMCNC: 34.2 G/DL (ref 31.4–35)
MCV RBC AUTO: 87.3 FL (ref 82–102)
MONOCYTES # BLD: 0.4 K/UL (ref 0.1–1.3)
MONOCYTES NFR BLD: 5 % (ref 4–12)
NEUTS SEG # BLD: 5.7 K/UL (ref 1.7–8.2)
NEUTS SEG NFR BLD: 65 % (ref 43–78)
NITRITE UR QL STRIP.AUTO: NEGATIVE
NRBC # BLD: 0 K/UL (ref 0–0.2)
PH UR STRIP: 5 (ref 5–9)
PLATELET # BLD AUTO: 182 K/UL (ref 150–450)
PLATELET COMMENT: ADEQUATE
PMV BLD AUTO: 11.1 FL (ref 9.4–12.3)
POTASSIUM SERPL-SCNC: 4.4 MMOL/L (ref 3.5–5.1)
PROT UR STRIP-MCNC: NEGATIVE MG/DL
RBC # BLD AUTO: 5.26 M/UL (ref 4.23–5.6)
RBC MORPH BLD: NORMAL
SERVICE CMNT-IMP: ABNORMAL
SODIUM SERPL-SCNC: 135 MMOL/L (ref 136–146)
SP GR UR REFRACTOMETRY: 1.03 (ref 1–1.02)
UROBILINOGEN UR QL STRIP.AUTO: 0.2 EU/DL (ref 0.2–1)
WBC # BLD AUTO: 8.8 K/UL (ref 4.3–11.1)
WBC MORPH BLD: NORMAL

## 2024-03-16 PROCEDURE — 99284 EMERGENCY DEPT VISIT MOD MDM: CPT

## 2024-03-16 PROCEDURE — 96374 THER/PROPH/DIAG INJ IV PUSH: CPT

## 2024-03-16 PROCEDURE — 83735 ASSAY OF MAGNESIUM: CPT

## 2024-03-16 PROCEDURE — 81003 URINALYSIS AUTO W/O SCOPE: CPT

## 2024-03-16 PROCEDURE — 96361 HYDRATE IV INFUSION ADD-ON: CPT

## 2024-03-16 PROCEDURE — 82962 GLUCOSE BLOOD TEST: CPT

## 2024-03-16 PROCEDURE — 80048 BASIC METABOLIC PNL TOTAL CA: CPT

## 2024-03-16 PROCEDURE — 2580000003 HC RX 258

## 2024-03-16 PROCEDURE — 70450 CT HEAD/BRAIN W/O DYE: CPT

## 2024-03-16 PROCEDURE — 6370000000 HC RX 637 (ALT 250 FOR IP)

## 2024-03-16 PROCEDURE — 85025 COMPLETE CBC W/AUTO DIFF WBC: CPT

## 2024-03-16 RX ORDER — 0.9 % SODIUM CHLORIDE 0.9 %
1000 INTRAVENOUS SOLUTION INTRAVENOUS
Status: COMPLETED | OUTPATIENT
Start: 2024-03-16 | End: 2024-03-16

## 2024-03-16 RX ORDER — LANOLIN ALCOHOL/MO/W.PET/CERES
400 CREAM (GRAM) TOPICAL DAILY
Status: DISCONTINUED | OUTPATIENT
Start: 2024-03-16 | End: 2024-03-16 | Stop reason: SDUPTHER

## 2024-03-16 RX ORDER — LANOLIN ALCOHOL/MO/W.PET/CERES
400 CREAM (GRAM) TOPICAL ONCE
Status: COMPLETED | OUTPATIENT
Start: 2024-03-16 | End: 2024-03-16

## 2024-03-16 RX ORDER — LANOLIN ALCOHOL/MO/W.PET/CERES
400 CREAM (GRAM) TOPICAL DAILY
Status: DISCONTINUED | OUTPATIENT
Start: 2024-03-17 | End: 2024-03-16

## 2024-03-16 RX ADMIN — INSULIN HUMAN 6 UNITS: 100 INJECTION, SOLUTION PARENTERAL at 22:03

## 2024-03-16 RX ADMIN — Medication 400 MG: at 23:27

## 2024-03-16 RX ADMIN — SODIUM CHLORIDE 1000 ML: 9 INJECTION, SOLUTION INTRAVENOUS at 22:03

## 2024-03-16 ASSESSMENT — PAIN SCALES - GENERAL: PAINLEVEL_OUTOF10: 6

## 2024-03-16 ASSESSMENT — PAIN - FUNCTIONAL ASSESSMENT: PAIN_FUNCTIONAL_ASSESSMENT: 0-10

## 2024-03-16 ASSESSMENT — PAIN DESCRIPTION - ORIENTATION: ORIENTATION: LEFT

## 2024-03-16 ASSESSMENT — LIFESTYLE VARIABLES
HOW OFTEN DO YOU HAVE A DRINK CONTAINING ALCOHOL: NEVER
HOW MANY STANDARD DRINKS CONTAINING ALCOHOL DO YOU HAVE ON A TYPICAL DAY: PATIENT DOES NOT DRINK

## 2024-03-16 ASSESSMENT — PAIN DESCRIPTION - LOCATION: LOCATION: KNEE

## 2024-04-08 ENCOUNTER — OFFICE VISIT (OUTPATIENT)
Dept: ORTHOPEDIC SURGERY | Age: 49
End: 2024-04-08
Payer: MEDICARE

## 2024-04-08 VITALS — WEIGHT: 272 LBS | BODY MASS INDEX: 36.05 KG/M2 | HEIGHT: 73 IN

## 2024-04-08 DIAGNOSIS — M48.062 SPINAL STENOSIS OF LUMBAR REGION WITH NEUROGENIC CLAUDICATION: Primary | ICD-10-CM

## 2024-04-08 PROCEDURE — G2211 COMPLEX E/M VISIT ADD ON: HCPCS | Performed by: PHYSICIAN ASSISTANT

## 2024-04-08 PROCEDURE — G8427 DOCREV CUR MEDS BY ELIG CLIN: HCPCS | Performed by: PHYSICIAN ASSISTANT

## 2024-04-08 PROCEDURE — 4004F PT TOBACCO SCREEN RCVD TLK: CPT | Performed by: PHYSICIAN ASSISTANT

## 2024-04-08 PROCEDURE — 99214 OFFICE O/P EST MOD 30 MIN: CPT | Performed by: PHYSICIAN ASSISTANT

## 2024-04-08 PROCEDURE — G8417 CALC BMI ABV UP PARAM F/U: HCPCS | Performed by: PHYSICIAN ASSISTANT

## 2024-04-08 RX ORDER — GABAPENTIN 300 MG/1
300 CAPSULE ORAL 3 TIMES DAILY
Qty: 90 CAPSULE | Refills: 2 | Status: SHIPPED | OUTPATIENT
Start: 2024-04-08 | End: 2024-07-07

## 2024-04-08 RX ORDER — LIDOCAINE 50 MG/G
1 PATCH TOPICAL DAILY
Qty: 10 PATCH | Refills: 2 | Status: SHIPPED | OUTPATIENT
Start: 2024-04-08 | End: 2024-04-08 | Stop reason: ALTCHOICE

## 2024-04-08 RX ORDER — LIDOCAINE 50 MG/G
1 PATCH TOPICAL DAILY
Qty: 10 PATCH | Refills: 0 | Status: SHIPPED | OUTPATIENT
Start: 2024-04-08 | End: 2024-04-18

## 2024-04-08 NOTE — PROGRESS NOTES
04/08/24        Name: Esteban Muhammad  YOB: 1975  Gender: male  MRN: [id number]    CC: Follow-up       HPI: Esteban Muhammad is a 48 y.o. male who returns for Follow-up         Patient returns the office today for follow-up.  I last saw the patient in the office 2/19/2024.  He has been undergoing a home exercise program for the last 6 weeks for his back and leg symptoms without meaningful improvement.  He states the exercises are making his symptoms worse at this point.  He now has persistent numbness to the sides and backs of his legs.  He is having some giving way with his legs when he walks.  He felt a \"pop\" recently when twisting to answer someone talking to him.  His pain is up to a 10 out of 10 at times, and is having difficulty with simple tasks such as getting dressed and personal hygiene.  He did find out recently that he is allowed to have gabapentin in his sober house.  He also requests some Lidoderm patches because these have provided some partial relief in the past.    History was obtained by patient      Meds/PSH/PMH/FH/SH: This information has been reviewed.      ALLERGIES:   Allergies   Allergen Reactions    Amitriptyline Other (See Comments)     Aggressive behavior    Hydromorphone Other (See Comments)     \"it was a one time thing.\"    Quetiapine Other (See Comments)     Mood swings- aggressive     Hydrocodone-Acetaminophen Nausea And Vomiting              Physical Examination:    Loss of sensation to light touch to the lateral thighs bilaterally and posterior calves bilaterally in an L5 and S1 distribution.        Imaging:         MRI Result (most recent):  MRI LUMBAR SPINE W WO CONTRAST 11/21/2023    Narrative  EXAM:  MRI Lumbar spine, without and with contrast    COMPARISON:  Lumbar spine MRI 6/21/2022    INDICATION:  Other - See Order Comments;;Fall with new L LE Radiculopathy, hx lumbar fusion,    TECHNICAL: Sagittal T1, sagittal T2, sagittal STIR, axial T1, and axial T2 sequences of

## 2024-04-15 DIAGNOSIS — M48.062 SPINAL STENOSIS OF LUMBAR REGION WITH NEUROGENIC CLAUDICATION: ICD-10-CM

## 2024-04-15 RX ORDER — TIZANIDINE 2 MG/1
2 TABLET ORAL 3 TIMES DAILY PRN
Qty: 90 TABLET | Refills: 1 | OUTPATIENT
Start: 2024-04-15

## 2024-04-17 ENCOUNTER — OFFICE VISIT (OUTPATIENT)
Dept: ORTHOPEDIC SURGERY | Age: 49
End: 2024-04-17
Payer: MEDICARE

## 2024-04-17 DIAGNOSIS — Z98.1 STATUS POST LUMBAR SPINAL FUSION: ICD-10-CM

## 2024-04-17 DIAGNOSIS — M48.062 SPINAL STENOSIS OF LUMBAR REGION WITH NEUROGENIC CLAUDICATION: Primary | ICD-10-CM

## 2024-04-17 PROCEDURE — 62323 NJX INTERLAMINAR LMBR/SAC: CPT | Performed by: PHYSICAL MEDICINE & REHABILITATION

## 2024-04-17 RX ORDER — BETAMETHASONE SODIUM PHOSPHATE AND BETAMETHASONE ACETATE 3; 3 MG/ML; MG/ML
12 INJECTION, SUSPENSION INTRA-ARTICULAR; INTRALESIONAL; INTRAMUSCULAR; SOFT TISSUE ONCE
Status: COMPLETED | OUTPATIENT
Start: 2024-04-17 | End: 2024-04-17

## 2024-04-17 RX ADMIN — BETAMETHASONE SODIUM PHOSPHATE AND BETAMETHASONE ACETATE 12 MG: 3; 3 INJECTION, SUSPENSION INTRA-ARTICULAR; INTRALESIONAL; INTRAMUSCULAR; SOFT TISSUE at 10:10

## 2024-04-17 NOTE — PROGRESS NOTES
Name: Esteban Muhammad  YOB: 1975  Gender: male  MRN: [id number]        Interlaminar NEIL Procedure Note      Procedure: L3-L4 interlaminar epidural steroid injection    Precautions: Esteban Muhammad denies prior sensitivity to steroid, local anesthetic, iodine, or shellfish.       Consent:  Consent was obtained prior to the procedure. The procedure was discussed at length with Esteban Muhammad. He was given the opportunity to ask questions regarding the procedure and its associated risks.  In addition to the potential risks associated with the procedure itself, the patient was informed both verbally and in writing of potential side effects of the use glucocorticoids.  The patient appeared to comprehend the informed consent and desired to have the procedure performed, and informed consent was signed.     He was placed in a prone position on the fluoroscopy table and the skin was prepped and draped in a routine sterile fashion. The areas to be injected were each anesthetized with 1 ml of 1% Lidocaine. A 22 gauge 3.5 inch spinal needle was carefully advanced under fluoroscopic guidance to the L3-L4 interlaminar space. 0.5 ml of 70% of Omnipaque was injected to confirm proper needle placement and absence of subdural or vascular flow Once proper placement was confirmed, 2ml of sterile water and 12 mg of betamethasone were injected through the spinal needle.       Fluoroscopic guidance was used intermittently over a 10-minute period to insure proper needle placement and his safety. A hard copy of the fluoroscopic image has been placed in his chart and is saved on the C-arm hard drive. He was monitored for 30 minutes after the procedure and discharged home in a stable fashion with a routine follow up.    Procedural Diagnosis:     ICD-10-CM    1. Spinal stenosis of lumbar region with neurogenic claudication  M48.062 betamethasone acetate-betamethasone sodium phosphate (CELESTONE) injection 12 mg     XR INJ

## 2024-05-02 ENCOUNTER — OFFICE VISIT (OUTPATIENT)
Dept: ORTHOPEDIC SURGERY | Age: 49
End: 2024-05-02
Payer: MEDICARE

## 2024-05-02 VITALS — WEIGHT: 272 LBS | BODY MASS INDEX: 36.05 KG/M2 | HEIGHT: 73 IN

## 2024-05-02 DIAGNOSIS — M48.062 SPINAL STENOSIS OF LUMBAR REGION WITH NEUROGENIC CLAUDICATION: Primary | ICD-10-CM

## 2024-05-02 PROCEDURE — G8417 CALC BMI ABV UP PARAM F/U: HCPCS | Performed by: PHYSICIAN ASSISTANT

## 2024-05-02 PROCEDURE — 4004F PT TOBACCO SCREEN RCVD TLK: CPT | Performed by: PHYSICIAN ASSISTANT

## 2024-05-02 PROCEDURE — 99214 OFFICE O/P EST MOD 30 MIN: CPT | Performed by: PHYSICIAN ASSISTANT

## 2024-05-02 PROCEDURE — G8427 DOCREV CUR MEDS BY ELIG CLIN: HCPCS | Performed by: PHYSICIAN ASSISTANT

## 2024-05-02 NOTE — PROGRESS NOTES
05/02/24        Name: Esteban Muhammad  YOB: 1975  Gender: male  MRN: [id number]    CC: Follow-up       HPI: Esteban Muhammad is a 48 y.o. male who returns for Follow-up         Patient recently underwent L3-4 interlaminar epidural steroid injection with Dr. Barrera 4/17/2024.  He reports that his symptoms actually got worse after the injection.  He had increased back pain and pain in his legs and his legs have been feeling weaker subjectively.    History was obtained by patient      Meds/PSH/PMH/FH/SH: This information has been reviewed.      ALLERGIES:   Allergies   Allergen Reactions    Amitriptyline Other (See Comments)     Aggressive behavior    Hydromorphone Other (See Comments)     \"it was a one time thing.\"    Quetiapine Other (See Comments)     Mood swings- aggressive     Hydrocodone-Acetaminophen Nausea And Vomiting              Physical Examination:            Imaging:         MRI Result (most recent):  MRI LUMBAR SPINE W WO CONTRAST 11/21/2023    Narrative  EXAM:  MRI Lumbar spine, without and with contrast    COMPARISON:  Lumbar spine MRI 6/21/2022    INDICATION:  Other - See Order Comments;;Fall with new L LE Radiculopathy, hx lumbar fusion,    TECHNICAL: Sagittal T1, sagittal T2, sagittal STIR, axial T1, and axial T2 sequences of the lumbar spine performed. Axial T1 and sagittal T1 fat sat sequences performed following intravenous administration  10 mL Gadolinium.    FINDINGS:  Osseous structures:  Vertebral body height and marrow signal is normal. Alignment is normal. No redemonstrated postsurgical changes of L4-S1 laminectomies and transpedicular and screw fixation.  Conus tip terminates at L1-2.  No retroperitoneal mass nor lymphadenopathy present.      L1-2:  No significant central canal nor neural foraminal narrowing.  L2-3:  Mild diffuse disc bulging and bilateral facet arthropathy without significant spinal canal or neural foraminal narrowing.  L3-4:  Diffuse disc bulging and

## 2024-05-08 ENCOUNTER — OFFICE VISIT (OUTPATIENT)
Age: 49
End: 2024-05-08
Payer: MEDICARE

## 2024-05-08 VITALS — WEIGHT: 253.8 LBS | BODY MASS INDEX: 33.64 KG/M2 | HEIGHT: 73 IN

## 2024-05-08 DIAGNOSIS — M48.062 SPINAL STENOSIS OF LUMBAR REGION WITH NEUROGENIC CLAUDICATION: Primary | ICD-10-CM

## 2024-05-08 PROCEDURE — 99203 OFFICE O/P NEW LOW 30 MIN: CPT | Performed by: ORTHOPAEDIC SURGERY

## 2024-05-08 PROCEDURE — G8417 CALC BMI ABV UP PARAM F/U: HCPCS | Performed by: ORTHOPAEDIC SURGERY

## 2024-05-08 PROCEDURE — G8427 DOCREV CUR MEDS BY ELIG CLIN: HCPCS | Performed by: ORTHOPAEDIC SURGERY

## 2024-05-08 PROCEDURE — 4004F PT TOBACCO SCREEN RCVD TLK: CPT | Performed by: ORTHOPAEDIC SURGERY

## 2024-05-08 NOTE — PROGRESS NOTES
Name: Esteban Muhammad  YOB: 1975  Gender: male  MRN: [id number]  Age: 48 y.o.      Chief Complaint: Back pain and bilateral leg pain    History of Present Illness:      This is a very pleasant 48 y.o. male who presents with a chronic history of back pain and bilateral leg pain.  He states that his legs hurt as well as his back.  He states his pain can get as severe as 10 out of 10 in severity but currently his pain is 5/10 in severity.  He states he also gets numbness and tingling and burning down both of his legs.  He has a previous L4-S1 laminectomy and fusion that was done in 2007 in [place] for what the patient states was for degenerative disc disease.  He did get some relief of his back pain however this was only for a few years.  He has been dealing back pain and leg pain now for a number of years.  He does have a history of former alcohol abuse.  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  He has just been treating this with NSAIDs.  He has not had any physical therapy recently.  He did do an injection at L3-4 which made his symptoms worse.      Medications:       Current Outpatient Medications:     gabapentin (NEURONTIN) 300 MG capsule, Take 1 capsule by mouth 3 times daily for 90 days., Disp: 90 capsule, Rfl: 2    atorvastatin (LIPITOR) 40 MG tablet, Take 1 tablet by mouth nightly, Disp: 30 tablet, Rfl: 3    blood glucose monitor kit and supplies, Dispense sufficient amount for indicated testing frequency plus additional to accommodate PRN testing needs. Dispense all needed supplies to include: monitor, strips, lancing device, lancets, control solutions, alcohol swabs., Disp: 1 kit, Rfl: 0    insulin glargine (LANTUS SOLOSTAR) 100 UNIT/ML injection pen, Inject 30 Units into the skin nightly, Disp: 5 Adjustable Dose Pre-filled Pen Syringe, Rfl: 3    Insulin Pen Needle 32G X 4 MM MISC, 1 each by Does not apply route daily, Disp: 100 each, Rfl: 3    tiZANidine (ZANAFLEX) 2 MG tablet, Take 1 tablet by

## 2024-05-13 ENCOUNTER — TELEPHONE (OUTPATIENT)
Age: 49
End: 2024-05-13

## 2024-05-23 ENCOUNTER — EVALUATION (OUTPATIENT)
Age: 49
End: 2024-05-23
Payer: MEDICARE

## 2024-05-23 ENCOUNTER — HOSPITAL ENCOUNTER (EMERGENCY)
Age: 49
Discharge: HOME | End: 2024-05-24
Payer: MEDICARE

## 2024-05-23 VITALS
SYSTOLIC BLOOD PRESSURE: 142 MMHG | DIASTOLIC BLOOD PRESSURE: 85 MMHG | HEART RATE: 98 BPM | TEMPERATURE: 98.5 F | OXYGEN SATURATION: 96 % | RESPIRATION RATE: 22 BRPM | BODY MASS INDEX: 35.78 KG/M2 | HEIGHT: 73 IN | WEIGHT: 270 LBS

## 2024-05-23 DIAGNOSIS — M54.41 CHRONIC BILATERAL LOW BACK PAIN WITH BILATERAL SCIATICA: Primary | ICD-10-CM

## 2024-05-23 DIAGNOSIS — R68.89 DECREASED ACTIVITY TOLERANCE: ICD-10-CM

## 2024-05-23 DIAGNOSIS — M48.062 SPINAL STENOSIS OF LUMBAR REGION WITH NEUROGENIC CLAUDICATION: ICD-10-CM

## 2024-05-23 DIAGNOSIS — G89.29 CHRONIC BILATERAL LOW BACK PAIN WITH BILATERAL SCIATICA: Primary | ICD-10-CM

## 2024-05-23 DIAGNOSIS — M25.69 DECREASED ROM OF TRUNK AND BACK: ICD-10-CM

## 2024-05-23 DIAGNOSIS — R29.898 DECREASED STRENGTH OF TRUNK AND BACK: ICD-10-CM

## 2024-05-23 DIAGNOSIS — M54.42 CHRONIC BILATERAL LOW BACK PAIN WITH BILATERAL SCIATICA: Primary | ICD-10-CM

## 2024-05-23 DIAGNOSIS — M48.07 SPINAL STENOSIS OF LUMBOSACRAL REGION: Primary | ICD-10-CM

## 2024-05-23 PROCEDURE — 6370000000 HC RX 637 (ALT 250 FOR IP): Performed by: PHYSICIAN ASSISTANT

## 2024-05-23 PROCEDURE — 96372 THER/PROPH/DIAG INJ SC/IM: CPT

## 2024-05-23 PROCEDURE — 99284 EMERGENCY DEPT VISIT MOD MDM: CPT

## 2024-05-23 PROCEDURE — 6360000002 HC RX W HCPCS: Performed by: PHYSICIAN ASSISTANT

## 2024-05-23 PROCEDURE — 97162 PT EVAL MOD COMPLEX 30 MIN: CPT | Performed by: PHYSICAL THERAPIST

## 2024-05-23 PROCEDURE — 97530 THERAPEUTIC ACTIVITIES: CPT | Performed by: PHYSICAL THERAPIST

## 2024-05-23 RX ORDER — METHOCARBAMOL 750 MG/1
750 TABLET, FILM COATED ORAL 4 TIMES DAILY
Qty: 40 TABLET | Refills: 0 | Status: SHIPPED | OUTPATIENT
Start: 2024-05-23 | End: 2024-06-02

## 2024-05-23 RX ORDER — LIDOCAINE 4 G/G
1 PATCH TOPICAL ONCE
Status: DISCONTINUED | OUTPATIENT
Start: 2024-05-23 | End: 2024-05-24 | Stop reason: HOSPADM

## 2024-05-23 RX ORDER — KETOROLAC TROMETHAMINE 30 MG/ML
30 INJECTION, SOLUTION INTRAMUSCULAR; INTRAVENOUS
Status: COMPLETED | OUTPATIENT
Start: 2024-05-23 | End: 2024-05-23

## 2024-05-23 RX ORDER — ONDANSETRON 4 MG/1
4 TABLET, ORALLY DISINTEGRATING ORAL
Status: COMPLETED | OUTPATIENT
Start: 2024-05-23 | End: 2024-05-23

## 2024-05-23 RX ADMIN — KETOROLAC TROMETHAMINE 30 MG: 30 INJECTION, SOLUTION INTRAMUSCULAR at 23:37

## 2024-05-23 RX ADMIN — ONDANSETRON 4 MG: 4 TABLET, ORALLY DISINTEGRATING ORAL at 23:37

## 2024-05-23 ASSESSMENT — PAIN SCALES - GENERAL: PAINLEVEL_OUTOF10: 8

## 2024-05-23 NOTE — PROGRESS NOTES
[facility] ORTH[facility]  [address] [place] [address]  Dept: [phone]      Physical Therapy Initial Assessment     Referring MD: Andres Giang MD  Diagnosis:     ICD-10-CM    1. Chronic bilateral low back pain with bilateral sciatica  M54.42     M54.41     G89.29       2. Decreased ROM of trunk and back  M25.69       3. Decreased strength of trunk and back  R29.898       4. Decreased activity tolerance  R68.89          Therapy precautions:None  Co-morbidities affecting plan of care: PTSD; IDDM    Payor: Payor: [employer]ICARE /  /  /  Billing pattern: Government- total time   Total Timed Procedure Codes: *** min, Total Time: 60 min Modifier needed: No  Episode visit count:  1     PERTINENT MEDICAL HISTORY     Past medical and surgical history:   Past Medical History:   Diagnosis Date    Abdominal pain 01/22/2015    Abrasion of hip 07/28/2015    Bronchitis 01/07/2016    Chronic back pain     Depression     Diabetes (HCC)     Type 2 on oral agents    DVT (deep venous thrombosis) (HCC)     Endocrine disease     Hydrocele     Hyperlipidemia     Joint pain     upper arm    Local skin infection 07/28/2015    Lumbago 11/17/2014    Lumbar degenerative disc disease 11/17/2014    Malaise and fatigue 09/03/2014    Obesity     Other forms of angina pectoris (HCC) 09/03/2014    Psoriasis     PTSD (post-traumatic stress disorder)     Tachycardia 09/03/2014    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      Past Surgical History:   Procedure Laterality Date    APPENDECTOMY  01/2019    Appendectomy     CARDIAC PROCEDURE N/A 3/1/2024    Left heart cath / coronary angiography performed by Sctot Shah MD at [facility] CARDIAC CATH LAB    HERNIA REPAIR Right 05/2019    Right inguinal hernia repair with mesh - no records- in [place]    IR TRANSLUMINAL BALLOON PTA 1ST VEIN  2018    angiogram of the right - [place]    LUMBAR FUSION      L4-L5 fusion     PRE-MALIGNANT / BENIGN SKIN LESION EXCISION      excision of spider bite

## 2024-05-24 NOTE — DISCHARGE INSTRUCTIONS
Take medications as prescribed to help with your symptoms, as we discussed muscle relaxer can be sedating to some patients so do not drive or drink alcohol with this medication.    Follow-up with your spine surgeon as planned.  Return to the ER for any worsening of symptoms

## 2024-05-24 NOTE — ED PROVIDER NOTES
Emergency Department Provider Note       PCP: No, Pcp   Age: 48 y.o.   Sex: male     DISPOSITION Decision To Discharge 05/24/2024 12:01:07 AM       ICD-10-CM    1. Spinal stenosis of lumbosacral region  M48.07           Medical Decision Making     48-year-old male presenting to the emergency department today for evaluation of low back pain that radiates down both legs.  This is a chronic issue for him he is established with spine surgery.  No bowel or bladder incontinence, do not suspect cauda equina.  Patient treated here symptomatically with Toradol.  Patient unable to take steroids due to uncontrolled diabetes.  He is driving himself home so we will avoid any muscle relaxers here as they can be somewhat sedating and patient verbalized understanding and feels comfortable with this, he is agreeable to a prescription to fill outpatient.  Recommend follow-up with his spine surgeon as planned.  Do not feel any emergent imaging needed at this time as this is chronic problem for patient. Return precautions reviewed  ED Course as of 05/24/24 0001   Fri May 24, 2024   0000 Patient reports feeling better and is requesting discharge home.  Will plan to follow-up with spine surgery as planned [KE]      ED Course User Index  [KE] Wendy Tobar PA     1 acute, uncomplicated illness or injury.  Prescription drug management performed.  Shared medical decision making was utilized in creating the patients health plan today.    I independently ordered and reviewed each unique test.  I reviewed external records: ED visit note from an outside group.  I reviewed external records: provider visit note from PCP.  I reviewed external records: provider visit note from outside specialist.                   History     48-year-old male who presented to the emergency department today for evaluation of lumbar back pain that radiates down his legs.  This is a chronic issue for patient, he is established with spine surgery, is in the

## 2024-05-24 NOTE — ED TRIAGE NOTES
Pt presents to ED with c/o increased back pain. Pt has hx of chronic back pain and had physical therapy today. Pt states he is now hearing a clicking sound in his back when he walks and is having increased pain and numbness.

## 2024-05-24 NOTE — ED NOTES
I have reviewed discharge instructions with the patient.  The patient verbalized understanding.    Patient left ED via Discharge Method: ambulatory to Home.    Opportunity for questions and clarification provided.       Patient given 1 scripts.         To continue your aftercare when you leave the hospital, you may receive an automated call from our care team to check in on how you are doing.  This is a free service and part of our promise to provide the best care and service to meet your aftercare needs.” If you have questions, or wish to unsubscribe from this service please call [phone].  Thank you for Choosing our [facility] Emergency Department.

## 2024-05-27 NOTE — PROGRESS NOTES
[facility] ORTHOPAED[facility]  [address] [place] [address]  Dept: [phone]      Physical Therapy Initial Assessment     Referring MD: Andres Giang MD  Diagnosis:     ICD-10-CM    1. Chronic bilateral low back pain with bilateral sciatica  M54.42     M54.41     G89.29       2. Decreased ROM of trunk and back  M25.69       3. Decreased strength of trunk and back  R29.898       4. Decreased activity tolerance  R68.89       5. Spinal stenosis of lumbar region with neurogenic claudication [M48.062]  M48.062          Therapy precautions:None  Co-morbidities affecting plan of care: PTSD; IDDM; CAD.    Payor: Payor: [employer]ICARE /  /  /  Billing pattern: Government- total time   Total Timed Procedure Codes: 20 min, Total Time: 60 min Modifier needed: No  Episode visit count:  1     PERTINENT MEDICAL HISTORY     Past medical and surgical history:   Past Medical History:   Diagnosis Date    Abdominal pain 01/22/2015    Abrasion of hip 07/28/2015    Bronchitis 01/07/2016    Chronic back pain     Depression     Diabetes (HCC)     Type 2 on oral agents    DVT (deep venous thrombosis) (HCC)     Endocrine disease     Hydrocele     Hyperlipidemia     Joint pain     upper arm    Local skin infection 07/28/2015    Lumbago 11/17/2014    Lumbar degenerative disc disease 11/17/2014    Malaise and fatigue 09/03/2014    Obesity     Other forms of angina pectoris (HCC) 09/03/2014    Psoriasis     PTSD (post-traumatic stress disorder)     Tachycardia 09/03/2014    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      Past Surgical History:   Procedure Laterality Date    APPENDECTOMY  01/2019    Appendectomy     CARDIAC PROCEDURE N/A 3/1/2024    Left heart cath / coronary angiography performed by Scott Shah MD at [facility] CARDIAC CATH LAB    HERNIA REPAIR Right 05/2019    Right inguinal hernia repair with mesh - no records- in [place]    IR TRANSLUMINAL BALLOON PTA 1ST VEIN  2018    angiogram of the right - [place]    LUMBAR FUSION

## 2024-05-30 NOTE — PROGRESS NOTES
[facility] ORTHOPAED[facility]  [address] [facility] [address]  Dept: [phone]      Physical Therapy Daily Note     Referring MD: Andres Giang MD  Diagnosis:     ICD-10-CM    1. Chronic bilateral low back pain with bilateral sciatica  M54.42     M54.41     G89.29       2. Decreased ROM of trunk and back  M25.69       3. Decreased strength of trunk and back  R29.898       4. Decreased activity tolerance  R68.89            Therapy precautions:None  Co-morbidities affecting plan of care: PTSD; IDDM; CAD.    Payor: Payor: [employer]ICARE /  /  /  Billing pattern: Government- total time   In 832 Out 913  Total Timed Procedure Codes: 40 min, Total Time: 41 min Modifier needed: No  Episode visit count:  2     PERTINENT MEDICAL HISTORY     Past medical and surgical history:   Past Medical History:   Diagnosis Date    Abdominal pain 01/22/2015    Abrasion of hip 07/28/2015    Bronchitis 01/07/2016    Chronic back pain     Depression     Diabetes (HCC)     Type 2 on oral agents    DVT (deep venous thrombosis) (HCC)     Endocrine disease     Hydrocele     Hyperlipidemia     Joint pain     upper arm    Local skin infection 07/28/2015    Lumbago 11/17/2014    Lumbar degenerative disc disease 11/17/2014    Malaise and fatigue 09/03/2014    Obesity     Other forms of angina pectoris (HCC) 09/03/2014    Psoriasis     PTSD (post-traumatic stress disorder)     Tachycardia 09/03/2014    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      Past Surgical History:   Procedure Laterality Date    APPENDECTOMY  01/2019    Appendectomy     CARDIAC PROCEDURE N/A 3/1/2024    Left heart cath / coronary angiography performed by Scott Shah MD at [facility] CARDIAC CATH LAB    HERNIA REPAIR Right 05/2019    Right inguinal hernia repair with mesh - no records- in [place]    IR TRANSLUMINAL BALLOON PTA 1ST VEIN  2018    angiogram of the right - [place]    LUMBAR FUSION      L4-L5 fusion     PRE-MALIGNANT / BENIGN SKIN LESION EXCISION      excision

## 2024-05-31 ENCOUNTER — TREATMENT (OUTPATIENT)
Age: 49
End: 2024-05-31
Payer: MEDICARE

## 2024-05-31 DIAGNOSIS — M54.42 CHRONIC BILATERAL LOW BACK PAIN WITH BILATERAL SCIATICA: Primary | ICD-10-CM

## 2024-05-31 DIAGNOSIS — G89.29 CHRONIC BILATERAL LOW BACK PAIN WITH BILATERAL SCIATICA: Primary | ICD-10-CM

## 2024-05-31 DIAGNOSIS — M54.41 CHRONIC BILATERAL LOW BACK PAIN WITH BILATERAL SCIATICA: Primary | ICD-10-CM

## 2024-05-31 DIAGNOSIS — R29.898 DECREASED STRENGTH OF TRUNK AND BACK: ICD-10-CM

## 2024-05-31 DIAGNOSIS — M25.69 DECREASED ROM OF TRUNK AND BACK: ICD-10-CM

## 2024-05-31 DIAGNOSIS — R68.89 DECREASED ACTIVITY TOLERANCE: ICD-10-CM

## 2024-05-31 PROCEDURE — 97110 THERAPEUTIC EXERCISES: CPT | Performed by: PHYSICAL THERAPIST

## 2024-05-31 PROCEDURE — 97140 MANUAL THERAPY 1/> REGIONS: CPT | Performed by: PHYSICAL THERAPIST

## 2024-06-04 ENCOUNTER — TREATMENT (OUTPATIENT)
Age: 49
End: 2024-06-04
Payer: MEDICARE

## 2024-06-04 DIAGNOSIS — M54.42 CHRONIC BILATERAL LOW BACK PAIN WITH BILATERAL SCIATICA: Primary | ICD-10-CM

## 2024-06-04 DIAGNOSIS — R68.89 DECREASED ACTIVITY TOLERANCE: ICD-10-CM

## 2024-06-04 DIAGNOSIS — R29.898 DECREASED STRENGTH OF TRUNK AND BACK: ICD-10-CM

## 2024-06-04 DIAGNOSIS — M54.41 CHRONIC BILATERAL LOW BACK PAIN WITH BILATERAL SCIATICA: Primary | ICD-10-CM

## 2024-06-04 DIAGNOSIS — M25.69 DECREASED ROM OF TRUNK AND BACK: ICD-10-CM

## 2024-06-04 DIAGNOSIS — G89.29 CHRONIC BILATERAL LOW BACK PAIN WITH BILATERAL SCIATICA: Primary | ICD-10-CM

## 2024-06-04 PROCEDURE — 97110 THERAPEUTIC EXERCISES: CPT | Performed by: PHYSICAL THERAPIST

## 2024-06-04 NOTE — PROGRESS NOTES
[facility] ORTHOPAED[facility]  [address] [place] [address]  Dept: [phone]      Physical Therapy Daily Note     Referring MD: Andres Giang MD  Diagnosis:     ICD-10-CM    1. Chronic bilateral low back pain with bilateral sciatica  M54.42     M54.41     G89.29       2. Decreased ROM of trunk and back  M25.69       3. Decreased strength of trunk and back  R29.898       4. Decreased activity tolerance  R68.89              Therapy precautions:None  Co-morbidities affecting plan of care: PTSD; IDDM; CAD.    Payor: Payor: [employer]ICARE /  /  /  Billing pattern: Government- total time     Total Timed Procedure Codes: 45 min, Total Time: 45 min Modifier needed: No  Episode visit count:  3     PERTINENT MEDICAL HISTORY     Past medical and surgical history:   Past Medical History:   Diagnosis Date    Abdominal pain 01/22/2015    Abrasion of hip 07/28/2015    Bronchitis 01/07/2016    Chronic back pain     Depression     Diabetes (HCC)     Type 2 on oral agents    DVT (deep venous thrombosis) (HCC)     Endocrine disease     Hydrocele     Hyperlipidemia     Joint pain     upper arm    Local skin infection 07/28/2015    Lumbago 11/17/2014    Lumbar degenerative disc disease 11/17/2014    Malaise and fatigue 09/03/2014    Obesity     Other forms of angina pectoris (HCC) 09/03/2014    Psoriasis     PTSD (post-traumatic stress disorder)     Tachycardia 09/03/2014    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      Past Surgical History:   Procedure Laterality Date    APPENDECTOMY  01/2019    Appendectomy     CARDIAC PROCEDURE N/A 3/1/2024    Left heart cath / coronary angiography performed by Scott Shah MD at [facility] CARDIAC CATH LAB    HERNIA REPAIR Right 05/2019    Right inguinal hernia repair with mesh - no records- in [place]    IR TRANSLUMINAL BALLOON PTA 1ST VEIN  2018    angiogram of the right - [place]    LUMBAR FUSION      L4-L5 fusion     PRE-MALIGNANT / BENIGN SKIN LESION EXCISION      excision of spider

## 2024-06-08 DIAGNOSIS — M48.062 SPINAL STENOSIS OF LUMBAR REGION WITH NEUROGENIC CLAUDICATION: ICD-10-CM

## 2024-06-10 RX ORDER — TIZANIDINE 2 MG/1
2 TABLET ORAL 3 TIMES DAILY PRN
Qty: 90 TABLET | Refills: 1 | OUTPATIENT
Start: 2024-06-10

## 2024-06-12 ENCOUNTER — TREATMENT (OUTPATIENT)
Age: 49
End: 2024-06-12
Payer: MEDICARE

## 2024-06-12 DIAGNOSIS — R68.89 DECREASED ACTIVITY TOLERANCE: ICD-10-CM

## 2024-06-12 DIAGNOSIS — M54.42 CHRONIC BILATERAL LOW BACK PAIN WITH BILATERAL SCIATICA: Primary | ICD-10-CM

## 2024-06-12 DIAGNOSIS — M54.41 CHRONIC BILATERAL LOW BACK PAIN WITH BILATERAL SCIATICA: Primary | ICD-10-CM

## 2024-06-12 DIAGNOSIS — G89.29 CHRONIC BILATERAL LOW BACK PAIN WITH BILATERAL SCIATICA: Primary | ICD-10-CM

## 2024-06-12 DIAGNOSIS — R29.898 DECREASED STRENGTH OF TRUNK AND BACK: ICD-10-CM

## 2024-06-12 DIAGNOSIS — M25.69 DECREASED ROM OF TRUNK AND BACK: ICD-10-CM

## 2024-06-12 PROCEDURE — 97110 THERAPEUTIC EXERCISES: CPT | Performed by: PHYSICAL THERAPIST

## 2024-06-12 NOTE — PROGRESS NOTES
[facility] ORTHOPAED[facility]  [address] [place] [address]  Dept: [phone]      Physical Therapy Daily Note     Referring MD: Andres Giang MD  Diagnosis:     ICD-10-CM    1. Chronic bilateral low back pain with bilateral sciatica  M54.42     M54.41     G89.29       2. Decreased ROM of trunk and back  M25.69       3. Decreased strength of trunk and back  R29.898       4. Decreased activity tolerance  R68.89                Therapy precautions:None  Co-morbidities affecting plan of care: PTSD; IDDM; CAD.    Payor: Payor: [employer]ICARE /  /  /  Billing pattern: Government- total time     Total Timed Procedure Codes: 45 min, Total Time: 45 min Modifier needed: No  Episode visit count:  4     PERTINENT MEDICAL HISTORY     Past medical and surgical history:   Past Medical History:   Diagnosis Date    Abdominal pain 01/22/2015    Abrasion of hip 07/28/2015    Bronchitis 01/07/2016    Chronic back pain     Depression     Diabetes (HCC)     Type 2 on oral agents    DVT (deep venous thrombosis) (HCC)     Endocrine disease     Hydrocele     Hyperlipidemia     Joint pain     upper arm    Local skin infection 07/28/2015    Lumbago 11/17/2014    Lumbar degenerative disc disease 11/17/2014    Malaise and fatigue 09/03/2014    Obesity     Other forms of angina pectoris (HCC) 09/03/2014    Psoriasis     PTSD (post-traumatic stress disorder)     Tachycardia 09/03/2014    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      Past Surgical History:   Procedure Laterality Date    APPENDECTOMY  01/2019    Appendectomy     CARDIAC PROCEDURE N/A 3/1/2024    Left heart cath / coronary angiography performed by Scott Shah MD at [facility] CARDIAC CATH LAB    HERNIA REPAIR Right 05/2019    Right inguinal hernia repair with mesh - no records- in [place]    IR TRANSLUMINAL BALLOON PTA 1ST VEIN  2018    angiogram of the right - [place]    LUMBAR FUSION      L4-L5 fusion     PRE-MALIGNANT / BENIGN SKIN LESION EXCISION      excision of spider

## 2024-06-17 ENCOUNTER — TREATMENT (OUTPATIENT)
Age: 49
End: 2024-06-17
Payer: MEDICARE

## 2024-06-17 DIAGNOSIS — M25.69 DECREASED ROM OF TRUNK AND BACK: ICD-10-CM

## 2024-06-17 DIAGNOSIS — R68.89 DECREASED ACTIVITY TOLERANCE: ICD-10-CM

## 2024-06-17 DIAGNOSIS — M54.42 CHRONIC BILATERAL LOW BACK PAIN WITH BILATERAL SCIATICA: Primary | ICD-10-CM

## 2024-06-17 DIAGNOSIS — G89.29 CHRONIC BILATERAL LOW BACK PAIN WITH BILATERAL SCIATICA: Primary | ICD-10-CM

## 2024-06-17 DIAGNOSIS — M54.41 CHRONIC BILATERAL LOW BACK PAIN WITH BILATERAL SCIATICA: Primary | ICD-10-CM

## 2024-06-17 DIAGNOSIS — R29.898 DECREASED STRENGTH OF TRUNK AND BACK: ICD-10-CM

## 2024-06-17 PROCEDURE — G0283 ELEC STIM OTHER THAN WOUND: HCPCS | Performed by: PHYSICAL THERAPIST

## 2024-06-17 PROCEDURE — 97110 THERAPEUTIC EXERCISES: CPT | Performed by: PHYSICAL THERAPIST

## 2024-06-17 NOTE — PROGRESS NOTES
[facility] ORTHOPAEDICS  [address] [place] [address]  Dept: [phone]      Physical Therapy Daily Note     Referring MD: Andres Giang MD  Diagnosis:     ICD-10-CM    1. Chronic bilateral low back pain with bilateral sciatica  M54.42     M54.41     G89.29       2. Decreased ROM of trunk and back  M25.69       3. Decreased strength of trunk and back  R29.898       4. Decreased activity tolerance  R68.89         Therapy precautions:None  Co-morbidities affecting plan of care: PTSD; IDDM; CAD.    Payor: Payor: [employer]ICARE /  /  /  Billing pattern: Government- total time     Total Timed Procedure Codes: 30 min, Total Time: 40 min Modifier needed: No  Episode visit count:  5     PERTINENT MEDICAL HISTORY     Past medical and surgical history:   Past Medical History:   Diagnosis Date    Abdominal pain 01/22/2015    Abrasion of hip 07/28/2015    Bronchitis 01/07/2016    Chronic back pain     Depression     Diabetes (HCC)     Type 2 on oral agents    DVT (deep venous thrombosis) (HCC)     Endocrine disease     Hydrocele     Hyperlipidemia     Joint pain     upper arm    Local skin infection 07/28/2015    Lumbago 11/17/2014    Lumbar degenerative disc disease 11/17/2014    Malaise and fatigue 09/03/2014    Obesity     Other forms of angina pectoris (HCC) 09/03/2014    Psoriasis     PTSD (post-traumatic stress disorder)     Tachycardia 09/03/2014    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      Past Surgical History:   Procedure Laterality Date    APPENDECTOMY  01/2019    Appendectomy     CARDIAC PROCEDURE N/A 3/1/2024    Left heart cath / coronary angiography performed by Scott Shah MD at [facility] CARDIAC CATH LAB    HERNIA REPAIR Right 05/2019    Right inguinal hernia repair with mesh - no records- in [place]    IR TRANSLUMINAL BALLOON PTA 1ST VEIN  2018    angiogram of the right - [place]    LUMBAR FUSION      L4-L5 fusion     PRE-MALIGNANT / BENIGN SKIN LESION EXCISION      excision of spider bite

## 2024-06-20 ENCOUNTER — HOSPITAL ENCOUNTER (EMERGENCY)
Age: 49
Discharge: HOME | End: 2024-06-21
Payer: MEDICARE

## 2024-06-20 ENCOUNTER — APPOINTMENT (OUTPATIENT)
Dept: CT IMAGING | Age: 49
End: 2024-06-20
Payer: MEDICARE

## 2024-06-20 DIAGNOSIS — E11.65 HYPERGLYCEMIA DUE TO DIABETES MELLITUS (HCC): ICD-10-CM

## 2024-06-20 DIAGNOSIS — R10.31 RIGHT LOWER QUADRANT ABDOMINAL PAIN: Primary | ICD-10-CM

## 2024-06-20 LAB
ALBUMIN SERPL-MCNC: 3.6 G/DL (ref 3.5–5)
ALBUMIN/GLOB SERPL: 1 (ref 1–1.9)
ALP SERPL-CCNC: 141 U/L (ref 40–129)
ALT SERPL-CCNC: 28 U/L (ref 12–65)
ANION GAP SERPL CALC-SCNC: 15 MMOL/L (ref 9–18)
AST SERPL-CCNC: 21 U/L (ref 15–37)
BASOPHILS # BLD: 0 K/UL (ref 0–0.2)
BASOPHILS NFR BLD: 0 % (ref 0–2)
BILIRUB SERPL-MCNC: 0.5 MG/DL (ref 0–1.2)
BILIRUB UR QL: NEGATIVE
BUN SERPL-MCNC: 14 MG/DL (ref 6–23)
CALCIUM SERPL-MCNC: 9.4 MG/DL (ref 8.8–10.2)
CHLORIDE SERPL-SCNC: 95 MMOL/L (ref 98–107)
CO2 SERPL-SCNC: 23 MMOL/L (ref 20–28)
CREAT SERPL-MCNC: 0.81 MG/DL (ref 0.8–1.3)
DIFFERENTIAL METHOD BLD: NORMAL
EOSINOPHIL # BLD: 0.2 K/UL (ref 0–0.8)
EOSINOPHIL NFR BLD: 2 % (ref 0.5–7.8)
ERYTHROCYTE [DISTWIDTH] IN BLOOD BY AUTOMATED COUNT: 12.7 % (ref 11.9–14.6)
GLOBULIN SER CALC-MCNC: 3.5 G/DL (ref 2.3–3.5)
GLUCOSE BLD STRIP.AUTO-MCNC: 423 MG/DL (ref 65–100)
GLUCOSE SERPL-MCNC: 499 MG/DL (ref 70–99)
GLUCOSE UR QL STRIP.AUTO: 500 MG/DL
HCT VFR BLD AUTO: 45.5 % (ref 41.1–50.3)
HGB BLD-MCNC: 15.8 G/DL (ref 13.6–17.2)
IMM GRANULOCYTES # BLD AUTO: 0.1 K/UL (ref 0–0.5)
IMM GRANULOCYTES NFR BLD AUTO: 1 % (ref 0–5)
KETONES UR-MCNC: NEGATIVE MG/DL
LEUKOCYTE ESTERASE UR QL STRIP: NEGATIVE
LIPASE SERPL-CCNC: 91 U/L (ref 13–60)
LYMPHOCYTES # BLD: 2.3 K/UL (ref 0.5–4.6)
LYMPHOCYTES NFR BLD: 26 % (ref 13–44)
MCH RBC QN AUTO: 29.5 PG (ref 26.1–32.9)
MCHC RBC AUTO-ENTMCNC: 34.7 G/DL (ref 31.4–35)
MCV RBC AUTO: 85 FL (ref 82–102)
MONOCYTES # BLD: 0.5 K/UL (ref 0.1–1.3)
MONOCYTES NFR BLD: 6 % (ref 4–12)
NEUTS SEG # BLD: 5.8 K/UL (ref 1.7–8.2)
NEUTS SEG NFR BLD: 66 % (ref 43–78)
NITRITE UR QL: NEGATIVE
NRBC # BLD: 0 K/UL (ref 0–0.2)
PH UR: 5.5 (ref 5–9)
PLATELET # BLD AUTO: 206 K/UL (ref 150–450)
PMV BLD AUTO: 10.3 FL (ref 9.4–12.3)
POTASSIUM SERPL-SCNC: 4.2 MMOL/L (ref 3.5–5.1)
PROT SERPL-MCNC: 7.1 G/DL (ref 6.3–8.2)
PROT UR QL: NEGATIVE MG/DL
RBC # BLD AUTO: 5.35 M/UL (ref 4.23–5.6)
RBC # UR STRIP: NEGATIVE
SERVICE CMNT-IMP: ABNORMAL
SERVICE CMNT-IMP: ABNORMAL
SODIUM SERPL-SCNC: 133 MMOL/L (ref 136–145)
SP GR UR: 1.01 (ref 1–1.02)
UROBILINOGEN UR QL: 0.2 EU/DL (ref 0.2–1)
WBC # BLD AUTO: 8.8 K/UL (ref 4.3–11.1)

## 2024-06-20 PROCEDURE — 6360000004 HC RX CONTRAST MEDICATION: Performed by: NURSE PRACTITIONER

## 2024-06-20 PROCEDURE — 83690 ASSAY OF LIPASE: CPT

## 2024-06-20 PROCEDURE — 96361 HYDRATE IV INFUSION ADD-ON: CPT

## 2024-06-20 PROCEDURE — 74177 CT ABD & PELVIS W/CONTRAST: CPT

## 2024-06-20 PROCEDURE — 80053 COMPREHEN METABOLIC PANEL: CPT

## 2024-06-20 PROCEDURE — 96375 TX/PRO/DX INJ NEW DRUG ADDON: CPT

## 2024-06-20 PROCEDURE — 99285 EMERGENCY DEPT VISIT HI MDM: CPT

## 2024-06-20 PROCEDURE — 6360000002 HC RX W HCPCS: Performed by: NURSE PRACTITIONER

## 2024-06-20 PROCEDURE — 82962 GLUCOSE BLOOD TEST: CPT

## 2024-06-20 PROCEDURE — 85025 COMPLETE CBC W/AUTO DIFF WBC: CPT

## 2024-06-20 PROCEDURE — 6370000000 HC RX 637 (ALT 250 FOR IP): Performed by: NURSE PRACTITIONER

## 2024-06-20 PROCEDURE — 96374 THER/PROPH/DIAG INJ IV PUSH: CPT

## 2024-06-20 PROCEDURE — 81003 URINALYSIS AUTO W/O SCOPE: CPT

## 2024-06-20 PROCEDURE — 2580000003 HC RX 258: Performed by: NURSE PRACTITIONER

## 2024-06-20 RX ORDER — 0.9 % SODIUM CHLORIDE 0.9 %
1000 INTRAVENOUS SOLUTION INTRAVENOUS
Status: COMPLETED | OUTPATIENT
Start: 2024-06-20 | End: 2024-06-21

## 2024-06-20 RX ORDER — PROMETHAZINE HYDROCHLORIDE 25 MG/1
25 TABLET ORAL ONCE
Status: COMPLETED | OUTPATIENT
Start: 2024-06-20 | End: 2024-06-20

## 2024-06-20 RX ORDER — ONDANSETRON 2 MG/ML
4 INJECTION INTRAMUSCULAR; INTRAVENOUS
Status: COMPLETED | OUTPATIENT
Start: 2024-06-20 | End: 2024-06-20

## 2024-06-20 RX ORDER — KETOROLAC TROMETHAMINE 15 MG/ML
15 INJECTION, SOLUTION INTRAMUSCULAR; INTRAVENOUS ONCE
Status: COMPLETED | OUTPATIENT
Start: 2024-06-20 | End: 2024-06-20

## 2024-06-20 RX ORDER — PROMETHAZINE HYDROCHLORIDE 25 MG/1
25 TABLET ORAL 4 TIMES DAILY PRN
Qty: 20 TABLET | Refills: 0 | Status: SHIPPED | OUTPATIENT
Start: 2024-06-20 | End: 2024-06-27

## 2024-06-20 RX ADMIN — SODIUM CHLORIDE 1000 ML: 9 INJECTION, SOLUTION INTRAVENOUS at 23:38

## 2024-06-20 RX ADMIN — KETOROLAC TROMETHAMINE 15 MG: 15 INJECTION, SOLUTION INTRAMUSCULAR; INTRAVENOUS at 22:42

## 2024-06-20 RX ADMIN — PROMETHAZINE HYDROCHLORIDE 25 MG: 25 TABLET ORAL at 23:55

## 2024-06-20 RX ADMIN — INSULIN HUMAN 5 UNITS: 100 INJECTION, SOLUTION PARENTERAL at 23:34

## 2024-06-20 RX ADMIN — IOPAMIDOL 100 ML: 755 INJECTION, SOLUTION INTRAVENOUS at 22:54

## 2024-06-20 RX ADMIN — ONDANSETRON 4 MG: 2 INJECTION INTRAMUSCULAR; INTRAVENOUS at 22:40

## 2024-06-20 ASSESSMENT — PAIN SCALES - GENERAL
PAINLEVEL_OUTOF10: 9
PAINLEVEL_OUTOF10: 7

## 2024-06-20 ASSESSMENT — PAIN DESCRIPTION - ORIENTATION: ORIENTATION: RIGHT

## 2024-06-20 ASSESSMENT — PAIN - FUNCTIONAL ASSESSMENT: PAIN_FUNCTIONAL_ASSESSMENT: 0-10

## 2024-06-21 VITALS
DIASTOLIC BLOOD PRESSURE: 68 MMHG | BODY MASS INDEX: 35.78 KG/M2 | TEMPERATURE: 98.9 F | RESPIRATION RATE: 18 BRPM | OXYGEN SATURATION: 98 % | SYSTOLIC BLOOD PRESSURE: 115 MMHG | HEART RATE: 75 BPM | WEIGHT: 270 LBS | HEIGHT: 73 IN

## 2024-06-21 LAB
GLUCOSE BLD STRIP.AUTO-MCNC: 412 MG/DL (ref 65–100)
SERVICE CMNT-IMP: ABNORMAL

## 2024-06-21 PROCEDURE — 82962 GLUCOSE BLOOD TEST: CPT

## 2024-06-21 ASSESSMENT — ENCOUNTER SYMPTOMS
SHORTNESS OF BREATH: 0
NAUSEA: 1
DIARRHEA: 0
BLOOD IN STOOL: 0
ABDOMINAL PAIN: 1
CONSTIPATION: 0
VOMITING: 0

## 2024-06-21 ASSESSMENT — PAIN SCALES - GENERAL: PAINLEVEL_OUTOF10: 4

## 2024-06-21 NOTE — ED TRIAGE NOTES
Pt arrives ambulatory to ED with complaints of abdominal pain starting on right side and going g down to groin area. Pain since yesterday \"off and on\" but worse today. Pt states he is in a recovery program and can not take narcotics. Pt states he has nausea but no emesis.

## 2024-06-21 NOTE — ED NOTES
Pt states he can get ride from cab/friend home . Pt educated that phenergan can cause drowsiness. Pt verbalized understanding.

## 2024-06-21 NOTE — DISCHARGE INSTRUCTIONS
Your CT scan is normal.  Other than your glucose being elevated, your labs are normal.  Continue taking your medications as prescribed by your primary care doctor.  Follow a diabetic diet.  Take nausea medicine as prescribed if needed.  Please follow-up closely with your primary care provider.  Take Tylenol or Motrin if needed for pain.  Return to the emergency department for any new, worsening, or concerning symptoms.

## 2024-06-21 NOTE — ED PROVIDER NOTES
METFORMIN (GLUCOPHAGE) 1000 MG TABLET    Take 1,000 mg by mouth 2 times daily    NITROGLYCERIN (NITROLINGUAL) 0.4 MG/SPRAY 0.4 MG SPRAY    Place 1 spray under the tongue    TIZANIDINE (ZANAFLEX) 2 MG TABLET    Take 1 tablet by mouth 3 times daily as needed (prn)    TRAZODONE (DESYREL) 150 MG TABLET    Take 1 tablet by mouth nightly        Results for orders placed or performed during the hospital encounter of 06/20/24   CT ABDOMEN PELVIS W IV CONTRAST Additional Contrast? None    Narrative    Clinical History/Indication for Exam:  abd pain : abd pain    CT ABDOMEN AND PELVIS WITH INTRAVENOUS CONTRAST    INDICATION:  abd pain : abd pain    TECHNIQUE:  Axial computed tomography images of the abdomen and pelvis  with intravenous contrast.  Sagittal and coronal reformatted images  were created and reviewed.  This CT exam was performed using one or  more of the following dose reduction techniques:  automated exposure  control, adjustment of the mA and/or kV according to patient size,  and/or use of iterative reconstruction technique.    COMPARISON:  CT abdomen/pelvis 12/28/2023.    FINDINGS:    Lung bases:  Unremarkable.  No mass.  No consolidation.    ABDOMEN:    Liver:  Liver measures 19 cm with steatosis.    Gallbladder and bile ducts:  Unremarkable.  No calcified stones.  No  ductal dilation.    Pancreas:  Unremarkable.  No mass.  No ductal dilation.    Spleen:  Unremarkable.  No splenomegaly.    Adrenals:  Unremarkable.  No mass.    Kidneys and ureters:  Unremarkable.  No solid mass.  No  hydronephrosis.    Stomach and bowel:  Rectal wall thickening.  No obstruction.    PELVIS:    Appendix:  No findings to suggest acute appendicitis.    Bladder:  Unremarkable.  No mass.    Reproductive:  Unremarkable as visualized.    ABDOMEN and PELVIS:    Intraperitoneal space:  Unremarkable.  No free air.  No significant  fluid collection.    Bones/joints:  L4-S1 surgical fusion.  No acute fracture.  No  dislocation.    Soft

## 2024-06-21 NOTE — ED NOTES
Patient mobility status  with no difficulty. Provider aware     I have reviewed discharge instructions with the patient.  The patient verbalized understanding.    Patient left ED via Discharge Method: ambulatory to Home with Extended Family:.    Opportunity for questions and clarification provided.     Patient given 1 scripts.

## 2024-06-23 SDOH — HEALTH STABILITY: PHYSICAL HEALTH: ON AVERAGE, HOW MANY MINUTES DO YOU ENGAGE IN EXERCISE AT THIS LEVEL?: 10 MIN

## 2024-06-23 SDOH — HEALTH STABILITY: PHYSICAL HEALTH: ON AVERAGE, HOW MANY DAYS PER WEEK DO YOU ENGAGE IN MODERATE TO STRENUOUS EXERCISE (LIKE A BRISK WALK)?: 2 DAYS

## 2024-06-24 ENCOUNTER — TREATMENT (OUTPATIENT)
Age: 49
End: 2024-06-24
Payer: MEDICARE

## 2024-06-24 DIAGNOSIS — G89.29 CHRONIC BILATERAL LOW BACK PAIN WITH BILATERAL SCIATICA: Primary | ICD-10-CM

## 2024-06-24 DIAGNOSIS — M54.41 CHRONIC BILATERAL LOW BACK PAIN WITH BILATERAL SCIATICA: Primary | ICD-10-CM

## 2024-06-24 DIAGNOSIS — M54.42 CHRONIC BILATERAL LOW BACK PAIN WITH BILATERAL SCIATICA: Primary | ICD-10-CM

## 2024-06-24 DIAGNOSIS — R29.898 DECREASED STRENGTH OF TRUNK AND BACK: ICD-10-CM

## 2024-06-24 DIAGNOSIS — M25.69 DECREASED ROM OF TRUNK AND BACK: ICD-10-CM

## 2024-06-24 DIAGNOSIS — R68.89 DECREASED ACTIVITY TOLERANCE: ICD-10-CM

## 2024-06-24 PROCEDURE — 97110 THERAPEUTIC EXERCISES: CPT | Performed by: PHYSICAL THERAPIST

## 2024-06-24 NOTE — PROGRESS NOTES
[facility]  [address] [place] [address]  Dept: [phone]      Physical Therapy Daily Note     Referring MD: Andres Giang MD  Diagnosis:     ICD-10-CM    1. Chronic bilateral low back pain with bilateral sciatica  M54.42     M54.41     G89.29       2. Decreased activity tolerance  R68.89       3. Decreased ROM of trunk and back  M25.69       4. Decreased strength of trunk and back  R29.898         Therapy precautions:None  Co-morbidities affecting plan of care: PTSD; IDDM; CAD.    Payor: Payor: [employer] MEDICARE /  /  /  Billing pattern: Government- total time     Total Timed Procedure Codes: 40 min, Total Time: 40 min Modifier needed: No  Episode visit count:  6     PERTINENT MEDICAL HISTORY     Past medical and surgical history:   Past Medical History:   Diagnosis Date    Abdominal pain 01/22/2015    Abrasion of hip 07/28/2015    Bronchitis 01/07/2016    Chronic back pain     Depression     Diabetes (HCC)     Type 2 on oral agents    DVT (deep venous thrombosis) (HCC)     Endocrine disease     Hydrocele     Hyperlipidemia     Joint pain     upper arm    Local skin infection 07/28/2015    Lumbago 11/17/2014    Lumbar degenerative disc disease 11/17/2014    Malaise and fatigue 09/03/2014    Obesity     Other forms of angina pectoris (HCC) 09/03/2014    Psoriasis     PTSD (post-traumatic stress disorder)     Tachycardia 09/03/2014    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      Past Surgical History:   Procedure Laterality Date    APPENDECTOMY  01/2019    Appendectomy     CARDIAC PROCEDURE N/A 3/1/2024    Left heart cath / coronary angiography performed by Scott Shah MD at  CARDIAC CATH LAB    HERNIA REPAIR Right 05/2019    Right inguinal hernia repair with mesh - no records- in [place]    IR TRANSLUMINAL BALLOON PTA 1ST VEIN  2018    angiogram of the right - [place]    LUMBAR FUSION      L4-L5 fusion     PRE-MALIGNANT / BENIGN SKIN LESION EXCISION      excision of spider bite

## 2024-06-25 PROBLEM — E11.65 TYPE 2 DIABETES MELLITUS WITH HYPERGLYCEMIA, WITH LONG-TERM CURRENT USE OF INSULIN (HCC): Status: ACTIVE | Noted: 2024-03-01

## 2024-06-25 PROBLEM — R10.31 INGUINODYNIA, RIGHT: Status: RESOLVED | Noted: 2020-01-13 | Resolved: 2024-06-25

## 2024-06-25 PROBLEM — E66.01 MORBID OBESITY (HCC): Chronic | Status: ACTIVE | Noted: 2021-02-01

## 2024-06-25 PROBLEM — I10 ESSENTIAL HYPERTENSION: Status: ACTIVE | Noted: 2021-01-30

## 2024-06-25 PROBLEM — Z79.4 TYPE 2 DIABETES MELLITUS WITHOUT COMPLICATION, WITH LONG-TERM CURRENT USE OF INSULIN (HCC): Status: ACTIVE | Noted: 2024-03-01

## 2024-06-25 PROBLEM — E78.5 HLD (HYPERLIPIDEMIA): Status: RESOLVED | Noted: 2024-03-01 | Resolved: 2024-06-25

## 2024-06-25 PROBLEM — F17.200 TOBACCO DEPENDENCY: Status: ACTIVE | Noted: 2021-01-30

## 2024-06-25 PROBLEM — E78.5 DYSLIPIDEMIA: Status: ACTIVE | Noted: 2021-01-30

## 2024-06-26 ENCOUNTER — OFFICE VISIT (OUTPATIENT)
Dept: FAMILY MEDICINE CLINIC | Facility: CLINIC | Age: 49
End: 2024-06-26
Payer: MEDICARE

## 2024-06-26 VITALS
DIASTOLIC BLOOD PRESSURE: 78 MMHG | OXYGEN SATURATION: 94 % | HEIGHT: 73 IN | WEIGHT: 246.6 LBS | HEART RATE: 83 BPM | RESPIRATION RATE: 14 BRPM | BODY MASS INDEX: 32.68 KG/M2 | TEMPERATURE: 98.7 F | SYSTOLIC BLOOD PRESSURE: 110 MMHG

## 2024-06-26 DIAGNOSIS — F34.1 PERSISTENT DEPRESSIVE DISORDER: ICD-10-CM

## 2024-06-26 DIAGNOSIS — L40.9 PSORIASIS: ICD-10-CM

## 2024-06-26 DIAGNOSIS — M48.062 SPINAL STENOSIS OF LUMBAR REGION WITH NEUROGENIC CLAUDICATION: ICD-10-CM

## 2024-06-26 DIAGNOSIS — Z09 HOSPITAL DISCHARGE FOLLOW-UP: ICD-10-CM

## 2024-06-26 DIAGNOSIS — Z91.030 ALLERGY TO BEE STING: ICD-10-CM

## 2024-06-26 DIAGNOSIS — E78.5 DYSLIPIDEMIA: Primary | ICD-10-CM

## 2024-06-26 DIAGNOSIS — F51.01 PRIMARY INSOMNIA: ICD-10-CM

## 2024-06-26 DIAGNOSIS — Z79.4 TYPE 2 DIABETES MELLITUS WITH HYPERGLYCEMIA, WITH LONG-TERM CURRENT USE OF INSULIN (HCC): ICD-10-CM

## 2024-06-26 DIAGNOSIS — E11.65 TYPE 2 DIABETES MELLITUS WITH HYPERGLYCEMIA, WITH LONG-TERM CURRENT USE OF INSULIN (HCC): ICD-10-CM

## 2024-06-26 DIAGNOSIS — Z98.1 STATUS POST LUMBAR SPINAL FUSION: ICD-10-CM

## 2024-06-26 PROBLEM — I10 ESSENTIAL HYPERTENSION: Status: RESOLVED | Noted: 2021-01-30 | Resolved: 2024-06-26

## 2024-06-26 PROBLEM — K76.0 FATTY LIVER: Status: ACTIVE | Noted: 2024-06-26

## 2024-06-26 PROBLEM — Z86.73 HISTORY OF TIA (TRANSIENT ISCHEMIC ATTACK): Status: ACTIVE | Noted: 2019-09-10

## 2024-06-26 PROCEDURE — 2022F DILAT RTA XM EVC RTNOPTHY: CPT | Performed by: PHYSICIAN ASSISTANT

## 2024-06-26 PROCEDURE — 4004F PT TOBACCO SCREEN RCVD TLK: CPT | Performed by: PHYSICIAN ASSISTANT

## 2024-06-26 PROCEDURE — G8417 CALC BMI ABV UP PARAM F/U: HCPCS | Performed by: PHYSICIAN ASSISTANT

## 2024-06-26 PROCEDURE — G8427 DOCREV CUR MEDS BY ELIG CLIN: HCPCS | Performed by: PHYSICIAN ASSISTANT

## 2024-06-26 PROCEDURE — 1111F DSCHRG MED/CURRENT MED MERGE: CPT | Performed by: PHYSICIAN ASSISTANT

## 2024-06-26 PROCEDURE — 99204 OFFICE O/P NEW MOD 45 MIN: CPT | Performed by: PHYSICIAN ASSISTANT

## 2024-06-26 PROCEDURE — 3046F HEMOGLOBIN A1C LEVEL >9.0%: CPT | Performed by: PHYSICIAN ASSISTANT

## 2024-06-26 RX ORDER — EPINEPHRINE 0.3 MG/.3ML
INJECTION SUBCUTANEOUS
Qty: 2 EACH | Refills: 1 | Status: SHIPPED | OUTPATIENT
Start: 2024-06-26

## 2024-06-26 RX ORDER — ATORVASTATIN CALCIUM 40 MG/1
40 TABLET, FILM COATED ORAL NIGHTLY
Qty: 90 TABLET | Refills: 0 | Status: SHIPPED | OUTPATIENT
Start: 2024-06-26

## 2024-06-26 RX ORDER — DESOXIMETASONE 0.5 MG/G
OINTMENT TOPICAL
Qty: 100 G | Refills: 0 | Status: SHIPPED | OUTPATIENT
Start: 2024-06-26

## 2024-06-26 RX ORDER — TRAZODONE HYDROCHLORIDE 150 MG/1
150 TABLET ORAL
Qty: 90 TABLET | Refills: 0 | Status: SHIPPED | OUTPATIENT
Start: 2024-06-26

## 2024-06-26 RX ORDER — INSULIN GLARGINE 100 [IU]/ML
30 INJECTION, SOLUTION SUBCUTANEOUS NIGHTLY
Qty: 3 ADJUSTABLE DOSE PRE-FILLED PEN SYRINGE | Refills: 2 | Status: SHIPPED | OUTPATIENT
Start: 2024-06-26

## 2024-06-26 SDOH — ECONOMIC STABILITY: HOUSING INSECURITY
IN THE LAST 12 MONTHS, WAS THERE A TIME WHEN YOU DID NOT HAVE A STEADY PLACE TO SLEEP OR SLEPT IN A SHELTER (INCLUDING NOW)?: PATIENT DECLINED

## 2024-06-26 SDOH — ECONOMIC STABILITY: INCOME INSECURITY: HOW HARD IS IT FOR YOU TO PAY FOR THE VERY BASICS LIKE FOOD, HOUSING, MEDICAL CARE, AND HEATING?: SOMEWHAT HARD

## 2024-06-26 SDOH — ECONOMIC STABILITY: FOOD INSECURITY: WITHIN THE PAST 12 MONTHS, THE FOOD YOU BOUGHT JUST DIDN'T LAST AND YOU DIDN'T HAVE MONEY TO GET MORE.: SOMETIMES TRUE

## 2024-06-26 SDOH — ECONOMIC STABILITY: FOOD INSECURITY: WITHIN THE PAST 12 MONTHS, YOU WORRIED THAT YOUR FOOD WOULD RUN OUT BEFORE YOU GOT MONEY TO BUY MORE.: SOMETIMES TRUE

## 2024-06-26 ASSESSMENT — PATIENT HEALTH QUESTIONNAIRE - PHQ9
SUM OF ALL RESPONSES TO PHQ9 QUESTIONS 1 & 2: 2
SUM OF ALL RESPONSES TO PHQ QUESTIONS 1-9: 2
1. LITTLE INTEREST OR PLEASURE IN DOING THINGS: SEVERAL DAYS
SUM OF ALL RESPONSES TO PHQ QUESTIONS 1-9: 2
2. FEELING DOWN, DEPRESSED OR HOPELESS: SEVERAL DAYS
SUM OF ALL RESPONSES TO PHQ QUESTIONS 1-9: 2
SUM OF ALL RESPONSES TO PHQ QUESTIONS 1-9: 2

## 2024-06-26 ASSESSMENT — ENCOUNTER SYMPTOMS
SORE THROAT: 0
CHEST TIGHTNESS: 0
ABDOMINAL PAIN: 0
WHEEZING: 0
ROS SKIN COMMENTS: PSORIASIS
EYE PAIN: 0
BACK PAIN: 1
SHORTNESS OF BREATH: 0

## 2024-06-26 NOTE — PATIENT INSTRUCTIONS
[place] Transportation Resources*  (Call 211/United Way if you need more resources.)    Medicaid Van: [employer]   They offer: Non-emergency medical transportation for Medicaid members and some Medicare Advantage plans  Contact: [phone]   Helpful Info: Must call for a ride at least 3 days before your appointment.  Call Monday- Friday 8:00am to 5:00pm. Transportation is available for MD appts, dialysis, x-rays, lab work, drug store, or other medical appointments.     [facility] Agency on Aging  What they offer: Provides assistance and medical transport to adults 60 years and older.  Contact: [phone]    [employer]   What they offer: Charge a fee for transport (not free).  Contact: [phone]    Transportation on Demand   They Offer: Charge a fee for transport (not free).  Contact: [phone]    Roundtrip  They Offer: non-emergency medical transportation (NEMT) that supports all levels of transport: rideshare (Lyft/Uber), Taxis, wheelchair vans when and where they are needed.  Contact: https://[employer]/                        [place] Housing Resources*  (Call United Way/211 if you need more resources.)    [place] [employer]  They Offer: Housing Choice Vouchers (Section 8) rental assistance available to persons with disabilities, families with children, and older adults whose household income is below 80% the median income for [facility].   Contact: [phone]; Website:www.ClickHome.WhoGotStuff    Department [place] Affairs Homeless - National Call Center   They offer: Free 24/7 access to trained counselors for local resources and assistance.  Contact: [phone] Website: www.[place].gov[place]homeless/nationalcallcenter.asp      Afforablehousing.com    https://[email or web address]/hsjciojljz-dgorpw-lc/    [place] Housing Search    [email or web address]    [employer] Connections:  They Offer: Homelessness Prevention, Affordable Housing, Outreach

## 2024-06-26 NOTE — PROGRESS NOTES
fever.   HENT:  Negative for congestion and sore throat.    Eyes:  Negative for pain and visual disturbance.   Respiratory:  Negative for chest tightness, shortness of breath and wheezing.    Cardiovascular:  Negative for chest pain and leg swelling.   Gastrointestinal:  Negative for abdominal pain.   Genitourinary: Negative.    Musculoskeletal:  Positive for back pain. Negative for arthralgias.   Skin: Negative.         psoriasis   Neurological:  Negative for dizziness, seizures, syncope and weakness.   Psychiatric/Behavioral:  Negative for dysphoric mood and suicidal ideas. The patient is not nervous/anxious.         Depression       Vitals:    06/26/24 1328   BP: 110/78   Pulse: 83   Resp: 14   Temp: 98.7 °F (37.1 °C)   TempSrc: Oral   SpO2: 94%   Weight: 111.9 kg (246 lb 9.6 oz)   Height: 1.854 m (6' 1\")          6/26/2024     1:40 PM   PHQ-9    Little interest or pleasure in doing things 1   Feeling down, depressed, or hopeless 1   PHQ-2 Score 2   PHQ-9 Total Score 2         PHYSICAL EXAM:     Physical Exam  Constitutional:       Appearance: Normal appearance. He is obese.   HENT:      Head: Normocephalic and atraumatic.      Mouth/Throat:      Mouth: Mucous membranes are moist.   Eyes:      Extraocular Movements: Extraocular movements intact.   Musculoskeletal:      Cervical back: Normal range of motion.   Skin:     General: Skin is warm and dry.      Comments: Erythrmatous plaques (Psoriasis) on right leg, bilat elbows and forearms and abdomen   Neurological:      General: No focal deficit present.      Mental Status: He is alert.   Psychiatric:         Mood and Affect: Mood normal.         Behavior: Behavior normal.         Thought Content: Thought content normal.         Judgment: Judgment normal.         DIAGNOSIS  Esteban was seen today for follow-up from hospital, diabetes and hyperlipidemia.    Diagnoses and all orders for this visit:    Dyslipidemia  -     atorvastatin (LIPITOR) 40 MG tablet; Take 1

## 2024-06-27 RX ORDER — TRIAMCINOLONE ACETONIDE 0.25 MG/G
OINTMENT TOPICAL
Refills: 0 | OUTPATIENT
Start: 2024-06-27

## 2024-07-01 ENCOUNTER — TREATMENT (OUTPATIENT)
Age: 49
End: 2024-07-01
Payer: MEDICARE

## 2024-07-01 DIAGNOSIS — M54.42 CHRONIC BILATERAL LOW BACK PAIN WITH BILATERAL SCIATICA: Primary | ICD-10-CM

## 2024-07-01 DIAGNOSIS — F41.9 ANXIETY: Primary | ICD-10-CM

## 2024-07-01 DIAGNOSIS — R68.89 DECREASED ACTIVITY TOLERANCE: ICD-10-CM

## 2024-07-01 DIAGNOSIS — G89.29 CHRONIC BILATERAL LOW BACK PAIN WITH BILATERAL SCIATICA: Primary | ICD-10-CM

## 2024-07-01 DIAGNOSIS — M25.69 DECREASED ROM OF TRUNK AND BACK: ICD-10-CM

## 2024-07-01 DIAGNOSIS — M54.41 CHRONIC BILATERAL LOW BACK PAIN WITH BILATERAL SCIATICA: Primary | ICD-10-CM

## 2024-07-01 DIAGNOSIS — R29.898 DECREASED STRENGTH OF TRUNK AND BACK: ICD-10-CM

## 2024-07-01 PROCEDURE — 97140 MANUAL THERAPY 1/> REGIONS: CPT | Performed by: PHYSICAL THERAPIST

## 2024-07-01 PROCEDURE — 97110 THERAPEUTIC EXERCISES: CPT | Performed by: PHYSICAL THERAPIST

## 2024-07-01 RX ORDER — CITALOPRAM 20 MG/1
20 TABLET ORAL DAILY
Qty: 30 TABLET | Refills: 1 | Status: SHIPPED | OUTPATIENT
Start: 2024-07-01

## 2024-07-01 NOTE — PROGRESS NOTES
7/21/24: Started patient on 20 mg of citalopram daily for anxiety, after review of [facility] report.

## 2024-07-01 NOTE — PROGRESS NOTES
[facility]  [address] [place] [address]  Dept: [phone]      Physical Therapy Daily Note     Referring MD: Andres Giang MD  Diagnosis:     ICD-10-CM    1. Chronic bilateral low back pain with bilateral sciatica  M54.42     M54.41     G89.29       2. Decreased activity tolerance  R68.89       3. Decreased ROM of trunk and back  M25.69       4. Decreased strength of trunk and back  R29.898         Therapy precautions:None  Co-morbidities affecting plan of care: PTSD; IDDM; CAD.    Payor: Payor: HU[employer] MEDICARE /  /  /  Billing pattern: Government- total time     Total Timed Procedure Codes: 40 min, Total Time: 40 min Modifier needed: No  Episode visit count:  7     PERTINENT MEDICAL HISTORY     Past medical and surgical history:   Past Medical History:   Diagnosis Date    Chronic back pain     Depression     Diabetes (HCC)     Type 2 on oral agents    DVT (deep venous thrombosis) (HCC)     Erectile dysfunction     Hydrocele     Hyperlipidemia     Hypertension     Lumbago 11/17/2014    Lumbar degenerative disc disease 11/17/2014    Obesity     Other forms of angina pectoris (HCC) 09/03/2014    Psoriasis     PTSD (post-traumatic stress disorder)     Tachycardia 09/03/2014    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      Past Surgical History:   Procedure Laterality Date    APPENDECTOMY  01/2019    Appendectomy     CARDIAC PROCEDURE N/A 03/01/2024    Left heart cath / coronary angiography performed by Scott Shah MD at [facility] CARDIAC CATH LAB    HERNIA REPAIR Right 05/2019    Right inguinal hernia repair with mesh - no records- in [place]    HYDROCELE EXCISION  1980    IR TRANSLUMINAL BALLOON PTA 1ST VEIN  2018    angiogram of the right - [place]    LUMBAR FUSION  2007    L4-L5 fusion     PRE-MALIGNANT / BENIGN SKIN LESION EXCISION  2004    brown recluse bite excision     Medications: reviewed in chart   Allergies:   Allergies   Allergen Reactions    Amitriptyline Other (See Comments)

## 2024-07-24 ENCOUNTER — APPOINTMENT (OUTPATIENT)
Dept: GENERAL RADIOLOGY | Age: 49
End: 2024-07-24
Payer: MEDICARE

## 2024-07-24 ENCOUNTER — HOSPITAL ENCOUNTER (EMERGENCY)
Age: 49
Discharge: HOME | End: 2024-07-24
Attending: EMERGENCY MEDICINE
Payer: MEDICARE

## 2024-07-24 VITALS
WEIGHT: 250 LBS | OXYGEN SATURATION: 96 % | HEART RATE: 93 BPM | HEIGHT: 72 IN | RESPIRATION RATE: 18 BRPM | DIASTOLIC BLOOD PRESSURE: 79 MMHG | BODY MASS INDEX: 33.86 KG/M2 | SYSTOLIC BLOOD PRESSURE: 116 MMHG | TEMPERATURE: 98.8 F

## 2024-07-24 DIAGNOSIS — S39.012A STRAIN OF LUMBAR REGION, INITIAL ENCOUNTER: ICD-10-CM

## 2024-07-24 DIAGNOSIS — G89.29 ACUTE EXACERBATION OF CHRONIC LOW BACK PAIN: Primary | ICD-10-CM

## 2024-07-24 DIAGNOSIS — M54.50 ACUTE EXACERBATION OF CHRONIC LOW BACK PAIN: Primary | ICD-10-CM

## 2024-07-24 PROCEDURE — 72100 X-RAY EXAM L-S SPINE 2/3 VWS: CPT

## 2024-07-24 PROCEDURE — 6370000000 HC RX 637 (ALT 250 FOR IP): Performed by: EMERGENCY MEDICINE

## 2024-07-24 PROCEDURE — 99283 EMERGENCY DEPT VISIT LOW MDM: CPT

## 2024-07-24 RX ORDER — CYCLOBENZAPRINE HCL 10 MG
10 TABLET ORAL 3 TIMES DAILY PRN
Qty: 13 TABLET | Refills: 0 | Status: SHIPPED | OUTPATIENT
Start: 2024-07-24 | End: 2024-08-03

## 2024-07-24 RX ORDER — NAPROXEN SODIUM 550 MG/1
550 TABLET ORAL 2 TIMES DAILY WITH MEALS
Qty: 30 TABLET | Refills: 0 | Status: SHIPPED | OUTPATIENT
Start: 2024-07-24

## 2024-07-24 RX ORDER — DIAZEPAM 5 MG/1
5 TABLET ORAL ONCE
Status: COMPLETED | OUTPATIENT
Start: 2024-07-24 | End: 2024-07-24

## 2024-07-24 RX ORDER — OXYCODONE HYDROCHLORIDE 5 MG/1
5 TABLET ORAL
Status: DISCONTINUED | OUTPATIENT
Start: 2024-07-24 | End: 2024-07-24

## 2024-07-24 RX ADMIN — DIAZEPAM 5 MG: 5 TABLET ORAL at 22:18

## 2024-07-24 ASSESSMENT — PAIN SCALES - GENERAL
PAINLEVEL_OUTOF10: 6
PAINLEVEL_OUTOF10: 8

## 2024-07-24 ASSESSMENT — ENCOUNTER SYMPTOMS
VOICE CHANGE: 0
BACK PAIN: 1
WHEEZING: 0
VOMITING: 0
PHOTOPHOBIA: 0
APNEA: 0
STRIDOR: 0
EYE REDNESS: 0
COLOR CHANGE: 0
CHEST TIGHTNESS: 0
NAUSEA: 0

## 2024-07-24 ASSESSMENT — PAIN DESCRIPTION - LOCATION: LOCATION: BACK

## 2024-07-24 ASSESSMENT — PAIN - FUNCTIONAL ASSESSMENT: PAIN_FUNCTIONAL_ASSESSMENT: 0-10

## 2024-07-25 ENCOUNTER — OFFICE VISIT (OUTPATIENT)
Dept: FAMILY MEDICINE CLINIC | Facility: CLINIC | Age: 49
End: 2024-07-25
Payer: MEDICARE

## 2024-07-25 VITALS
OXYGEN SATURATION: 98 % | DIASTOLIC BLOOD PRESSURE: 70 MMHG | WEIGHT: 242.38 LBS | TEMPERATURE: 97.9 F | HEART RATE: 78 BPM | BODY MASS INDEX: 32.12 KG/M2 | SYSTOLIC BLOOD PRESSURE: 110 MMHG | HEIGHT: 73 IN

## 2024-07-25 DIAGNOSIS — F41.9 ANXIETY: ICD-10-CM

## 2024-07-25 DIAGNOSIS — M48.062 SPINAL STENOSIS OF LUMBAR REGION WITH NEUROGENIC CLAUDICATION: ICD-10-CM

## 2024-07-25 DIAGNOSIS — L02.214 ABSCESS OF GROIN, LEFT: Primary | ICD-10-CM

## 2024-07-25 PROCEDURE — G8427 DOCREV CUR MEDS BY ELIG CLIN: HCPCS

## 2024-07-25 PROCEDURE — G8417 CALC BMI ABV UP PARAM F/U: HCPCS

## 2024-07-25 PROCEDURE — 99214 OFFICE O/P EST MOD 30 MIN: CPT

## 2024-07-25 PROCEDURE — 4004F PT TOBACCO SCREEN RCVD TLK: CPT

## 2024-07-25 RX ORDER — SULFAMETHOXAZOLE AND TRIMETHOPRIM 800; 160 MG/1; MG/1
1 TABLET ORAL 2 TIMES DAILY
Qty: 14 TABLET | Refills: 0 | Status: SHIPPED | OUTPATIENT
Start: 2024-07-25 | End: 2024-08-01

## 2024-07-25 RX ORDER — LIDOCAINE 4 G/G
1 PATCH TOPICAL DAILY
Qty: 30 PATCH | Refills: 0 | Status: SHIPPED | OUTPATIENT
Start: 2024-07-25 | End: 2024-08-24
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
## 2024-07-25 NOTE — PATIENT INSTRUCTIONS
[facility]:     [phone]    For the abscess:     --Start the Bactrim which is the antibiotic.   --Would recommend warm compresses or warm baths with Epsom salts.

## 2024-07-25 NOTE — DISCHARGE INSTRUCTIONS
Your x-rays performed here show no acute fracture or hardware displacement  Take medications as prescribed  Follow-up with your primary care physician  Return to the ER for any new, worsening or life-threatening symptoms    It has been my pleasure to care for you here in the ER  I hope we have addressed your concerns here today and offered you some answers  If you receive a survey concerning your care here, I hope you feel comfortable with giving your care team positive scores

## 2024-07-25 NOTE — ED NOTES
Patient mobility status  with no difficulty. Provider aware     I have reviewed discharge instructions with the patient.  The patient verbalized understanding.    Patient left ED via Discharge Method: ambulatory to Home     Opportunity for questions and clarification provided.     Patient given 2 scripts.
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
## 2024-07-26 ENCOUNTER — APPOINTMENT (OUTPATIENT)
Dept: CT IMAGING | Age: 49
End: 2024-07-26
Payer: MEDICARE

## 2024-07-26 ENCOUNTER — HOSPITAL ENCOUNTER (EMERGENCY)
Age: 49
Discharge: HOME | End: 2024-07-27
Attending: EMERGENCY MEDICINE
Payer: MEDICARE

## 2024-07-26 DIAGNOSIS — N49.2 SCROTAL ABSCESS: Primary | ICD-10-CM

## 2024-07-26 LAB
ALBUMIN SERPL-MCNC: 3.9 G/DL (ref 3.5–5)
ALBUMIN/GLOB SERPL: 1.1 (ref 1–1.9)
ALP SERPL-CCNC: 126 U/L (ref 40–129)
ALT SERPL-CCNC: 19 U/L (ref 12–65)
ANION GAP SERPL CALC-SCNC: 16 MMOL/L (ref 9–18)
APPEARANCE UR: CLEAR
AST SERPL-CCNC: 16 U/L (ref 15–37)
BASOPHILS # BLD: 0.1 K/UL (ref 0–0.2)
BASOPHILS NFR BLD: 0 % (ref 0–2)
BILIRUB SERPL-MCNC: 0.5 MG/DL (ref 0–1.2)
BILIRUB UR QL: NEGATIVE
BUN SERPL-MCNC: 13 MG/DL (ref 6–23)
CALCIUM SERPL-MCNC: 9.8 MG/DL (ref 8.8–10.2)
CHLORIDE SERPL-SCNC: 97 MMOL/L (ref 98–107)
CO2 SERPL-SCNC: 24 MMOL/L (ref 20–28)
COLOR UR: ABNORMAL
CREAT SERPL-MCNC: 0.77 MG/DL (ref 0.8–1.3)
DIFFERENTIAL METHOD BLD: ABNORMAL
EOSINOPHIL # BLD: 0.2 K/UL (ref 0–0.8)
EOSINOPHIL NFR BLD: 2 % (ref 0.5–7.8)
ERYTHROCYTE [DISTWIDTH] IN BLOOD BY AUTOMATED COUNT: 12.5 % (ref 11.9–14.6)
GLOBULIN SER CALC-MCNC: 3.6 G/DL (ref 2.3–3.5)
GLUCOSE BLD STRIP.AUTO-MCNC: 273 MG/DL (ref 65–100)
GLUCOSE SERPL-MCNC: 266 MG/DL (ref 70–99)
GLUCOSE UR STRIP.AUTO-MCNC: >1000 MG/DL
HCT VFR BLD AUTO: 46 % (ref 41.1–50.3)
HGB BLD-MCNC: 16 G/DL (ref 13.6–17.2)
HGB UR QL STRIP: NEGATIVE
IMM GRANULOCYTES # BLD AUTO: 0.1 K/UL (ref 0–0.5)
IMM GRANULOCYTES NFR BLD AUTO: 1 % (ref 0–5)
KETONES UR QL STRIP.AUTO: ABNORMAL MG/DL
LACTATE SERPL-SCNC: 1.5 MMOL/L (ref 0.5–2)
LEUKOCYTE ESTERASE UR QL STRIP.AUTO: NEGATIVE
LYMPHOCYTES # BLD: 2.7 K/UL (ref 0.5–4.6)
LYMPHOCYTES NFR BLD: 24 % (ref 13–44)
MCH RBC QN AUTO: 29.6 PG (ref 26.1–32.9)
MCHC RBC AUTO-ENTMCNC: 34.8 G/DL (ref 31.4–35)
MCV RBC AUTO: 85 FL (ref 82–102)
MONOCYTES # BLD: 0.6 K/UL (ref 0.1–1.3)
MONOCYTES NFR BLD: 5 % (ref 4–12)
NEUTS SEG # BLD: 7.8 K/UL (ref 1.7–8.2)
NEUTS SEG NFR BLD: 69 % (ref 43–78)
NITRITE UR QL STRIP.AUTO: NEGATIVE
NRBC # BLD: 0 K/UL (ref 0–0.2)
PH UR STRIP: 5.5 (ref 5–9)
PLATELET # BLD AUTO: 212 K/UL (ref 150–450)
PMV BLD AUTO: 10.5 FL (ref 9.4–12.3)
POTASSIUM SERPL-SCNC: 4.1 MMOL/L (ref 3.5–5.1)
PROCALCITONIN SERPL-MCNC: 0.1 NG/ML (ref 0–0.1)
PROT SERPL-MCNC: 7.6 G/DL (ref 6.3–8.2)
PROT UR STRIP-MCNC: NEGATIVE MG/DL
RBC # BLD AUTO: 5.41 M/UL (ref 4.23–5.6)
SERVICE CMNT-IMP: ABNORMAL
SODIUM SERPL-SCNC: 137 MMOL/L (ref 136–145)
SP GR UR REFRACTOMETRY: >1.035 (ref 1–1.02)
UROBILINOGEN UR QL STRIP.AUTO: 0.2 EU/DL (ref 0.2–1)
WBC # BLD AUTO: 11.3 K/UL (ref 4.3–11.1)

## 2024-07-26 PROCEDURE — 96375 TX/PRO/DX INJ NEW DRUG ADDON: CPT

## 2024-07-26 PROCEDURE — 96367 TX/PROPH/DG ADDL SEQ IV INF: CPT

## 2024-07-26 PROCEDURE — 85025 COMPLETE CBC W/AUTO DIFF WBC: CPT

## 2024-07-26 PROCEDURE — 84145 PROCALCITONIN (PCT): CPT

## 2024-07-26 PROCEDURE — 72193 CT PELVIS W/DYE: CPT

## 2024-07-26 PROCEDURE — 93005 ELECTROCARDIOGRAM TRACING: CPT | Performed by: EMERGENCY MEDICINE

## 2024-07-26 PROCEDURE — 99285 EMERGENCY DEPT VISIT HI MDM: CPT

## 2024-07-26 PROCEDURE — 82962 GLUCOSE BLOOD TEST: CPT

## 2024-07-26 PROCEDURE — 87040 BLOOD CULTURE FOR BACTERIA: CPT

## 2024-07-26 PROCEDURE — 6360000002 HC RX W HCPCS

## 2024-07-26 PROCEDURE — 80053 COMPREHEN METABOLIC PANEL: CPT

## 2024-07-26 PROCEDURE — 83605 ASSAY OF LACTIC ACID: CPT

## 2024-07-26 PROCEDURE — 6360000004 HC RX CONTRAST MEDICATION

## 2024-07-26 PROCEDURE — 6370000000 HC RX 637 (ALT 250 FOR IP)

## 2024-07-26 PROCEDURE — 81003 URINALYSIS AUTO W/O SCOPE: CPT

## 2024-07-26 RX ORDER — CITALOPRAM HYDROBROMIDE 20 MG/1
20 TABLET ORAL DAILY
Qty: 90 TABLET | Refills: 1 | OUTPATIENT
Start: 2024-07-26

## 2024-07-26 RX ORDER — CLINDAMYCIN PHOSPHATE 600 MG/50ML
600 INJECTION, SOLUTION INTRAVENOUS
Status: COMPLETED | OUTPATIENT
Start: 2024-07-26 | End: 2024-07-26

## 2024-07-26 RX ORDER — ONDANSETRON 2 MG/ML
4 INJECTION INTRAMUSCULAR; INTRAVENOUS
Status: COMPLETED | OUTPATIENT
Start: 2024-07-26 | End: 2024-07-26

## 2024-07-26 RX ORDER — ACETAMINOPHEN 500 MG
1000 TABLET ORAL
Status: COMPLETED | OUTPATIENT
Start: 2024-07-26 | End: 2024-07-26

## 2024-07-26 RX ORDER — KETOROLAC TROMETHAMINE 30 MG/ML
30 INJECTION, SOLUTION INTRAMUSCULAR; INTRAVENOUS ONCE
Status: COMPLETED | OUTPATIENT
Start: 2024-07-26 | End: 2024-07-26

## 2024-07-26 RX ADMIN — CLINDAMYCIN PHOSPHATE 600 MG: 600 INJECTION, SOLUTION INTRAVENOUS at 21:46

## 2024-07-26 RX ADMIN — ACETAMINOPHEN 1000 MG: 500 TABLET, FILM COATED ORAL at 21:43

## 2024-07-26 RX ADMIN — KETOROLAC TROMETHAMINE 30 MG: 30 INJECTION, SOLUTION INTRAMUSCULAR at 20:23

## 2024-07-26 RX ADMIN — IOPAMIDOL 100 ML: 755 INJECTION, SOLUTION INTRAVENOUS at 20:41

## 2024-07-26 RX ADMIN — ONDANSETRON 4 MG: 2 INJECTION INTRAMUSCULAR; INTRAVENOUS at 20:23

## 2024-07-26 NOTE — ED PROVIDER NOTES
Emergency Department Provider Note       PCP: Jacey Anne PA   Age: 48 y.o.   Sex: male     DISPOSITION         ICD-10-CM    1. Scrotal abscess  N49.2           Medical Decision Making     Patient presented with concerns of scrotal pain x 2 days.  Was started on Bactrim by PCP yesterday but did not start taking until today.  Took 2 p.o. doses.  Pain became so significant and erythema/swelling progressed the patient presented to the ED for evaluation.  On examination he has significant scrotal erythema, swelling, firmness and tenderness to palpation.  No necrotic tissue appreciated on examination.  Septic workup, CT pelvis pursued.  Leukocytosis 11.3.  Lactic acid and procalcitonin within normal limits.  Patient is a diabetic, notably hyperglycemia 266.  No anion gap elevation.  Do not suspect DKA.  Diabetic condition very likely contributing to this development.  CTAP is pending at time of shift change.  Given IV clindamycin ED course.  Patient will very likely require urologic versus general surgical intervention and hospital admission.    10:31 PM EDT The patient's care will be transitioned to Dr. Engle.  At this time, the plan is pending CT read.  Diffuse abscess formation noted by me on CT imaging, will require surgical versus urologic intervention for further evaluation and management.  Please see that provider's documentation for further information.     ED Course as of 07/26/24 2232 Fri Jul 26, 2024 2053 EKG interpretation: Sinus rhythm, rate of 57, normal axis, frequent PACs present.  No obvious ischemia [BR]      ED Course User Index  [BR] Everton Bryant, DO     1 acute complicated illness or injury.  Shared medical decision making was utilized in creating the patients health plan today.    I independently ordered and reviewed each unique test.       I interpreted the labs.              History     48-year-old male presenting to emergency department concerns of

## 2024-07-26 NOTE — ED TRIAGE NOTES
Pt reports groin abscess x 2 days. Pt reports seen by pcp yesterday placed on oral abx. Pt reports worsening swelling and pain today. Pt reports some nausea. Denies F/C, Vomiting.

## 2024-07-27 ENCOUNTER — APPOINTMENT (OUTPATIENT)
Dept: ULTRASOUND IMAGING | Age: 49
End: 2024-07-27
Payer: MEDICARE

## 2024-07-27 VITALS
SYSTOLIC BLOOD PRESSURE: 132 MMHG | TEMPERATURE: 97.7 F | RESPIRATION RATE: 19 BRPM | BODY MASS INDEX: 32.07 KG/M2 | OXYGEN SATURATION: 95 % | HEIGHT: 73 IN | HEART RATE: 75 BPM | WEIGHT: 242 LBS | DIASTOLIC BLOOD PRESSURE: 76 MMHG

## 2024-07-27 LAB
EKG ATRIAL RATE: 86 BPM
EKG DIAGNOSIS: NORMAL
EKG P AXIS: 80 DEGREES
EKG P-R INTERVAL: 158 MS
EKG Q-T INTERVAL: 369 MS
EKG QRS DURATION: 103 MS
EKG QTC CALCULATION (BAZETT): 442 MS
EKG R AXIS: 46 DEGREES
EKG T AXIS: 84 DEGREES
EKG VENTRICULAR RATE: 86 BPM

## 2024-07-27 PROCEDURE — 96365 THER/PROPH/DIAG IV INF INIT: CPT

## 2024-07-27 PROCEDURE — 6360000002 HC RX W HCPCS: Performed by: EMERGENCY MEDICINE

## 2024-07-27 PROCEDURE — 76870 US EXAM SCROTUM: CPT

## 2024-07-27 PROCEDURE — 96367 TX/PROPH/DG ADDL SEQ IV INF: CPT

## 2024-07-27 PROCEDURE — 96368 THER/DIAG CONCURRENT INF: CPT

## 2024-07-27 PROCEDURE — 2580000003 HC RX 258: Performed by: EMERGENCY MEDICINE

## 2024-07-27 PROCEDURE — 96366 THER/PROPH/DIAG IV INF ADDON: CPT

## 2024-07-27 RX ORDER — HYDROCODONE BITARTRATE AND ACETAMINOPHEN 5; 325 MG/1; MG/1
1 TABLET ORAL EVERY 6 HOURS PRN
Qty: 12 TABLET | Refills: 0 | Status: SHIPPED | OUTPATIENT
Start: 2024-07-27 | End: 2024-07-30

## 2024-07-27 RX ADMIN — Medication 2500 MG: at 00:26

## 2024-07-27 RX ADMIN — PIPERACILLIN AND TAZOBACTAM 4500 MG: 4; .5 INJECTION, POWDER, LYOPHILIZED, FOR SOLUTION INTRAVENOUS at 00:05

## 2024-07-27 NOTE — CONSULTS
[facility]  [address] [place] [address]  [phone]    Esteban Muhammad  : 1975     HPI   48 y.o., male seen in consultation for a L scrotal abscess.  Pt reports a two day history of progressive L scrotal swelling and pain.  U/S and CT confirm a abscess.  DM.      Past Medical History:   Diagnosis Date    Chronic back pain     Depression     Diabetes (HCC)     Type 2 on oral agents    DVT (deep venous thrombosis) (HCC)     Erectile dysfunction     Hydrocele     Hyperlipidemia     Hypertension     Lumbago 2014    Lumbar degenerative disc disease 2014    Obesity     Other forms of angina pectoris (HCC) 2014    Psoriasis     PTSD (post-traumatic stress disorder)     Tachycardia 2014    Ulnar nerve lesion 2015     Past Surgical History:   Procedure Laterality Date    APPENDECTOMY  2019    Appendectomy     CARDIAC PROCEDURE N/A 2024    Left heart cath / coronary angiography performed by Scott Shah MD at [facility] CARDIAC CATH LAB    HERNIA REPAIR Right 2019    Right inguinal hernia repair with mesh - no records- in [place]    HYDROCELE EXCISION      IR TRANSLUMINAL BALLOON PTA 1ST VEIN  2018    angiogram of the right - [place]    LUMBAR FUSION      L4-L5 fusion     PRE-MALIGNANT / BENIGN SKIN LESION EXCISION      brown recluse bite excision     No current facility-administered medications for this encounter.     Current Outpatient Medications   Medication Sig Dispense Refill    HYDROcodone-acetaminophen (NORCO) 5-325 MG per tablet Take 1 tablet by mouth every 6 hours as needed for Pain for up to 3 days. Intended supply: 3 days. Take lowest dose possible to manage pain Max Daily Amount: 4 tablets 12 tablet 0    sulfamethoxazole-trimethoprim (BACTRIM DS;SEPTRA DS) 800-160 MG per tablet Take 1 tablet by mouth 2 times daily for 7 days 14 tablet 0    lidocaine 4 % external patch Place 1 patch onto the skin daily 30 patch 0    naproxen sodium

## 2024-07-27 NOTE — ED PROVIDER NOTES
Emergency Department Provider Signout / Continuation of Care Note         DISPOSITION         ICD-10-CM    1. Scrotal abscess  N49.2           The patient's care was signed out to me at shift change.      Final Plan      Dr. Pickett evaluated the patient, found to abscess to the lateral scrotal wall to be draining already, recommend 10 days of antibiotics and follow-up in the office.  ED Course as of 07/27/24 0703   Fri Jul 26, 2024 2053 EKG interpretation: Sinus rhythm, rate of 57, normal axis, frequent PACs present.  No obvious ischemia [BR]   Sat Jul 27, 2024   0451 Urology consulted at 12:30 AM.  Requested ultrasound.  Ultrasound obtained, still waiting for radiologist to interpret. [BB]   0614 Discussed case with urology who will evaluate in ER.  Discussed case with hospitalist who deferred to urology for admission if they feel it necessary. [BB]      ED Course User Index  [BB] Hiren Engle MD  [BR] Everton Bryant, DO       1 or more acute illnesses that pose a threat to life or bodily function.   Discussion with external consultants.       I interpreted the CT Scan CT pelvis with contrast reveals 2 cm fluid collection in the left scrotal wall consistent with abscess..  I interpreted the Ultrasound  ultrasound of the scrotum and testicles reveals complex mass to his lateral left testicle with diffuse scrotal wall thickening.    The management of this patient was discussed with an external consultant.      Exclusion criteria - the patient is NOT to be included for SEP-1 Core Measure due to: May have criteria for sepsis, but does not meet criteria for severe sepsis or septic shock         Hiren Engle MD  07/27/24 6752

## 2024-07-27 NOTE — ED NOTES
Patient mobility status  with no difficulty. Provider aware     I have reviewed discharge instructions with the patient.  The patient verbalized understanding.    Patient left ED via Discharge Method: ambulatory to Home with  self .    Opportunity for questions and clarification provided.     Patient given 1 script.

## 2024-07-28 LAB
BACTERIA SPEC CULT: NORMAL
BACTERIA SPEC CULT: NORMAL
SERVICE CMNT-IMP: NORMAL
SERVICE CMNT-IMP: NORMAL

## 2024-08-01 ENCOUNTER — OFFICE VISIT (OUTPATIENT)
Dept: FAMILY MEDICINE CLINIC | Facility: CLINIC | Age: 49
End: 2024-08-01
Payer: MEDICARE

## 2024-08-01 VITALS
WEIGHT: 242.5 LBS | BODY MASS INDEX: 32.14 KG/M2 | HEIGHT: 73 IN | SYSTOLIC BLOOD PRESSURE: 108 MMHG | TEMPERATURE: 98.3 F | OXYGEN SATURATION: 98 % | DIASTOLIC BLOOD PRESSURE: 66 MMHG | HEART RATE: 91 BPM

## 2024-08-01 DIAGNOSIS — N49.2 ABSCESS, SCROTUM: Primary | ICD-10-CM

## 2024-08-01 PROCEDURE — G8417 CALC BMI ABV UP PARAM F/U: HCPCS

## 2024-08-01 PROCEDURE — 99214 OFFICE O/P EST MOD 30 MIN: CPT

## 2024-08-01 PROCEDURE — 4004F PT TOBACCO SCREEN RCVD TLK: CPT

## 2024-08-01 PROCEDURE — G8427 DOCREV CUR MEDS BY ELIG CLIN: HCPCS

## 2024-08-01 RX ORDER — DOXYCYCLINE HYCLATE 100 MG
100 TABLET ORAL 2 TIMES DAILY
Qty: 14 TABLET | Refills: 0 | Status: SHIPPED | OUTPATIENT
Start: 2024-08-01 | End: 2024-08-08

## 2024-08-01 NOTE — PATIENT INSTRUCTIONS
Would recommend finishing Bactrim today and starting Doxycycline. Start on probiotic over the counter with this antibiotic. We are referring to General Surgery for eval and possible incision/drainage.

## 2024-08-01 NOTE — PROGRESS NOTES
Esteban Muhammad (: 1975) is a 48 y.o. male, established patient, here for evaluation of the following chief complaint(s):  Follow-up (Still has a mass, not as large or painful.)       ASSESSMENT/PLAN:  1. Abscess, scrotum  -     doxycycline hyclate (VIBRA-TABS) 100 MG tablet; Take 1 tablet by mouth 2 times daily for 7 days, Disp-14 tablet, R-0Normal  -     [facility]      SUBJECTIVE/OBJECTIVE:  HPI    F/U abscess:     Was seen on , started on Bactrim x7 days with noted abscess to left testicle. Was seen in ER on  due to increased redness and swelling of testicle. General surgery consulted- had started to drain and did not attempt to incise. Was receiving IV abx therapy during this visit. US of testicle with complex vs solid mass. CT pelvis noting 2 cm fluid collection likely abscess.     Returns today, relaying pain is better, has decreased in size. Induration still noted on exam. GARO Mari stepped into room for further assistance. After examination, agreement to move forward with general surgery consult and to extend abx therapy. Advised to finish Bactrim today, adding Doxy x7 days. We discussed S/S that warrant ER eval. He declined STD eval at this visit as well.     Vitals:    24 0930   BP: 108/66   Pulse: 91   Temp: 98.3 °F (36.8 °C)   SpO2: 98%         Physical Exam  Genitourinary:     Comments: Abscess noted under left testicle. Induration noted. Minimal erythema. No drainage. No red streaking. Minimal pain with palpation.         An electronic signature was used to authenticate this note.  -- Kathy Castillo, LEONELA - CNP

## 2024-08-05 DIAGNOSIS — N49.2 ABSCESS, SCROTUM: Primary | ICD-10-CM

## 2024-08-28 DIAGNOSIS — F41.9 ANXIETY: ICD-10-CM

## 2024-08-28 RX ORDER — CITALOPRAM HYDROBROMIDE 20 MG/1
20 TABLET ORAL DAILY
Qty: 30 TABLET | Refills: 1 | OUTPATIENT
Start: 2024-08-28

## 2024-09-18 ENCOUNTER — PATIENT MESSAGE (OUTPATIENT)
Dept: FAMILY MEDICINE CLINIC | Facility: CLINIC | Age: 49
End: 2024-09-18

## 2024-09-19 ENCOUNTER — APPOINTMENT (OUTPATIENT)
Dept: GENERAL RADIOLOGY | Age: 49
End: 2024-09-19
Payer: MEDICARE

## 2024-09-19 ENCOUNTER — HOSPITAL ENCOUNTER (EMERGENCY)
Age: 49
Discharge: HOME | End: 2024-09-19
Attending: STUDENT IN AN ORGANIZED HEALTH CARE EDUCATION/TRAINING PROGRAM
Payer: MEDICARE

## 2024-09-19 VITALS
BODY MASS INDEX: 33.13 KG/M2 | TEMPERATURE: 98.8 F | HEIGHT: 73 IN | OXYGEN SATURATION: 93 % | DIASTOLIC BLOOD PRESSURE: 77 MMHG | HEART RATE: 93 BPM | WEIGHT: 250 LBS | SYSTOLIC BLOOD PRESSURE: 99 MMHG | RESPIRATION RATE: 12 BRPM

## 2024-09-19 DIAGNOSIS — R07.9 CHEST PAIN, UNSPECIFIED TYPE: Primary | ICD-10-CM

## 2024-09-19 DIAGNOSIS — J06.9 URI WITH COUGH AND CONGESTION: ICD-10-CM

## 2024-09-19 LAB
ALBUMIN SERPL-MCNC: 3.8 G/DL (ref 3.5–5)
ALBUMIN/GLOB SERPL: 1.1 (ref 1–1.9)
ALP SERPL-CCNC: 127 U/L (ref 40–129)
ALT SERPL-CCNC: 29 U/L (ref 12–65)
ANION GAP SERPL CALC-SCNC: 13 MMOL/L (ref 9–18)
AST SERPL-CCNC: 27 U/L (ref 15–37)
BASOPHILS # BLD: 0 K/UL (ref 0–0.2)
BASOPHILS NFR BLD: 1 % (ref 0–2)
BILIRUB SERPL-MCNC: 0.3 MG/DL (ref 0–1.2)
BUN SERPL-MCNC: 10 MG/DL (ref 6–23)
CALCIUM SERPL-MCNC: 9.2 MG/DL (ref 8.8–10.2)
CHLORIDE SERPL-SCNC: 96 MMOL/L (ref 98–107)
CO2 SERPL-SCNC: 23 MMOL/L (ref 20–28)
CREAT SERPL-MCNC: 0.78 MG/DL (ref 0.8–1.3)
DIFFERENTIAL METHOD BLD: NORMAL
EOSINOPHIL # BLD: 0.3 K/UL (ref 0–0.8)
EOSINOPHIL NFR BLD: 4 % (ref 0.5–7.8)
ERYTHROCYTE [DISTWIDTH] IN BLOOD BY AUTOMATED COUNT: 12.8 % (ref 11.9–14.6)
GLOBULIN SER CALC-MCNC: 3.5 G/DL (ref 2.3–3.5)
GLUCOSE SERPL-MCNC: 458 MG/DL (ref 70–99)
HCT VFR BLD AUTO: 46.9 % (ref 41.1–50.3)
HGB BLD-MCNC: 16.1 G/DL (ref 13.6–17.2)
IMM GRANULOCYTES # BLD AUTO: 0 K/UL (ref 0–0.5)
IMM GRANULOCYTES NFR BLD AUTO: 0 % (ref 0–5)
LYMPHOCYTES # BLD: 1.6 K/UL (ref 0.5–4.6)
LYMPHOCYTES NFR BLD: 23 % (ref 13–44)
MCH RBC QN AUTO: 29.6 PG (ref 26.1–32.9)
MCHC RBC AUTO-ENTMCNC: 34.3 G/DL (ref 31.4–35)
MCV RBC AUTO: 86.2 FL (ref 82–102)
MONOCYTES # BLD: 0.6 K/UL (ref 0.1–1.3)
MONOCYTES NFR BLD: 9 % (ref 4–12)
NEUTS SEG # BLD: 4.6 K/UL (ref 1.7–8.2)
NEUTS SEG NFR BLD: 64 % (ref 43–78)
NRBC # BLD: 0 K/UL (ref 0–0.2)
PLATELET # BLD AUTO: 211 K/UL (ref 150–450)
PMV BLD AUTO: 10.5 FL (ref 9.4–12.3)
POTASSIUM SERPL-SCNC: 4.4 MMOL/L (ref 3.5–5.1)
PROT SERPL-MCNC: 7.3 G/DL (ref 6.3–8.2)
RBC # BLD AUTO: 5.44 M/UL (ref 4.23–5.6)
SARS-COV-2 RDRP RESP QL NAA+PROBE: NOT DETECTED
SODIUM SERPL-SCNC: 132 MMOL/L (ref 136–145)
SOURCE: NORMAL
TROPONIN T SERPL HS-MCNC: 10 NG/L (ref 0–22)
TROPONIN T SERPL HS-MCNC: 13 NG/L (ref 0–22)
WBC # BLD AUTO: 7.2 K/UL (ref 4.3–11.1)

## 2024-09-19 PROCEDURE — 71046 X-RAY EXAM CHEST 2 VIEWS: CPT

## 2024-09-19 PROCEDURE — 93005 ELECTROCARDIOGRAM TRACING: CPT | Performed by: STUDENT IN AN ORGANIZED HEALTH CARE EDUCATION/TRAINING PROGRAM

## 2024-09-19 PROCEDURE — 85025 COMPLETE CBC W/AUTO DIFF WBC: CPT

## 2024-09-19 PROCEDURE — 87635 SARS-COV-2 COVID-19 AMP PRB: CPT

## 2024-09-19 PROCEDURE — 80053 COMPREHEN METABOLIC PANEL: CPT

## 2024-09-19 PROCEDURE — 2580000003 HC RX 258: Performed by: STUDENT IN AN ORGANIZED HEALTH CARE EDUCATION/TRAINING PROGRAM

## 2024-09-19 PROCEDURE — 84484 ASSAY OF TROPONIN QUANT: CPT

## 2024-09-19 PROCEDURE — 99285 EMERGENCY DEPT VISIT HI MDM: CPT

## 2024-09-19 RX ORDER — SODIUM CHLORIDE, SODIUM LACTATE, POTASSIUM CHLORIDE, AND CALCIUM CHLORIDE .6; .31; .03; .02 G/100ML; G/100ML; G/100ML; G/100ML
1000 INJECTION, SOLUTION INTRAVENOUS
Status: COMPLETED | OUTPATIENT
Start: 2024-09-19 | End: 2024-09-19

## 2024-09-19 RX ADMIN — SODIUM CHLORIDE, POTASSIUM CHLORIDE, SODIUM LACTATE AND CALCIUM CHLORIDE 1000 ML: 600; 310; 30; 20 INJECTION, SOLUTION INTRAVENOUS at 21:46

## 2024-09-19 ASSESSMENT — PAIN DESCRIPTION - LOCATION: LOCATION: CHEST

## 2024-09-19 ASSESSMENT — PAIN - FUNCTIONAL ASSESSMENT: PAIN_FUNCTIONAL_ASSESSMENT: 0-10

## 2024-09-19 ASSESSMENT — PAIN SCALES - GENERAL: PAINLEVEL_OUTOF10: 6

## 2024-09-19 ASSESSMENT — LIFESTYLE VARIABLES
HOW MANY STANDARD DRINKS CONTAINING ALCOHOL DO YOU HAVE ON A TYPICAL DAY: PATIENT DOES NOT DRINK
HOW OFTEN DO YOU HAVE A DRINK CONTAINING ALCOHOL: NEVER

## 2024-09-19 ASSESSMENT — PAIN DESCRIPTION - PAIN TYPE: TYPE: ACUTE PAIN

## 2024-09-20 LAB
EKG ATRIAL RATE: 95 BPM
EKG DIAGNOSIS: NORMAL
EKG P AXIS: 66 DEGREES
EKG P-R INTERVAL: 156 MS
EKG Q-T INTERVAL: 348 MS
EKG QRS DURATION: 98 MS
EKG QTC CALCULATION (BAZETT): 437 MS
EKG R AXIS: 44 DEGREES
EKG T AXIS: 44 DEGREES
EKG VENTRICULAR RATE: 95 BPM

## 2024-09-20 PROCEDURE — 93010 ELECTROCARDIOGRAM REPORT: CPT | Performed by: INTERNAL MEDICINE

## 2024-09-21 DIAGNOSIS — E78.5 DYSLIPIDEMIA: ICD-10-CM

## 2024-09-22 DIAGNOSIS — E11.65 TYPE 2 DIABETES MELLITUS WITH HYPERGLYCEMIA, WITH LONG-TERM CURRENT USE OF INSULIN (HCC): ICD-10-CM

## 2024-09-22 DIAGNOSIS — Z79.4 TYPE 2 DIABETES MELLITUS WITH HYPERGLYCEMIA, WITH LONG-TERM CURRENT USE OF INSULIN (HCC): ICD-10-CM

## 2024-09-22 DIAGNOSIS — F51.01 PRIMARY INSOMNIA: ICD-10-CM

## 2024-09-23 RX ORDER — TRAZODONE HYDROCHLORIDE 150 MG/1
150 TABLET ORAL DAILY PRN
Qty: 90 TABLET | Refills: 0 | OUTPATIENT
Start: 2024-09-23

## 2024-09-23 RX ORDER — ATORVASTATIN CALCIUM 40 MG/1
40 TABLET, FILM COATED ORAL NIGHTLY
Qty: 90 TABLET | Refills: 0 | OUTPATIENT
Start: 2024-09-23

## 2024-10-02 PROBLEM — M54.50 CHRONIC MIDLINE LOW BACK PAIN WITHOUT SCIATICA: Status: ACTIVE | Noted: 2024-10-02

## 2024-10-02 PROBLEM — G89.29 CHRONIC MIDLINE LOW BACK PAIN WITHOUT SCIATICA: Status: ACTIVE | Noted: 2024-10-02

## 2024-10-02 NOTE — PROGRESS NOTES
Esteban Muhammad  (1975) is an established patient, here for evaluation of the following:    Assessment & Plan   Below is the assessment and plan developed based on review of pertinent history, physical exam, labs, studies, and medications.    Esteban was seen today for diabetes, hyperlipidemia and anxiety.    Diagnoses and all orders for this visit:    Type 2 diabetes mellitus with hyperglycemia, with long-term current use of insulin (HCC)  -     metFORMIN (GLUCOPHAGE) 1000 MG tablet; Take 1 tablet by mouth 2 times daily  -     insulin glargine (LANTUS SOLOSTAR) 100 UNIT/ML injection pen; Inject 30 Units into the skin nightly    Dyslipidemia    Anxiety  -     citalopram (CELEXA) 40 MG tablet; Take 1 tablet by mouth daily    Spinal stenosis of lumbar region with neurogenic claudication    Chronic midline low back pain without sciatica    Need for shingles vaccine  -     zoster recombinant adjuvanted vaccine (SHINGRIX) 50 MCG/0.5ML SUSR injection; Inject 0.5 mLs into the muscle See Admin Instructions 1 dose now and repeat in 2-6 months    History of TIA (transient ischemic attack)    History of MI (myocardial infarction)    Need for hepatitis C screening test  -     Hepatitis C Antibody; Future      Patient has not had labs drawn for today's visit.  He will come in tomorrow to have these drawn.      Will provide him w/ a 90 day supply of meds.  He reports that his FBS has been running around 200.  Will make further recommendations once I have his lab results.  He declines a referral to see a diab nutritionist due to difficulty getting away from work.     Patient was seen in the ER on 7/24/2024 for an exacerbation of chronic low back pain.  X-rays were not concerning and he was prescribed OTC treatment.    Patient was seen in the ER on 7/26/2024 for scrotal abscess.   Ultrasound of the scrotum and testicles reveals complex mass to his lateral left testicle with diffuse scrotal wall thickening.  Patient was started on

## 2024-10-03 ENCOUNTER — TELEMEDICINE (OUTPATIENT)
Dept: FAMILY MEDICINE CLINIC | Facility: CLINIC | Age: 49
End: 2024-10-03

## 2024-10-03 DIAGNOSIS — F41.9 ANXIETY: ICD-10-CM

## 2024-10-03 DIAGNOSIS — Z86.73 HISTORY OF TIA (TRANSIENT ISCHEMIC ATTACK): ICD-10-CM

## 2024-10-03 DIAGNOSIS — G89.29 CHRONIC MIDLINE LOW BACK PAIN WITHOUT SCIATICA: ICD-10-CM

## 2024-10-03 DIAGNOSIS — Z11.59 NEED FOR HEPATITIS C SCREENING TEST: ICD-10-CM

## 2024-10-03 DIAGNOSIS — Z79.4 TYPE 2 DIABETES MELLITUS WITH HYPERGLYCEMIA, WITH LONG-TERM CURRENT USE OF INSULIN (HCC): Primary | ICD-10-CM

## 2024-10-03 DIAGNOSIS — M48.062 SPINAL STENOSIS OF LUMBAR REGION WITH NEUROGENIC CLAUDICATION: ICD-10-CM

## 2024-10-03 DIAGNOSIS — M54.50 CHRONIC MIDLINE LOW BACK PAIN WITHOUT SCIATICA: ICD-10-CM

## 2024-10-03 DIAGNOSIS — Z23 NEED FOR SHINGLES VACCINE: ICD-10-CM

## 2024-10-03 DIAGNOSIS — E78.5 DYSLIPIDEMIA: ICD-10-CM

## 2024-10-03 DIAGNOSIS — E11.65 TYPE 2 DIABETES MELLITUS WITH HYPERGLYCEMIA, WITH LONG-TERM CURRENT USE OF INSULIN (HCC): Primary | ICD-10-CM

## 2024-10-03 DIAGNOSIS — I25.2 HISTORY OF MI (MYOCARDIAL INFARCTION): ICD-10-CM

## 2024-10-03 RX ORDER — GABAPENTIN 300 MG/1
300 CAPSULE ORAL 3 TIMES DAILY
Qty: 90 CAPSULE | Refills: 2 | Status: CANCELLED | OUTPATIENT
Start: 2024-10-03 | End: 2025-01-01

## 2024-10-03 RX ORDER — INSULIN GLARGINE 100 [IU]/ML
30 INJECTION, SOLUTION SUBCUTANEOUS NIGHTLY
Qty: 3 ADJUSTABLE DOSE PRE-FILLED PEN SYRINGE | Refills: 2 | Status: SHIPPED | OUTPATIENT
Start: 2024-10-03

## 2024-10-03 RX ORDER — CITALOPRAM HYDROBROMIDE 40 MG/1
40 TABLET ORAL DAILY
Qty: 90 TABLET | Refills: 0 | Status: SHIPPED | OUTPATIENT
Start: 2024-10-03

## 2024-10-03 RX ORDER — ZOSTER VACCINE RECOMBINANT, ADJUVANTED 50 MCG/0.5
0.5 KIT INTRAMUSCULAR SEE ADMIN INSTRUCTIONS
Qty: 0.5 ML | Refills: 0 | Status: SHIPPED | OUTPATIENT
Start: 2024-10-03 | End: 2025-04-01

## 2024-10-03 ASSESSMENT — PATIENT HEALTH QUESTIONNAIRE - PHQ9
SUM OF ALL RESPONSES TO PHQ QUESTIONS 1-9: 10
SUM OF ALL RESPONSES TO PHQ QUESTIONS 1-9: 10
5. POOR APPETITE OR OVEREATING: MORE THAN HALF THE DAYS
1. LITTLE INTEREST OR PLEASURE IN DOING THINGS: MORE THAN HALF THE DAYS
8. MOVING OR SPEAKING SO SLOWLY THAT OTHER PEOPLE COULD HAVE NOTICED. OR THE OPPOSITE, BEING SO FIGETY OR RESTLESS THAT YOU HAVE BEEN MOVING AROUND A LOT MORE THAN USUAL: NOT AT ALL
10. IF YOU CHECKED OFF ANY PROBLEMS, HOW DIFFICULT HAVE THESE PROBLEMS MADE IT FOR YOU TO DO YOUR WORK, TAKE CARE OF THINGS AT HOME, OR GET ALONG WITH OTHER PEOPLE: SOMEWHAT DIFFICULT
SUM OF ALL RESPONSES TO PHQ QUESTIONS 1-9: 10
SUM OF ALL RESPONSES TO PHQ9 QUESTIONS 1 & 2: 5
9. THOUGHTS THAT YOU WOULD BE BETTER OFF DEAD, OR OF HURTING YOURSELF: NOT AT ALL
2. FEELING DOWN, DEPRESSED OR HOPELESS: NEARLY EVERY DAY
7. TROUBLE CONCENTRATING ON THINGS, SUCH AS READING THE NEWSPAPER OR WATCHING TELEVISION: NOT AT ALL
4. FEELING TIRED OR HAVING LITTLE ENERGY: NOT AT ALL
6. FEELING BAD ABOUT YOURSELF - OR THAT YOU ARE A FAILURE OR HAVE LET YOURSELF OR YOUR FAMILY DOWN: NOT AT ALL
SUM OF ALL RESPONSES TO PHQ QUESTIONS 1-9: 10
3. TROUBLE FALLING OR STAYING ASLEEP: NEARLY EVERY DAY

## 2024-10-06 ENCOUNTER — HOSPITAL ENCOUNTER (EMERGENCY)
Age: 49
Discharge: HOME | End: 2024-10-06
Attending: EMERGENCY MEDICINE
Payer: MEDICARE

## 2024-10-06 ENCOUNTER — APPOINTMENT (OUTPATIENT)
Dept: GENERAL RADIOLOGY | Age: 49
End: 2024-10-06
Payer: MEDICARE

## 2024-10-06 VITALS
OXYGEN SATURATION: 95 % | TEMPERATURE: 98.3 F | SYSTOLIC BLOOD PRESSURE: 117 MMHG | HEART RATE: 86 BPM | WEIGHT: 246 LBS | HEIGHT: 73 IN | RESPIRATION RATE: 18 BRPM | BODY MASS INDEX: 32.6 KG/M2 | DIASTOLIC BLOOD PRESSURE: 73 MMHG

## 2024-10-06 DIAGNOSIS — R07.9 CHEST PAIN, UNSPECIFIED TYPE: Primary | ICD-10-CM

## 2024-10-06 LAB
ALBUMIN SERPL-MCNC: 3.5 G/DL (ref 3.5–5)
ALBUMIN/GLOB SERPL: 1 (ref 1–1.9)
ALP SERPL-CCNC: 127 U/L (ref 40–129)
ALT SERPL-CCNC: 21 U/L (ref 8–55)
ANION GAP SERPL CALC-SCNC: 14 MMOL/L (ref 9–18)
AST SERPL-CCNC: 27 U/L (ref 15–37)
BASOPHILS # BLD: 0.1 K/UL (ref 0–0.2)
BASOPHILS NFR BLD: 1 % (ref 0–2)
BILIRUB SERPL-MCNC: 0.3 MG/DL (ref 0–1.2)
BUN SERPL-MCNC: 15 MG/DL (ref 6–23)
CALCIUM SERPL-MCNC: 9.3 MG/DL (ref 8.8–10.2)
CHLORIDE SERPL-SCNC: 93 MMOL/L (ref 98–107)
CO2 SERPL-SCNC: 26 MMOL/L (ref 20–28)
CREAT SERPL-MCNC: 0.83 MG/DL (ref 0.8–1.3)
DIFFERENTIAL METHOD BLD: NORMAL
EOSINOPHIL # BLD: 0.2 K/UL (ref 0–0.8)
EOSINOPHIL NFR BLD: 2 % (ref 0.5–7.8)
ERYTHROCYTE [DISTWIDTH] IN BLOOD BY AUTOMATED COUNT: 12.4 % (ref 11.9–14.6)
GLOBULIN SER CALC-MCNC: 3.4 G/DL (ref 2.3–3.5)
GLUCOSE BLD STRIP.AUTO-MCNC: 226 MG/DL (ref 65–100)
GLUCOSE SERPL-MCNC: 459 MG/DL (ref 70–99)
HCT VFR BLD AUTO: 45 % (ref 41.1–50.3)
HGB BLD-MCNC: 15.6 G/DL (ref 13.6–17.2)
IMM GRANULOCYTES # BLD AUTO: 0.1 K/UL (ref 0–0.5)
IMM GRANULOCYTES NFR BLD AUTO: 1 % (ref 0–5)
LYMPHOCYTES # BLD: 2.5 K/UL (ref 0.5–4.6)
LYMPHOCYTES NFR BLD: 27 % (ref 13–44)
MCH RBC QN AUTO: 29.4 PG (ref 26.1–32.9)
MCHC RBC AUTO-ENTMCNC: 34.7 G/DL (ref 31.4–35)
MCV RBC AUTO: 84.9 FL (ref 82–102)
MONOCYTES # BLD: 0.5 K/UL (ref 0.1–1.3)
MONOCYTES NFR BLD: 5 % (ref 4–12)
NEUTS SEG # BLD: 6 K/UL (ref 1.7–8.2)
NEUTS SEG NFR BLD: 65 % (ref 43–78)
NRBC # BLD: 0 K/UL (ref 0–0.2)
PLATELET # BLD AUTO: 218 K/UL (ref 150–450)
PMV BLD AUTO: 10.6 FL (ref 9.4–12.3)
POTASSIUM SERPL-SCNC: 4.3 MMOL/L (ref 3.5–5.1)
PROT SERPL-MCNC: 6.8 G/DL (ref 6.3–8.2)
RBC # BLD AUTO: 5.3 M/UL (ref 4.23–5.6)
SERVICE CMNT-IMP: ABNORMAL
SODIUM SERPL-SCNC: 133 MMOL/L (ref 136–145)
TROPONIN T SERPL HS-MCNC: 13 NG/L (ref 0–22)
TROPONIN T SERPL HS-MCNC: 13 NG/L (ref 0–22)
WBC # BLD AUTO: 9.2 K/UL (ref 4.3–11.1)

## 2024-10-06 PROCEDURE — 2580000003 HC RX 258: Performed by: EMERGENCY MEDICINE

## 2024-10-06 PROCEDURE — 96374 THER/PROPH/DIAG INJ IV PUSH: CPT

## 2024-10-06 PROCEDURE — 6370000000 HC RX 637 (ALT 250 FOR IP): Performed by: EMERGENCY MEDICINE

## 2024-10-06 PROCEDURE — 84484 ASSAY OF TROPONIN QUANT: CPT

## 2024-10-06 PROCEDURE — 80053 COMPREHEN METABOLIC PANEL: CPT

## 2024-10-06 PROCEDURE — 82962 GLUCOSE BLOOD TEST: CPT

## 2024-10-06 PROCEDURE — 93005 ELECTROCARDIOGRAM TRACING: CPT | Performed by: EMERGENCY MEDICINE

## 2024-10-06 PROCEDURE — 71045 X-RAY EXAM CHEST 1 VIEW: CPT

## 2024-10-06 PROCEDURE — 99285 EMERGENCY DEPT VISIT HI MDM: CPT

## 2024-10-06 PROCEDURE — 85025 COMPLETE CBC W/AUTO DIFF WBC: CPT

## 2024-10-06 RX ORDER — 0.9 % SODIUM CHLORIDE 0.9 %
1000 INTRAVENOUS SOLUTION INTRAVENOUS ONCE
Status: COMPLETED | OUTPATIENT
Start: 2024-10-06 | End: 2024-10-06

## 2024-10-06 RX ADMIN — SODIUM CHLORIDE 1000 ML: 9 INJECTION, SOLUTION INTRAVENOUS at 21:42

## 2024-10-06 RX ADMIN — INSULIN HUMAN 10 UNITS: 100 INJECTION, SOLUTION PARENTERAL at 21:42

## 2024-10-06 ASSESSMENT — PAIN - FUNCTIONAL ASSESSMENT: PAIN_FUNCTIONAL_ASSESSMENT: 0-10

## 2024-10-06 ASSESSMENT — PAIN SCALES - GENERAL: PAINLEVEL_OUTOF10: 8

## 2024-10-06 ASSESSMENT — PAIN DESCRIPTION - LOCATION: LOCATION: CHEST

## 2024-10-07 LAB
EKG ATRIAL RATE: 97 BPM
EKG DIAGNOSIS: NORMAL
EKG P AXIS: 4 DEGREES
EKG P-R INTERVAL: 154 MS
EKG Q-T INTERVAL: 340 MS
EKG QRS DURATION: 94 MS
EKG QTC CALCULATION (BAZETT): 431 MS
EKG R AXIS: 21 DEGREES
EKG T AXIS: 60 DEGREES
EKG VENTRICULAR RATE: 97 BPM

## 2024-10-07 PROCEDURE — 93010 ELECTROCARDIOGRAM REPORT: CPT | Performed by: INTERNAL MEDICINE

## 2024-10-07 NOTE — ED TRIAGE NOTES
Pt presents with co chest pain for about 1 hour prior to arrival.  States pain is worse when he walks.

## 2024-10-07 NOTE — ED PROVIDER NOTES
no numbness, no orthopnea, no palpitations, no PND and no shortness of breath    Risk factors: coronary artery disease and diabetes mellitus        ROS     Review of Systems   Constitutional:  Negative for fever.   HENT:  Negative for trouble swallowing.    Respiratory:  Negative for shortness of breath.    Cardiovascular:  Positive for chest pain. Negative for palpitations, orthopnea and PND.   Gastrointestinal:  Negative for abdominal pain, anorexia, heartburn and nausea.   Neurological:  Negative for numbness and headaches.   All other systems reviewed and are negative.       Physical Exam     Vitals signs and nursing note reviewed:  Vitals:    10/06/24 2023   BP: 119/76   Pulse: (!) 103   Resp: 20   Temp: 99 °F (37.2 °C)   TempSrc: Oral   SpO2: 95%   Weight: 111.6 kg (246 lb)   Height: 1.854 m (6' 1\")      Physical Exam  Vitals and nursing note reviewed.   Constitutional:       Appearance: Normal appearance.   HENT:      Head: Normocephalic and atraumatic.      Nose: Nose normal.      Mouth/Throat:      Mouth: Mucous membranes are moist.   Eyes:      Extraocular Movements: Extraocular movements intact.      Conjunctiva/sclera: Conjunctivae normal.      Pupils: Pupils are equal, round, and reactive to light.   Cardiovascular:      Rate and Rhythm: Normal rate.      Pulses: Normal pulses.      Heart sounds: Normal heart sounds.   Pulmonary:      Effort: Pulmonary effort is normal.      Breath sounds: Normal breath sounds.   Abdominal:      General: Abdomen is flat.   Musculoskeletal:         General: Normal range of motion.      Cervical back: Normal range of motion and neck supple.   Skin:     General: Skin is warm.      Capillary Refill: Capillary refill takes less than 2 seconds.   Neurological:      General: No focal deficit present.      Mental Status: He is alert and oriented to person, place, and time.   Psychiatric:         Mood and Affect: Mood normal.         Behavior: Behavior normal.         Thought

## 2024-11-29 DIAGNOSIS — E78.5 DYSLIPIDEMIA: ICD-10-CM

## 2024-11-29 RX ORDER — ATORVASTATIN CALCIUM 40 MG/1
40 TABLET, FILM COATED ORAL NIGHTLY
Qty: 90 TABLET | Refills: 0 | OUTPATIENT
Start: 2024-11-29

## 2024-12-19 ENCOUNTER — TELEPHONE (OUTPATIENT)
Dept: FAMILY MEDICINE CLINIC | Facility: CLINIC | Age: 49
End: 2024-12-19

## 2024-12-19 NOTE — TELEPHONE ENCOUNTER
----- Message from GARO Polanco sent at 12/19/2024  3:26 PM EST -----  Regarding: Needs labs  Pt did not have labs drawn prior to his last appt w/ me and did not come in to have them drawn following the visit as agreed.  Please arrange for him to come in for his labs prior to his visit w/ me next Monday. I must have his labs in order to provide appropriate med refills. If he cannot come in before Monday for his labs, please reschedule his appointments.

## 2024-12-20 DIAGNOSIS — E78.5 DYSLIPIDEMIA: ICD-10-CM

## 2024-12-20 RX ORDER — ATORVASTATIN CALCIUM 40 MG/1
40 TABLET, FILM COATED ORAL NIGHTLY
Qty: 90 TABLET | Refills: 0 | OUTPATIENT
Start: 2024-12-20

## 2025-01-05 DIAGNOSIS — Z79.4 TYPE 2 DIABETES MELLITUS WITH HYPERGLYCEMIA, WITH LONG-TERM CURRENT USE OF INSULIN (HCC): ICD-10-CM

## 2025-01-05 DIAGNOSIS — E11.65 TYPE 2 DIABETES MELLITUS WITH HYPERGLYCEMIA, WITH LONG-TERM CURRENT USE OF INSULIN (HCC): ICD-10-CM

## 2025-01-05 DIAGNOSIS — F41.9 ANXIETY: ICD-10-CM

## 2025-01-06 RX ORDER — CITALOPRAM HYDROBROMIDE 40 MG/1
40 TABLET ORAL DAILY
Qty: 90 TABLET | Refills: 0 | OUTPATIENT
Start: 2025-01-06

## 2025-02-12 ENCOUNTER — TELEPHONE (OUTPATIENT)
Dept: FAMILY MEDICINE CLINIC | Facility: CLINIC | Age: 50
End: 2025-02-12

## 2025-02-12 NOTE — TELEPHONE ENCOUNTER
----- Message from GARO Polanco sent at 12/23/2024 10:21 AM EST -----  Regarding: RE: Needs labs  Can you reach out to him to try to get him rescheduled?    GARO Polanco  ----- Message -----  From: Roya Pastrana MA  Sent: 12/20/2024   3:44 PM EST  To: GARO Polanco  Subject: RE: Needs labs                                   Patient canceled appt through [employer].  ----- Message -----  From: Jacey Anne PA  Sent: 12/19/2024   3:28 PM EST  To: Roya Pastrana MA  Subject: Needs labs                                       Pt did not have labs drawn prior to his last appt w/ me and did not come in to have them drawn following the visit as agreed.  Please arrange for him to come in for his labs prior to his visit w/ me next Monday. I must have his labs in order to provide appropriate med refills. If he cannot come in before Monday for his labs, please reschedule his appointments.

## 2025-02-14 ENCOUNTER — TELEPHONE (OUTPATIENT)
Dept: FAMILY MEDICINE CLINIC | Facility: CLINIC | Age: 50
End: 2025-02-14

## 2025-02-14 NOTE — TELEPHONE ENCOUNTER
----- Message from GARO Polanco sent at 12/23/2024 10:21 AM EST -----  Regarding: RE: Needs labs  Can you reach out to him to try to get him rescheduled?    GARO Polanco  ----- Message -----  From: Roya Pastrana MA  Sent: 12/20/2024   3:44 PM EST  To: GARO Polanco  Subject: RE: Needs labs                                   Patient canceled appt through [employer].  ----- Message -----  From: Jacey Anne PA  Sent: 12/19/2024   3:28 PM EST  To: Roya Pastrana MA  Subject: Needs labs                                       Pt did not have labs drawn prior to his last appt w/ me and did not come in to have them drawn following the visit as agreed.  Please arrange for him to come in for his labs prior to his visit w/ me next Monday. I must have his labs in order to provide appropriate med refills. If he cannot come in before Monday for his labs, please reschedule his appointments.

## 2025-02-21 ENCOUNTER — TELEPHONE (OUTPATIENT)
Dept: FAMILY MEDICINE CLINIC | Facility: CLINIC | Age: 50
End: 2025-02-21

## 2025-02-21 NOTE — TELEPHONE ENCOUNTER
----- Message from GRAO Polanco sent at 12/23/2024 10:21 AM EST -----  Regarding: RE: Needs labs  Can you reach out to him to try to get him rescheduled?    GARO Polanco  ----- Message -----  From: Roya Pastrana MA  Sent: 12/20/2024   3:44 PM EST  To: GARO Polanco  Subject: RE: Needs labs                                   Patient canceled appt through [employer].  ----- Message -----  From: Jacey Anne PA  Sent: 12/19/2024   3:28 PM EST  To: Roya Pastrana MA  Subject: Needs labs                                       Pt did not have labs drawn prior to his last appt w/ me and did not come in to have them drawn following the visit as agreed.  Please arrange for him to come in for his labs prior to his visit w/ me next Monday. I must have his labs in order to provide appropriate med refills. If he cannot come in before Monday for his labs, please reschedule his appointments.

## 2025-04-27 ENCOUNTER — APPOINTMENT (OUTPATIENT)
Dept: GENERAL RADIOLOGY | Age: 50
End: 2025-04-27
Payer: MEDICARE

## 2025-04-27 ENCOUNTER — HOSPITAL ENCOUNTER (EMERGENCY)
Age: 50
Discharge: PSYCHIATRIC OR SUBSTANCE USE FACILITY | End: 2025-04-28
Attending: EMERGENCY MEDICINE
Payer: MEDICARE

## 2025-04-27 VITALS
HEIGHT: 73 IN | SYSTOLIC BLOOD PRESSURE: 135 MMHG | TEMPERATURE: 98.1 F | OXYGEN SATURATION: 95 % | WEIGHT: 246 LBS | DIASTOLIC BLOOD PRESSURE: 89 MMHG | BODY MASS INDEX: 32.6 KG/M2 | HEART RATE: 66 BPM | RESPIRATION RATE: 17 BRPM

## 2025-04-27 DIAGNOSIS — R45.851 DEPRESSION WITH SUICIDAL IDEATION: Primary | ICD-10-CM

## 2025-04-27 DIAGNOSIS — F32.A DEPRESSION WITH SUICIDAL IDEATION: Primary | ICD-10-CM

## 2025-04-27 LAB
ALBUMIN SERPL-MCNC: 3.7 G/DL (ref 3.5–5)
ALBUMIN/GLOB SERPL: 1.2 (ref 1–1.9)
ALP SERPL-CCNC: 104 U/L (ref 40–129)
ALT SERPL-CCNC: 22 U/L (ref 8–55)
AMPHET UR QL SCN: NEGATIVE
ANION GAP SERPL CALC-SCNC: 11 MMOL/L (ref 7–16)
APPEARANCE UR: CLEAR
AST SERPL-CCNC: 21 U/L (ref 15–37)
BACTERIA URNS QL MICRO: NEGATIVE /HPF
BARBITURATES UR QL SCN: NEGATIVE
BASOPHILS # BLD: 0.04 K/UL (ref 0–0.2)
BASOPHILS NFR BLD: 0.5 % (ref 0–2)
BENZODIAZ UR QL: NEGATIVE
BILIRUB SERPL-MCNC: 0.4 MG/DL (ref 0–1.2)
BILIRUB UR QL: NEGATIVE
BUN SERPL-MCNC: 10 MG/DL (ref 6–23)
CALCIUM SERPL-MCNC: 9 MG/DL (ref 8.8–10.2)
CANNABINOIDS UR QL SCN: NEGATIVE
CASTS URNS QL MICRO: 0 /LPF
CHLORIDE SERPL-SCNC: 100 MMOL/L (ref 98–107)
CO2 SERPL-SCNC: 25 MMOL/L (ref 20–29)
COCAINE UR QL SCN: NEGATIVE
COLOR UR: ABNORMAL
CREAT SERPL-MCNC: 0.74 MG/DL (ref 0.8–1.3)
CRYSTALS URNS QL MICRO: 0 /LPF
DIFFERENTIAL METHOD BLD: ABNORMAL
EOSINOPHIL # BLD: 0.16 K/UL (ref 0–0.8)
EOSINOPHIL NFR BLD: 1.9 % (ref 0.5–7.8)
EPI CELLS #/AREA URNS HPF: ABNORMAL /HPF
ERYTHROCYTE [DISTWIDTH] IN BLOOD BY AUTOMATED COUNT: 12.1 % (ref 11.9–14.6)
ETHANOL SERPL-MCNC: <11 MG/DL (ref 0–0.08)
GLOBULIN SER CALC-MCNC: 3.1 G/DL (ref 2.3–3.5)
GLUCOSE SERPL-MCNC: 267 MG/DL (ref 70–99)
GLUCOSE UR STRIP.AUTO-MCNC: 500 MG/DL
HCT VFR BLD AUTO: 41 % (ref 41.1–50.3)
HGB BLD-MCNC: 13.9 G/DL (ref 13.6–17.2)
HGB UR QL STRIP: NEGATIVE
HYALINE CASTS URNS QL MICRO: ABNORMAL /LPF
IMM GRANULOCYTES # BLD AUTO: 0.04 K/UL (ref 0–0.5)
IMM GRANULOCYTES NFR BLD AUTO: 0.5 % (ref 0–5)
KETONES UR QL STRIP.AUTO: ABNORMAL MG/DL
LEUKOCYTE ESTERASE UR QL STRIP.AUTO: NEGATIVE
LYMPHOCYTES # BLD: 1.87 K/UL (ref 0.5–4.6)
LYMPHOCYTES NFR BLD: 22.5 % (ref 13–44)
MCH RBC QN AUTO: 29.5 PG (ref 26.1–32.9)
MCHC RBC AUTO-ENTMCNC: 33.9 G/DL (ref 31.4–35)
MCV RBC AUTO: 87 FL (ref 82–102)
METHADONE UR QL: NEGATIVE
MONOCYTES # BLD: 0.38 K/UL (ref 0.1–1.3)
MONOCYTES NFR BLD: 4.6 % (ref 4–12)
MUCOUS THREADS URNS QL MICRO: 0 /LPF
NEUTS SEG # BLD: 5.81 K/UL (ref 1.7–8.2)
NEUTS SEG NFR BLD: 70 % (ref 43–78)
NITRITE UR QL STRIP.AUTO: NEGATIVE
NRBC # BLD: 0 K/UL (ref 0–0.2)
OPIATES UR QL: NEGATIVE
PCP UR QL: NEGATIVE
PH UR STRIP: 5 (ref 5–9)
PLATELET # BLD AUTO: 210 K/UL (ref 150–450)
PMV BLD AUTO: 9.6 FL (ref 9.4–12.3)
POTASSIUM SERPL-SCNC: 4.3 MMOL/L (ref 3.5–5.1)
PROT SERPL-MCNC: 6.8 G/DL (ref 6.3–8.2)
PROT UR STRIP-MCNC: NEGATIVE MG/DL
RBC # BLD AUTO: 4.71 M/UL (ref 4.23–5.6)
RBC #/AREA URNS HPF: ABNORMAL /HPF
SODIUM SERPL-SCNC: 136 MMOL/L (ref 136–145)
SP GR UR REFRACTOMETRY: 1.03 (ref 1–1.02)
URINE CULTURE IF INDICATED: ABNORMAL
UROBILINOGEN UR QL STRIP.AUTO: 0.2 EU/DL (ref 0.2–1)
WBC # BLD AUTO: 8.3 K/UL (ref 4.3–11.1)
WBC URNS QL MICRO: ABNORMAL /HPF

## 2025-04-27 PROCEDURE — 99285 EMERGENCY DEPT VISIT HI MDM: CPT

## 2025-04-27 PROCEDURE — 71101 X-RAY EXAM UNILAT RIBS/CHEST: CPT

## 2025-04-27 PROCEDURE — 85025 COMPLETE CBC W/AUTO DIFF WBC: CPT

## 2025-04-27 PROCEDURE — 2580000003 HC RX 258: Performed by: EMERGENCY MEDICINE

## 2025-04-27 PROCEDURE — 96361 HYDRATE IV INFUSION ADD-ON: CPT

## 2025-04-27 PROCEDURE — 96374 THER/PROPH/DIAG INJ IV PUSH: CPT

## 2025-04-27 PROCEDURE — 82077 ASSAY SPEC XCP UR&BREATH IA: CPT

## 2025-04-27 PROCEDURE — 80053 COMPREHEN METABOLIC PANEL: CPT

## 2025-04-27 PROCEDURE — 6370000000 HC RX 637 (ALT 250 FOR IP): Performed by: EMERGENCY MEDICINE

## 2025-04-27 PROCEDURE — 80307 DRUG TEST PRSMV CHEM ANLYZR: CPT

## 2025-04-27 PROCEDURE — 81001 URINALYSIS AUTO W/SCOPE: CPT

## 2025-04-27 PROCEDURE — 6360000002 HC RX W HCPCS: Performed by: EMERGENCY MEDICINE

## 2025-04-27 RX ORDER — KETOROLAC TROMETHAMINE 30 MG/ML
30 INJECTION, SOLUTION INTRAMUSCULAR; INTRAVENOUS
Status: COMPLETED | OUTPATIENT
Start: 2025-04-27 | End: 2025-04-27

## 2025-04-27 RX ORDER — VENLAFAXINE HYDROCHLORIDE 150 MG/1
150 CAPSULE, EXTENDED RELEASE ORAL
COMMUNITY
Start: 2025-04-23

## 2025-04-27 RX ORDER — 0.9 % SODIUM CHLORIDE 0.9 %
1000 INTRAVENOUS SOLUTION INTRAVENOUS
Status: COMPLETED | OUTPATIENT
Start: 2025-04-27 | End: 2025-04-27

## 2025-04-27 RX ORDER — INSULIN GLARGINE 100 [IU]/ML
30 INJECTION, SOLUTION SUBCUTANEOUS NIGHTLY
Status: DISCONTINUED | OUTPATIENT
Start: 2025-04-27 | End: 2025-04-28 | Stop reason: HOSPADM

## 2025-04-27 RX ORDER — TRAZODONE HYDROCHLORIDE 50 MG/1
150 TABLET ORAL NIGHTLY
Status: DISCONTINUED | OUTPATIENT
Start: 2025-04-27 | End: 2025-04-28 | Stop reason: HOSPADM

## 2025-04-27 RX ORDER — VENLAFAXINE HYDROCHLORIDE 150 MG/1
150 CAPSULE, EXTENDED RELEASE ORAL
Status: DISCONTINUED | OUTPATIENT
Start: 2025-04-28 | End: 2025-04-28 | Stop reason: HOSPADM

## 2025-04-27 RX ADMIN — TRAZODONE HYDROCHLORIDE 150 MG: 50 TABLET ORAL at 21:37

## 2025-04-27 RX ADMIN — KETOROLAC TROMETHAMINE 30 MG: 30 INJECTION, SOLUTION INTRAMUSCULAR at 14:03

## 2025-04-27 RX ADMIN — INSULIN GLARGINE 30 UNITS: 100 INJECTION, SOLUTION SUBCUTANEOUS at 21:37

## 2025-04-27 RX ADMIN — SODIUM CHLORIDE 1000 ML: 0.9 INJECTION, SOLUTION INTRAVENOUS at 14:04

## 2025-04-27 ASSESSMENT — PAIN DESCRIPTION - LOCATION
LOCATION: RIB CAGE

## 2025-04-27 ASSESSMENT — PAIN - FUNCTIONAL ASSESSMENT: PAIN_FUNCTIONAL_ASSESSMENT: 0-10

## 2025-04-27 ASSESSMENT — PAIN SCALES - GENERAL
PAINLEVEL_OUTOF10: 8
PAINLEVEL_OUTOF10: 7
PAINLEVEL_OUTOF10: 7

## 2025-04-27 ASSESSMENT — PAIN DESCRIPTION - ORIENTATION
ORIENTATION: LEFT

## 2025-04-27 NOTE — ED TRIAGE NOTES
Pt advises that he injured the R side of his ribs approx x4 weeks ago after falling approx x10 feet. Pt was cleared [facility]. Pt was told by a nurse at inpatient psych facility that his ribs feel dislocated.    Pt CO SOB when lying down.    Pt also states he has been depressed recently and that he had thoughts of harming himself approx x4 hours PTA. Pt denies having a plan or intent. Pt was placed on new psych meds a few days ago and states he has lost most of his medications. Pt states he tried to OD on metformin approx x2 weeks ago.

## 2025-04-28 NOTE — ED NOTES
Constant Observer No   Constant Observer Oriented no   High risk patients are in line of sight at all times No - low risk   Excess equipment/medical supplies not necessary for the care of the patient removed No - low risk   All sharp or dangerous objects are removed from room: including but not limited to belts, pens & pencils, needles, medications, cosmetics, lighters, matches, nail files, watches, necklaces, glass objects, razors, razor blades, knives, aerosol sprays, drawstring pants, shoes, cords (telephone, call bells, etc.) cleaning wipes or other cleaning items, aluminum cans, not permanently attached wall décor No - low risk   Telephone/cell phone removed as well as TV remote (batteries can be swallowed) No - low risk    Patient belongings removed and labeled at nurses station No - low risk    Excess linen is removed from room No - low risk    All plastic bags are removed from the room and replaced with paper trash bags No - low risk   Patient is in paper scrubs or appropriate gown and using hospital socks with rubber soles No - low risk    No metal, hard eating utensils or hard plates are on meal tray No - low risk    Remove all cleaning agents used by Environmental Services No - low risk    If Crucifix is hanging on a nail, remove Crucifix as well as the nail No - low risk        *If any question above is answered \"No,\" documentation is required.

## 2025-04-28 NOTE — ED PROVIDER NOTES
Emergency Department Provider Signout / Continuation of Care Note         DISPOSITION Behavioral Health Hold 04/27/2025 02:54:54 PM       ICD-10-CM    1. Depression with suicidal ideation  F32.A     R45.851           The patient's care was signed out to me at shift change.      Final Plan      Patient was signed out at shift change.  He was pending evaluation by psychiatry at that time.  Psychiatry did evaluate and made recommendation of psychiatric admission.  Patient has been accepted to [facility] by Dr. Mcknight.                                   Huy Henley,   04/27/25 0606    
[facility], [facility]    Social Connection and Isolation Panel [NHANES]     Frequency of Communication with Friends and Family: More than three times a week     Frequency of Social Gatherings with Friends and Family: More than three times a week     Attends Nondenominational Services: Never     Active Member of Clubs or Organizations: Yes     Attends Club or Organization Meetings: More than 4 times per year     Marital Status:    Intimate Partner Violence: Unknown (3/1/2025)    Received from [facility]    Humiliation, Afraid, Rape, and Kick questionnaire     Fear of Current or Ex-Partner: No   Housing Stability: High Risk (3/18/2025)    Received from [facility]    Housing Stability Vital Sign     Unable to Pay for Housing in the Last Year: No     Number of Times Moved in the Last Year: 3     Homeless in the Last Year: Yes        Previous Medications    ASPIRIN 81 MG EC TABLET    Take 1 tablet by mouth daily    ATORVASTATIN (LIPITOR) 40 MG TABLET    Take 1 tablet by mouth nightly    BLOOD GLUCOSE MONITOR KIT AND SUPPLIES    Dispense sufficient amount for indicated testing frequency plus additional to accommodate PRN testing needs. Dispense all needed supplies to include: monitor, strips, lancing device, lancets, control solutions, alcohol swabs.    DESOXIMETASONE (TOPICORT LP) 0.05 % OINT OITMENT    Apply topically to psoriasis lesions once daily/    EPINEPHRINE (EPIPEN 2-JOVITA) 0.3 MG/0.3ML SOAJ INJECTION    Use as directed for allergic reaction    GABAPENTIN (NEURONTIN) 300 MG CAPSULE    Take 1 capsule by mouth 3 times daily for 90 days.    IBUPROFEN (ADVIL;MOTRIN) 600 MG TABLET    Take 1 tablet by mouth 3 times daily as needed for Pain    INSULIN GLARGINE (LANTUS SOLOSTAR) 100 UNIT/ML INJECTION PEN    Inject 30 Units into the skin nightly    INSULIN PEN NEEDLE 32G X 4 MM MISC    1 each by Does not apply route daily

## 2025-04-28 NOTE — ED NOTES
I have reviewed discharge instructions with the patient.  The patient verbalized understanding.    Patient left ED via Discharge Method: transport team to in pt pych with transport team.    Opportunity for questions and clarification provided.       Patient given 0 scripts.         To continue your aftercare when you leave the hospital, you may receive an automated call from our care team to check in on how you are doing.  This is a free service and part of our promise to provide the best care and service to meet your aftercare needs.” If you have questions, or wish to unsubscribe from this service please call [phone].  Thank you for Choosing our [facility] Emergency Department.

## 2025-05-04 ENCOUNTER — APPOINTMENT (OUTPATIENT)
Dept: GENERAL RADIOLOGY | Age: 50
End: 2025-05-04
Payer: MEDICARE

## 2025-05-04 ENCOUNTER — HOSPITAL ENCOUNTER (EMERGENCY)
Age: 50
Discharge: HOME | End: 2025-05-04
Attending: EMERGENCY MEDICINE
Payer: MEDICARE

## 2025-05-04 VITALS
TEMPERATURE: 98.6 F | RESPIRATION RATE: 16 BRPM | DIASTOLIC BLOOD PRESSURE: 88 MMHG | HEART RATE: 90 BPM | OXYGEN SATURATION: 94 % | SYSTOLIC BLOOD PRESSURE: 138 MMHG

## 2025-05-04 DIAGNOSIS — M54.42 CHRONIC LOW BACK PAIN WITH BILATERAL SCIATICA, UNSPECIFIED BACK PAIN LATERALITY: ICD-10-CM

## 2025-05-04 DIAGNOSIS — M54.41 CHRONIC LOW BACK PAIN WITH BILATERAL SCIATICA, UNSPECIFIED BACK PAIN LATERALITY: ICD-10-CM

## 2025-05-04 DIAGNOSIS — G89.29 CHRONIC LOW BACK PAIN WITH BILATERAL SCIATICA, UNSPECIFIED BACK PAIN LATERALITY: ICD-10-CM

## 2025-05-04 DIAGNOSIS — Z59.819 HOUSING INSTABILITY: Primary | ICD-10-CM

## 2025-05-04 LAB
ALBUMIN SERPL-MCNC: 3.7 G/DL (ref 3.5–5)
ALBUMIN/GLOB SERPL: 1 (ref 1–1.9)
ALP SERPL-CCNC: 102 U/L (ref 40–129)
ALT SERPL-CCNC: 21 U/L (ref 8–55)
ANION GAP SERPL CALC-SCNC: 16 MMOL/L (ref 7–16)
AST SERPL-CCNC: 31 U/L (ref 15–37)
BASOPHILS # BLD: 0.04 K/UL (ref 0–0.2)
BASOPHILS NFR BLD: 0.5 % (ref 0–2)
BILIRUB SERPL-MCNC: 0.4 MG/DL (ref 0–1.2)
BUN SERPL-MCNC: 15 MG/DL (ref 6–23)
CALCIUM SERPL-MCNC: 9 MG/DL (ref 8.8–10.2)
CHLORIDE SERPL-SCNC: 99 MMOL/L (ref 98–107)
CO2 SERPL-SCNC: 22 MMOL/L (ref 20–29)
CREAT SERPL-MCNC: 0.76 MG/DL (ref 0.8–1.3)
DIFFERENTIAL METHOD BLD: NORMAL
EOSINOPHIL # BLD: 0.08 K/UL (ref 0–0.8)
EOSINOPHIL NFR BLD: 0.9 % (ref 0.5–7.8)
ERYTHROCYTE [DISTWIDTH] IN BLOOD BY AUTOMATED COUNT: 12.6 % (ref 11.9–14.6)
ETHANOL SERPL-MCNC: <11 MG/DL (ref 0–0.08)
GLOBULIN SER CALC-MCNC: 3.6 G/DL (ref 2.3–3.5)
GLUCOSE SERPL-MCNC: 272 MG/DL (ref 70–99)
HCT VFR BLD AUTO: 42 % (ref 41.1–50.3)
HGB BLD-MCNC: 14.1 G/DL (ref 13.6–17.2)
IMM GRANULOCYTES # BLD AUTO: 0.05 K/UL (ref 0–0.5)
IMM GRANULOCYTES NFR BLD AUTO: 0.6 % (ref 0–5)
LYMPHOCYTES # BLD: 1.79 K/UL (ref 0.5–4.6)
LYMPHOCYTES NFR BLD: 20.5 % (ref 13–44)
MAGNESIUM SERPL-MCNC: 1.8 MG/DL (ref 1.8–2.4)
MCH RBC QN AUTO: 28.5 PG (ref 26.1–32.9)
MCHC RBC AUTO-ENTMCNC: 33.6 G/DL (ref 31.4–35)
MCV RBC AUTO: 85 FL (ref 82–102)
MONOCYTES # BLD: 0.6 K/UL (ref 0.1–1.3)
MONOCYTES NFR BLD: 6.9 % (ref 4–12)
NEUTS SEG # BLD: 6.16 K/UL (ref 1.7–8.2)
NEUTS SEG NFR BLD: 70.6 % (ref 43–78)
NRBC # BLD: 0 K/UL (ref 0–0.2)
PLATELET # BLD AUTO: 212 K/UL (ref 150–450)
PMV BLD AUTO: 10 FL (ref 9.4–12.3)
POTASSIUM SERPL-SCNC: 4.7 MMOL/L (ref 3.5–5.1)
PROT SERPL-MCNC: 7.3 G/DL (ref 6.3–8.2)
RBC # BLD AUTO: 4.94 M/UL (ref 4.23–5.6)
SODIUM SERPL-SCNC: 137 MMOL/L (ref 136–145)
TROPONIN T SERPL HS-MCNC: 13.5 NG/L (ref 0–22)
TROPONIN T SERPL HS-MCNC: 15.3 NG/L (ref 0–22)
WBC # BLD AUTO: 8.7 K/UL (ref 4.3–11.1)

## 2025-05-04 PROCEDURE — 83735 ASSAY OF MAGNESIUM: CPT

## 2025-05-04 PROCEDURE — 96375 TX/PRO/DX INJ NEW DRUG ADDON: CPT

## 2025-05-04 PROCEDURE — 80053 COMPREHEN METABOLIC PANEL: CPT

## 2025-05-04 PROCEDURE — 72100 X-RAY EXAM L-S SPINE 2/3 VWS: CPT

## 2025-05-04 PROCEDURE — 71046 X-RAY EXAM CHEST 2 VIEWS: CPT

## 2025-05-04 PROCEDURE — 84484 ASSAY OF TROPONIN QUANT: CPT

## 2025-05-04 PROCEDURE — 99285 EMERGENCY DEPT VISIT HI MDM: CPT

## 2025-05-04 PROCEDURE — 85025 COMPLETE CBC W/AUTO DIFF WBC: CPT

## 2025-05-04 PROCEDURE — 6370000000 HC RX 637 (ALT 250 FOR IP): Performed by: EMERGENCY MEDICINE

## 2025-05-04 PROCEDURE — 93005 ELECTROCARDIOGRAM TRACING: CPT | Performed by: EMERGENCY MEDICINE

## 2025-05-04 PROCEDURE — 82077 ASSAY SPEC XCP UR&BREATH IA: CPT

## 2025-05-04 PROCEDURE — 6360000002 HC RX W HCPCS: Performed by: EMERGENCY MEDICINE

## 2025-05-04 PROCEDURE — 96374 THER/PROPH/DIAG INJ IV PUSH: CPT

## 2025-05-04 RX ORDER — ONDANSETRON 2 MG/ML
4 INJECTION INTRAMUSCULAR; INTRAVENOUS
Status: COMPLETED | OUTPATIENT
Start: 2025-05-04 | End: 2025-05-04

## 2025-05-04 RX ORDER — METHOCARBAMOL 750 MG/1
750 TABLET, FILM COATED ORAL 3 TIMES DAILY
Qty: 15 TABLET | Refills: 0 | Status: SHIPPED | OUTPATIENT
Start: 2025-05-04 | End: 2025-05-09

## 2025-05-04 RX ORDER — KETOROLAC TROMETHAMINE 30 MG/ML
30 INJECTION, SOLUTION INTRAMUSCULAR; INTRAVENOUS
Status: COMPLETED | OUTPATIENT
Start: 2025-05-04 | End: 2025-05-04

## 2025-05-04 RX ORDER — LIDOCAINE 4 G/G
1 PATCH TOPICAL
Status: DISCONTINUED | OUTPATIENT
Start: 2025-05-04 | End: 2025-05-05 | Stop reason: HOSPADM

## 2025-05-04 RX ORDER — LIDOCAINE 4 G/G
1 PATCH TOPICAL DAILY
Qty: 12 EACH | Refills: 0 | Status: SHIPPED | OUTPATIENT
Start: 2025-05-04 | End: 2025-05-16

## 2025-05-04 RX ADMIN — KETOROLAC TROMETHAMINE 30 MG: 30 INJECTION, SOLUTION INTRAMUSCULAR at 19:07

## 2025-05-04 RX ADMIN — ONDANSETRON 4 MG: 2 INJECTION, SOLUTION INTRAMUSCULAR; INTRAVENOUS at 19:08

## 2025-05-04 SDOH — ECONOMIC STABILITY - HOUSING INSECURITY: HOUSING INSTABILITY UNSPECIFIED: Z59.819

## 2025-05-04 ASSESSMENT — PAIN SCALES - GENERAL
PAINLEVEL_OUTOF10: 10
PAINLEVEL_OUTOF10: 8
PAINLEVEL_OUTOF10: 10
PAINLEVEL_OUTOF10: 10

## 2025-05-04 ASSESSMENT — PAIN DESCRIPTION - LOCATION: LOCATION: BACK

## 2025-05-04 ASSESSMENT — PAIN DESCRIPTION - ORIENTATION: ORIENTATION: LOWER

## 2025-05-04 NOTE — ED PROVIDER NOTES
Troponin   Result Value Ref Range    Troponin T 13.5 0 - 22 ng/L   Troponin   Result Value Ref Range    Troponin T 15.3 0 - 22 ng/L   EKG 12 Lead   Result Value Ref Range    Ventricular Rate 96 BPM    Atrial Rate 96 BPM    P-R Interval 167 ms    QRS Duration 108 ms    Q-T Interval 366 ms    QTc Calculation (Bazett) 463 ms    P Axis 132 degrees    R Axis 151 degrees    T Axis 73 degrees    Diagnosis       Right and left arm electrode reversal, interpretation assumes no reversal  Sinus rhythm  Probable lateral infarct, age indeterminate  Baseline wander in lead(s) I III aVR aVL aVF V1 V2 V3 V4 V5 V6           XR LUMBAR SPINE (2-3 VIEWS)   Final Result   1. No acute osseous abnormality is seen.    2. Multilevel degenerative changes of the lumbar spine, as described above.   3. L4-S1 anterior posterior fusion. No evidence of hardware failure or   loosening.         Electronically signed by Jose Luis Matthew      XR CHEST (2 VW)   Final Result   No acute pulmonary process is seen.         Electronically signed by Jose Luis Matthew                   No results for input(s): \"COVID19\" in the last 72 hours.     Voice dictation software was used during the making of this note.  This software is not perfect and grammatical and other typographical errors may be present.  This note has not been completely proofread for errors.     Narendra Redmond Jr., MD  05/05/25 0139

## 2025-05-04 NOTE — ED NOTES
Constant Observer Yes - Name: Security   Constant Observer Oriented yes   High risk patients are in line of sight at all times Yes   Excess equipment/medical supplies not necessary for the care of the patient removed Yes   All sharp or dangerous objects are removed from room: including but not limited to belts, pens & pencils, needles, medications, cosmetics, lighters, matches, nail files, watches, necklaces, glass objects, razors, razor blades, knives, aerosol sprays, drawstring pants, shoes, cords (telephone, call bells, etc.) cleaning wipes or other cleaning items, aluminum cans, not permanently attached wall décor Yes   Telephone/cell phone removed as well as TV remote (batteries can be swallowed) Yes   Patient belongings removed and labeled at nurses station Yes   Excess linen is removed from room Yes   All plastic bags are removed from the room and replaced with paper trash bags Yes   Patient is in paper scrubs or appropriate gown and using hospital socks with rubber soles Yes   No metal, hard eating utensils or hard plates are on meal tray Yes   Remove all cleaning agents used by Environmental Services Yes   If Crucifix is hanging on a nail, remove Crucifix as well as the nail Yes       *If any question above is answered \"No,\" documentation is required.

## 2025-05-04 NOTE — ED TRIAGE NOTES
Pt c/o lower back pain down the middle, needles and pins sensation down right leg. PMH of fused disk, diabetes and htn. Pt is from [facility]. Pt was given 100 mcg of fentanyl for back pain. /85, . Pt actively expressing thoughts about wanting to go back to [facility] before he hurts himself.

## 2025-05-05 LAB
EKG ATRIAL RATE: 96 BPM
EKG DIAGNOSIS: NORMAL
EKG P AXIS: 132 DEGREES
EKG P-R INTERVAL: 167 MS
EKG Q-T INTERVAL: 366 MS
EKG QRS DURATION: 108 MS
EKG QTC CALCULATION (BAZETT): 463 MS
EKG R AXIS: 151 DEGREES
EKG T AXIS: 73 DEGREES
EKG VENTRICULAR RATE: 96 BPM

## 2025-05-05 PROCEDURE — 93010 ELECTROCARDIOGRAM REPORT: CPT | Performed by: INTERNAL MEDICINE

## 2025-05-05 NOTE — DISCHARGE INSTR - COC
current use of insulin ([facility]) E11.65, Z79.4    History of medication noncompliance Z91.148    Dyslipidemia E78.5    Morbid obesity ([facility]) E66.01    Tobacco dependency F17.200    Spinal stenosis of lumbar region with neurogenic claudication M48.062    Status post lumbar spinal fusion Z98.1    Fatty liver K76.0    Psoriasis L40.9    Persistent depressive disorder F34.1    Anxiety F41.9    Chronic midline low back pain without sciatica M54.50, G89.29    History of MI (myocardial infarction) I25.2       Isolation/Infection:   Isolation            No Isolation          Patient Infection Status    None to display              Nurse Assessment:  Last Vital Signs: BP (!) 103/90   Pulse 93   Temp 98.6 °F (37 °C) (Oral)   Resp 14   SpO2 96%     Last documented pain score (0-10 scale): Pain Level: 8  Last Weight:   Wt Readings from Last 1 Encounters:   04/27/25 111.6 kg (246 lb)     Mental Status:  {IP PT MENTAL STATUS:20030}    IV Access:  { WILLARD IV ACCESS:703122624}    Nursing Mobility/ADLs:  Walking   {CHP DME ADLs:535137162}  Transfer  {CHP DME ADLs:462378907}  Bathing  {CHP DME ADLs:373685819}  Dressing  {CHP DME ADLs:460221877}  Toileting  {CHP DME ADLs:472837583}  Feeding  {P DME ADLs:115498351}  Med Admin  {P DME ADLs:011593439}  Med Delivery   { WILLARD MED Delivery:425949934}    Wound Care Documentation and Therapy:        Elimination:  Continence:   Bowel: {YES / NO:19727}  Bladder: {YES / NO:19727}  Urinary Catheter: {Urinary Catheter:114224331}   Colostomy/Ileostomy/Ileal Conduit: {YES / NO:19727}       Date of Last BM: ***  No intake or output data in the 24 hours ending 05/04/25 2311  No intake/output data recorded.    Safety Concerns:     { WILLARD Safety Concerns:218498529}    Impairments/Disabilities:      {[facility] Impairments/Disabilities:447313345}    Nutrition Therapy:  Current Nutrition Therapy:   {[facility] Diet List:904884993}    Routes of Feeding: {[facility] DME Other Feedings:640539187}  Liquids: {Slp liquid

## 2025-05-14 ENCOUNTER — TELEPHONE (OUTPATIENT)
Dept: FAMILY MEDICINE CLINIC | Facility: CLINIC | Age: 50
End: 2025-05-14

## 2025-05-14 NOTE — TELEPHONE ENCOUNTER
----- Message from GARO Polanco sent at 12/23/2024 10:21 AM EST -----  Regarding: RE: Needs labs  Can you reach out to him to try to get him rescheduled?    GARO Polanco  ----- Message -----  From: Roya Pastrana MA  Sent: 12/20/2024   3:44 PM EST  To: GARO Polanco  Subject: RE: Needs labs                                   Patient canceled appt through [employer].  ----- Message -----  From: Jacey Anne PA  Sent: 12/19/2024   3:28 PM EST  To: Roya Pastrana MA  Subject: Needs labs                                       Pt did not have labs drawn prior to his last appt w/ me and did not come in to have them drawn following the visit as agreed.  Please arrange for him to come in for his labs prior to his visit w/ me next Monday. I must have his labs in order to provide appropriate med refills. If he cannot come in before Monday for his labs, please reschedule his appointments.

## 2025-05-16 ENCOUNTER — HOSPITAL ENCOUNTER (EMERGENCY)
Age: 50
Discharge: HOME | End: 2025-05-16
Attending: EMERGENCY MEDICINE
Payer: MEDICARE

## 2025-05-16 ENCOUNTER — APPOINTMENT (OUTPATIENT)
Dept: GENERAL RADIOLOGY | Age: 50
End: 2025-05-16
Payer: MEDICARE

## 2025-05-16 VITALS
HEIGHT: 73 IN | BODY MASS INDEX: 32.74 KG/M2 | RESPIRATION RATE: 21 BRPM | WEIGHT: 247 LBS | OXYGEN SATURATION: 93 % | TEMPERATURE: 98.7 F | DIASTOLIC BLOOD PRESSURE: 71 MMHG | SYSTOLIC BLOOD PRESSURE: 128 MMHG | HEART RATE: 89 BPM

## 2025-05-16 DIAGNOSIS — I10 HYPERTENSION, UNSPECIFIED TYPE: ICD-10-CM

## 2025-05-16 DIAGNOSIS — N39.0 ACUTE UTI: ICD-10-CM

## 2025-05-16 DIAGNOSIS — R07.9 CHEST PAIN, UNSPECIFIED TYPE: Primary | ICD-10-CM

## 2025-05-16 LAB
ALBUMIN SERPL-MCNC: 3.9 G/DL (ref 3.5–5)
ALBUMIN/GLOB SERPL: 1 (ref 1–1.9)
ALP SERPL-CCNC: 116 U/L (ref 40–129)
ALT SERPL-CCNC: 20 U/L (ref 8–55)
ANION GAP SERPL CALC-SCNC: 14 MMOL/L (ref 7–16)
APPEARANCE UR: CLEAR
AST SERPL-CCNC: 32 U/L (ref 15–37)
BACTERIA URNS QL MICRO: NEGATIVE /HPF
BASOPHILS # BLD: 0.05 K/UL (ref 0–0.2)
BASOPHILS NFR BLD: 0.5 % (ref 0–2)
BILIRUB SERPL-MCNC: 0.3 MG/DL (ref 0–1.2)
BILIRUB UR QL: NEGATIVE
BUN SERPL-MCNC: 10 MG/DL (ref 6–23)
CALCIUM SERPL-MCNC: 9.2 MG/DL (ref 8.8–10.2)
CHLORIDE SERPL-SCNC: 100 MMOL/L (ref 98–107)
CO2 SERPL-SCNC: 22 MMOL/L (ref 20–29)
COLOR UR: ABNORMAL
CREAT SERPL-MCNC: 1 MG/DL (ref 0.8–1.3)
D DIMER PPP FEU-MCNC: <0.27 UG/ML(FEU)
DIFFERENTIAL METHOD BLD: NORMAL
EKG ATRIAL RATE: 101 BPM
EKG DIAGNOSIS: NORMAL
EKG P AXIS: 47 DEGREES
EKG P-R INTERVAL: 157 MS
EKG Q-T INTERVAL: 338 MS
EKG QRS DURATION: 96 MS
EKG QTC CALCULATION (BAZETT): 439 MS
EKG R AXIS: 60 DEGREES
EKG VENTRICULAR RATE: 101 BPM
EOSINOPHIL # BLD: 0.21 K/UL (ref 0–0.8)
EOSINOPHIL NFR BLD: 1.9 % (ref 0.5–7.8)
EPI CELLS #/AREA URNS HPF: ABNORMAL /HPF
ERYTHROCYTE [DISTWIDTH] IN BLOOD BY AUTOMATED COUNT: 12.4 % (ref 11.9–14.6)
GLOBULIN SER CALC-MCNC: 3.8 G/DL (ref 2.3–3.5)
GLUCOSE SERPL-MCNC: 263 MG/DL (ref 70–99)
GLUCOSE UR STRIP.AUTO-MCNC: >1000 MG/DL
HCT VFR BLD AUTO: 46.3 % (ref 41.1–50.3)
HGB BLD-MCNC: 15.7 G/DL (ref 13.6–17.2)
HGB UR QL STRIP: NEGATIVE
HYALINE CASTS URNS QL MICRO: ABNORMAL /LPF
IMM GRANULOCYTES # BLD AUTO: 0.04 K/UL (ref 0–0.5)
IMM GRANULOCYTES NFR BLD AUTO: 0.4 % (ref 0–5)
KETONES UR QL STRIP.AUTO: ABNORMAL MG/DL
LEUKOCYTE ESTERASE UR QL STRIP.AUTO: ABNORMAL
LIPASE SERPL-CCNC: 16 U/L (ref 13–60)
LYMPHOCYTES # BLD: 2.71 K/UL (ref 0.5–4.6)
LYMPHOCYTES NFR BLD: 25.1 % (ref 13–44)
MCH RBC QN AUTO: 29 PG (ref 26.1–32.9)
MCHC RBC AUTO-ENTMCNC: 33.9 G/DL (ref 31.4–35)
MCV RBC AUTO: 85.4 FL (ref 82–102)
MONOCYTES # BLD: 0.71 K/UL (ref 0.1–1.3)
MONOCYTES NFR BLD: 6.6 % (ref 4–12)
NEUTS SEG # BLD: 7.08 K/UL (ref 1.7–8.2)
NEUTS SEG NFR BLD: 65.5 % (ref 43–78)
NITRITE UR QL STRIP.AUTO: NEGATIVE
NRBC # BLD: 0 K/UL (ref 0–0.2)
PH UR STRIP: 5.5 (ref 5–9)
PLATELET # BLD AUTO: 247 K/UL (ref 150–450)
PLATELET COMMENT: ADEQUATE
PMV BLD AUTO: 10.2 FL (ref 9.4–12.3)
POTASSIUM SERPL-SCNC: 4.7 MMOL/L (ref 3.5–5.1)
PROT SERPL-MCNC: 7.7 G/DL (ref 6.3–8.2)
PROT UR STRIP-MCNC: ABNORMAL MG/DL
RBC # BLD AUTO: 5.42 M/UL (ref 4.23–5.6)
RBC #/AREA URNS HPF: ABNORMAL /HPF
SODIUM SERPL-SCNC: 136 MMOL/L (ref 136–145)
SP GR UR REFRACTOMETRY: 1.03 (ref 1–1.02)
TROPONIN T SERPL HS-MCNC: 16.7 NG/L (ref 0–22)
TROPONIN T SERPL HS-MCNC: 17.3 NG/L (ref 0–22)
UROBILINOGEN UR QL STRIP.AUTO: 0.2 EU/DL (ref 0.2–1)
WBC # BLD AUTO: 10.8 K/UL (ref 4.3–11.1)
WBC URNS QL MICRO: ABNORMAL /HPF

## 2025-05-16 PROCEDURE — 81001 URINALYSIS AUTO W/SCOPE: CPT

## 2025-05-16 PROCEDURE — 93005 ELECTROCARDIOGRAM TRACING: CPT | Performed by: EMERGENCY MEDICINE

## 2025-05-16 PROCEDURE — 99285 EMERGENCY DEPT VISIT HI MDM: CPT

## 2025-05-16 PROCEDURE — 84484 ASSAY OF TROPONIN QUANT: CPT

## 2025-05-16 PROCEDURE — 83690 ASSAY OF LIPASE: CPT

## 2025-05-16 PROCEDURE — 80053 COMPREHEN METABOLIC PANEL: CPT

## 2025-05-16 PROCEDURE — 71045 X-RAY EXAM CHEST 1 VIEW: CPT

## 2025-05-16 PROCEDURE — 93010 ELECTROCARDIOGRAM REPORT: CPT | Performed by: INTERNAL MEDICINE

## 2025-05-16 PROCEDURE — 6370000000 HC RX 637 (ALT 250 FOR IP): Performed by: EMERGENCY MEDICINE

## 2025-05-16 PROCEDURE — 85025 COMPLETE CBC W/AUTO DIFF WBC: CPT

## 2025-05-16 PROCEDURE — 85379 FIBRIN DEGRADATION QUANT: CPT

## 2025-05-16 RX ORDER — SULFAMETHOXAZOLE AND TRIMETHOPRIM 800; 160 MG/1; MG/1
1 TABLET ORAL 2 TIMES DAILY
Qty: 20 TABLET | Refills: 0 | Status: SHIPPED | OUTPATIENT
Start: 2025-05-16 | End: 2025-05-26

## 2025-05-16 RX ORDER — ASPIRIN 325 MG
325 TABLET ORAL
Status: DISCONTINUED | OUTPATIENT
Start: 2025-05-16 | End: 2025-05-16 | Stop reason: HOSPADM

## 2025-05-16 RX ORDER — SULFAMETHOXAZOLE AND TRIMETHOPRIM 800; 160 MG/1; MG/1
1 TABLET ORAL
Status: COMPLETED | OUTPATIENT
Start: 2025-05-16 | End: 2025-05-16

## 2025-05-16 RX ORDER — CLONIDINE HYDROCHLORIDE 0.1 MG/1
0.1 TABLET ORAL
Status: COMPLETED | OUTPATIENT
Start: 2025-05-16 | End: 2025-05-16

## 2025-05-16 RX ADMIN — SULFAMETHOXAZOLE AND TRIMETHOPRIM 1 TABLET: 800; 160 TABLET ORAL at 04:59

## 2025-05-16 RX ADMIN — CLONIDINE HYDROCHLORIDE 0.1 MG: 0.1 TABLET ORAL at 03:25

## 2025-05-16 ASSESSMENT — LIFESTYLE VARIABLES: HOW OFTEN DO YOU HAVE A DRINK CONTAINING ALCOHOL: NEVER

## 2025-05-16 NOTE — DISCHARGE INSTRUCTIONS
If you have increasing pain, difficulty breathing, pass out, or if you have any other concerning symptoms, please return to the ER immediately.

## 2025-05-16 NOTE — ED PROVIDER NOTES
Emergency Department Provider Note       [facility]Y DEPT   PCP: Zabrina, Pcp   Age: 49 y.o.   Sex: male     DISPOSITION Decision To Discharge 05/16/2025 04:54:23 AM    ICD-10-CM    1. Chest pain, unspecified type  R07.9       2. Hypertension, unspecified type  I10       3. Acute UTI  N39.0           Medical Decision Making     Patient comes to the ED for evaluation of central chest pain that radiates to his left arm and neck bilaterally.  Patient states pain began approximately 1 hour prior to arrival.  Patient without N/V.  No hypoxia present.  Patient without respiratory distress.    EKG with sinus tachycardia, no ST changes.    Patient declines aspirin, stating he took 1 prior to arrival.  Given 0.1 mg of clonidine for BP.  CBC without leukocytosis or anemia.  CMP unremarkable.  Lipase within normal limits.  D-dimer negative.  (Given this finding, do not suspect PE or aortic dissection).  Troponin negative x 2.  Chest x-ray unremarkable.  UA with UTI.  Patient denies penile DC or suspicion for gonorrhea/chlamydia.  Will treat with Bactrim.  First dose given in the ED, prescription sent to patient's pharmacy.  Patient stable for DC at this time.  Strict return precautions discussed.     1 acute illness with systemic symptoms.    Over the counter drug management performed.  Prescription drug management performed.  Shared medical decision making was utilized in creating the patients health plan today.    I independently ordered and reviewed each unique test.    I reviewed external records: ED visit note from a different ED.      ED cardiac monitoring rhythm strip was ordered and interpreted:  sinus rhythm, no evidence of an arrhythmia  ST Segments:Normal ST segments - NO STEMI   Rate: 101    I interpreted the X-rays CXR without pneumonia, pneumothorax or infiltrate.    ED provider's independent EKG interpretation NSR      History     Patient comes to the ED for evaluation of central chest pain that radiates to his

## 2025-06-22 ENCOUNTER — HOSPITAL ENCOUNTER (EMERGENCY)
Age: 50
Discharge: HOME | End: 2025-06-23
Attending: EMERGENCY MEDICINE
Payer: MEDICARE

## 2025-06-22 DIAGNOSIS — Z59.819 HOUSING INSTABILITY: ICD-10-CM

## 2025-06-22 DIAGNOSIS — F39 MOOD DISORDER: Primary | ICD-10-CM

## 2025-06-22 LAB
AMPHET UR QL SCN: NEGATIVE
ANION GAP SERPL CALC-SCNC: 16 MMOL/L (ref 7–16)
APAP SERPL-MCNC: <5 UG/ML (ref 10–30)
APPEARANCE UR: CLEAR
BASOPHILS # BLD: 0.02 K/UL (ref 0–0.2)
BASOPHILS NFR BLD: 0.2 % (ref 0–2)
BENZODIAZ UR QL: NEGATIVE
BILIRUB UR QL: NEGATIVE
BUN SERPL-MCNC: 10 MG/DL (ref 6–23)
CALCIUM SERPL-MCNC: 9.4 MG/DL (ref 8.8–10.2)
CANNABINOIDS UR QL SCN: NEGATIVE
CHLORIDE SERPL-SCNC: 97 MMOL/L (ref 98–107)
CO2 SERPL-SCNC: 22 MMOL/L (ref 20–29)
COCAINE UR QL SCN: NEGATIVE
COLOR UR: ABNORMAL
CREAT SERPL-MCNC: 0.75 MG/DL (ref 0.8–1.3)
DIFFERENTIAL METHOD BLD: ABNORMAL
EOSINOPHIL # BLD: 0.04 K/UL (ref 0–0.8)
EOSINOPHIL NFR BLD: 0.5 % (ref 0.5–7.8)
ERYTHROCYTE [DISTWIDTH] IN BLOOD BY AUTOMATED COUNT: 12.8 % (ref 11.9–14.6)
ETHANOL SERPL-MCNC: <11 MG/DL (ref 0–0.08)
GLUCOSE SERPL-MCNC: 226 MG/DL (ref 70–99)
GLUCOSE UR STRIP.AUTO-MCNC: >1000 MG/DL
HCT VFR BLD AUTO: 41.6 % (ref 41.1–50.3)
HGB BLD-MCNC: 15 G/DL (ref 13.6–17.2)
HGB UR QL STRIP: NEGATIVE
IMM GRANULOCYTES # BLD AUTO: 0.02 K/UL (ref 0–0.5)
IMM GRANULOCYTES NFR BLD AUTO: 0.2 % (ref 0–5)
KETONES UR QL STRIP.AUTO: 15 MG/DL
LEUKOCYTE ESTERASE UR QL STRIP.AUTO: NEGATIVE
LYMPHOCYTES # BLD: 1.94 K/UL (ref 0.5–4.6)
LYMPHOCYTES NFR BLD: 23.4 % (ref 13–44)
Lab: NORMAL
MCH RBC QN AUTO: 29.3 PG (ref 26.1–32.9)
MCHC RBC AUTO-ENTMCNC: 36.1 G/DL (ref 31.4–35)
MCV RBC AUTO: 81.3 FL (ref 82–102)
MONOCYTES # BLD: 0.77 K/UL (ref 0.1–1.3)
MONOCYTES NFR BLD: 9.3 % (ref 4–12)
NEUTS SEG # BLD: 5.5 K/UL (ref 1.7–8.2)
NEUTS SEG NFR BLD: 66.4 % (ref 43–78)
NITRITE UR QL STRIP.AUTO: NEGATIVE
NRBC # BLD: 0 K/UL (ref 0–0.2)
OPIATES UR QL: NEGATIVE
PH UR STRIP: 6.5 (ref 5–9)
PLATELET # BLD AUTO: 228 K/UL (ref 150–450)
PMV BLD AUTO: 9.6 FL (ref 9.4–12.3)
POTASSIUM SERPL-SCNC: 3.9 MMOL/L (ref 3.5–5.1)
PROT UR STRIP-MCNC: NEGATIVE MG/DL
RBC # BLD AUTO: 5.12 M/UL (ref 4.23–5.6)
SALICYLATES SERPL-MCNC: <0.5 MG/DL
SODIUM SERPL-SCNC: 135 MMOL/L (ref 136–145)
SP GR UR REFRACTOMETRY: 1.02 (ref 1–1.02)
UROBILINOGEN UR QL STRIP.AUTO: 0.2 EU/DL (ref 0.2–1)
WBC # BLD AUTO: 8.3 K/UL (ref 4.3–11.1)

## 2025-06-22 PROCEDURE — 81003 URINALYSIS AUTO W/O SCOPE: CPT

## 2025-06-22 PROCEDURE — 80048 BASIC METABOLIC PNL TOTAL CA: CPT

## 2025-06-22 PROCEDURE — 99285 EMERGENCY DEPT VISIT HI MDM: CPT

## 2025-06-22 PROCEDURE — 80307 DRUG TEST PRSMV CHEM ANLYZR: CPT

## 2025-06-22 PROCEDURE — 82077 ASSAY SPEC XCP UR&BREATH IA: CPT

## 2025-06-22 PROCEDURE — 80143 DRUG ASSAY ACETAMINOPHEN: CPT

## 2025-06-22 PROCEDURE — 85025 COMPLETE CBC W/AUTO DIFF WBC: CPT

## 2025-06-22 PROCEDURE — 80179 DRUG ASSAY SALICYLATE: CPT

## 2025-06-22 PROCEDURE — 6370000000 HC RX 637 (ALT 250 FOR IP): Performed by: EMERGENCY MEDICINE

## 2025-06-22 RX ORDER — INSULIN GLARGINE 100 [IU]/ML
30 INJECTION, SOLUTION SUBCUTANEOUS NIGHTLY
Status: DISCONTINUED | OUTPATIENT
Start: 2025-06-22 | End: 2025-06-23 | Stop reason: HOSPADM

## 2025-06-22 RX ORDER — FLUOXETINE 10 MG/1
20 CAPSULE ORAL DAILY
Status: DISCONTINUED | OUTPATIENT
Start: 2025-06-22 | End: 2025-06-23 | Stop reason: HOSPADM

## 2025-06-22 RX ADMIN — METFORMIN HYDROCHLORIDE 500 MG: 500 TABLET ORAL at 23:40

## 2025-06-22 RX ADMIN — INSULIN GLARGINE 30 UNITS: 100 INJECTION, SOLUTION SUBCUTANEOUS at 23:41

## 2025-06-22 RX ADMIN — FLUOXETINE HYDROCHLORIDE 20 MG: 10 CAPSULE ORAL at 23:40

## 2025-06-22 SDOH — ECONOMIC STABILITY - HOUSING INSECURITY: HOUSING INSTABILITY UNSPECIFIED: Z59.819

## 2025-06-22 ASSESSMENT — PAIN SCALES - GENERAL: PAINLEVEL_OUTOF10: 8

## 2025-06-22 ASSESSMENT — PAIN DESCRIPTION - LOCATION: LOCATION: BACK

## 2025-06-22 ASSESSMENT — PAIN DESCRIPTION - DESCRIPTORS: DESCRIPTORS: ACHING

## 2025-06-22 ASSESSMENT — PAIN - FUNCTIONAL ASSESSMENT: PAIN_FUNCTIONAL_ASSESSMENT: 0-10

## 2025-06-23 VITALS
OXYGEN SATURATION: 98 % | BODY MASS INDEX: 31.14 KG/M2 | HEIGHT: 73 IN | RESPIRATION RATE: 16 BRPM | DIASTOLIC BLOOD PRESSURE: 87 MMHG | TEMPERATURE: 98.1 F | WEIGHT: 235 LBS | HEART RATE: 88 BPM | SYSTOLIC BLOOD PRESSURE: 138 MMHG

## 2025-06-23 LAB
GLUCOSE BLD STRIP.AUTO-MCNC: 177 MG/DL (ref 65–100)
SERVICE CMNT-IMP: ABNORMAL

## 2025-06-23 PROCEDURE — 6370000000 HC RX 637 (ALT 250 FOR IP): Performed by: EMERGENCY MEDICINE

## 2025-06-23 PROCEDURE — 82962 GLUCOSE BLOOD TEST: CPT

## 2025-06-23 RX ORDER — ACETAMINOPHEN 500 MG
1000 TABLET ORAL ONCE
Status: COMPLETED | OUTPATIENT
Start: 2025-06-23 | End: 2025-06-23

## 2025-06-23 RX ORDER — NAPROXEN 250 MG/1
500 TABLET ORAL
Status: COMPLETED | OUTPATIENT
Start: 2025-06-23 | End: 2025-06-23

## 2025-06-23 RX ADMIN — NAPROXEN 500 MG: 250 TABLET ORAL at 09:07

## 2025-06-23 RX ADMIN — ACETAMINOPHEN 1000 MG: 500 TABLET, FILM COATED ORAL at 02:18

## 2025-06-23 RX ADMIN — FLUOXETINE HYDROCHLORIDE 20 MG: 10 CAPSULE ORAL at 08:02

## 2025-06-23 RX ADMIN — METFORMIN HYDROCHLORIDE 500 MG: 500 TABLET ORAL at 08:02

## 2025-06-23 ASSESSMENT — PAIN SCALES - GENERAL
PAINLEVEL_OUTOF10: 10
PAINLEVEL_OUTOF10: 8
PAINLEVEL_OUTOF10: 5
PAINLEVEL_OUTOF10: 8

## 2025-06-23 ASSESSMENT — PAIN DESCRIPTION - LOCATION
LOCATION: BACK

## 2025-06-23 ASSESSMENT — PAIN DESCRIPTION - DESCRIPTORS
DESCRIPTORS: ACHING

## 2025-06-23 ASSESSMENT — PAIN DESCRIPTION - ORIENTATION: ORIENTATION: LOWER

## 2025-06-23 ASSESSMENT — PAIN - FUNCTIONAL ASSESSMENT: PAIN_FUNCTIONAL_ASSESSMENT: 0-10

## 2025-06-23 NOTE — ED NOTES
Patient resting at this time. No signs or symptoms of distress. Respirations even and unlabored. Sitter at bedside. Safety precautions in place.      Alondra Ly, RN  06/23/25 0351

## 2025-06-23 NOTE — CONSULTS
Positives/Negatives  Musculoskeletal comments: chronic back pain  Endocrine: All Negative  Hematologic, Lymphatic: All Negative  Integumentary: All Negative  Allergy, Immunology: All Negative    MENTAL STATUS EXAM    Sensorium/Alertness: Awake    Orientation:  Date, Place , Person, Situation    Appearance: Does not appear stated age  Psychomotor Activity: Retardation, Slowed  Abnormal Behaviors: None  Attitude Towards Examiner: Defensive, Evasive  Eye Contact: Fair  Speech: Hesitant, Impoverished/Poor  Mood: Depressed    Affect:  Blunted, Restricted/Constricted    Perception: WNL  Thought Process: Logical/Clear/Goal Oriented  Association: Intact    Describe suicidal thought content:  death wishes .earlier today wanted death by police    Thought Content: Homicidal: Denies  Thought Content: Delusions: Denies  Impulse Control : Grossly intact  Concentration/Calculation/Attention Span: WNL    How was the patient's Concentration/Calculation/Attention Tested/Assessed?:  Per observation and interview with patient    Recent Memory: WNL  How was the patient's Recent Memory Tested/Assessed?: Recent Events/Interview  Language: WNL    How was the patient tested/assessed:  Per observation and interview with patient    Remote Memory: WNL    How was the patient's Remote Memory Tested/Assessed?:  Past events, as it relates to history    Intelligence/Fund of Knowledge: Average    How was the patient's Intelligence/Fund of Knowledge Tested/Assessed?:  Based on history, Based on vocabulary, syntax, grammar, and content    Judgement: Fair      How was the patient's Judgement Tested/Assessed?:  Per patient's behavior/history of present illness    Insight: Fair      How was the patient's Insight Tested/Assessed?:  Understanding of severity of illness/history of present illness     SUICIDE RISK ASSESSMENT    Suicide Risk Factors:  Mood or psychotic disorder, Personality disorder, Impulsivity, Chronic pain or medical illness    Suicide

## 2025-06-23 NOTE — CARE COORDINATION
Chart review complete, CM met with pt at bedside, pt was found laying on stretcher alert and oriented x 4, pt states he is currently homeless and has been homeless for the past 2 weeks since his girlfriend kicked him out of the place they were living, when asked about his income he states \"she took my money and then kicked me out\". Pt states he has no money to pay for housing at this time. Pt presented to [facility] ED with C/O of SI/HI with no specific plan. Pt with hx of depression. Pt states after his last stay at [facility] approx 2 weeks ago they assisted with finding him a boarding home to stay but per pt it was nasty and he did not want to stay there. Pt is requesting assistance with housing and medications, CM has explained to him that assistance with medications will not be possible d/t he has insurance. Pt is aware of the shelter times for admission and states he went last pm and they told him to come back this am but has missed the time. Pt was seen by Tele-psych and was instructed to wait in ED until AM CM could assist with housing. Pt has no money and does not want to return to a boarding home. CM is unable to assist with housing. Per psych notes pt is currently at baseline and will be dc to attempted to get a bed at one of the local shelters. Pt is agreeable to dc plan. Per notes pt is non compliant with follow up and outpt medications.  Pt is homeless, has insurance and no current PCP.    No further dc needs.       06/23/25 0915   Service Assessment   Patient Orientation Alert and Oriented   Cognition Alert   History Provided By Patient   Primary Caregiver Self   Accompanied By/Relationship none   Support Systems None   Patient's Healthcare Decision Maker is: Legal Next of Kin   PCP Verified by CM Yes  (no currently)   Prior Functional Level Independent in ADLs/IADLs   Current Functional Level Independent in ADLs/IADLs   Can patient return to prior living arrangement Unknown at present   Ability to make needs

## 2025-06-23 NOTE — DISCHARGE INSTRUCTIONS
Follow-up with local shelter as discussed with case management  Continue all your current medications  Drink plenty of fluids    Call your doctor or the follow up doctor to set up appointment for recheck visit  We would love to help you get a primary care doctor for follow-up after your emergency department visit.    Please call [phone] between 7AM - 6PM Monday to Friday.  A care navigator will be able to assist you with setting up a doctor close to your home.       Return to ER for any worsening symptoms or new problems which may arise

## 2025-06-23 NOTE — ED TRIAGE NOTES
Arrived via [facility], homeless, picked up [place], States he wants to die by police. Reports he had a recent loss of relationship. Was recently seen at [facility] for SI. Kalina HI.

## 2025-06-23 NOTE — ED NOTES
Patient mobility status ambulates with no difficulty.     I have reviewed discharge instructions with the patient.  The patient verbalized understanding.    Patient left ED via Discharge Method: ambulatory to Home with roundtrip.    Opportunity for questions and clarification provided.     Patient given 0 scripts.            Vamshi Serrano, RN  06/23/25 0933

## 2025-06-23 NOTE — ED NOTES
Constant Observer Yes - Name: Kishor with security   Constant Observer Oriented yes   High risk patients are in line of sight at all times Yes   Excess equipment/medical supplies not necessary for the care of the patient removed Yes   All sharp or dangerous objects are removed from room: including but not limited to belts, pens & pencils, needles, medications, cosmetics, lighters, matches, nail files, watches, necklaces, glass objects, razors, razor blades, knives, aerosol sprays, drawstring pants, shoes, cords (telephone, call bells, etc.) cleaning wipes or other cleaning items, aluminum cans, not permanently attached wall décor Yes   Telephone/cell phone removed as well as TV remote (batteries can be swallowed) Yes   Patient belongings removed and labeled at nurses station Yes   Excess linen is removed from room Yes   All plastic bags are removed from the room and replaced with paper trash bags Yes   Patient is in paper scrubs or appropriate gown and using hospital socks with rubber soles Yes   No metal, hard eating utensils or hard plates are on meal tray Yes   Remove all cleaning agents used by Environmental Services Yes   If Crucifix is hanging on a nail, remove Crucifix as well as the nail Yes       *If any question above is answered \"No,\" documentation is required.       Ольга August RN  06/22/25 2019       Ольга August RN  06/22/25 2051

## 2025-06-23 NOTE — ED PROVIDER NOTES
Emergency Department Provider Signout / Continuation of Care Note         DISPOSITION Behavioral Health Hold 06/22/2025 11:07:57 PM       ICD-10-CM    1. Depression with suicidal ideation  F32.A     R45.851       2. Housing instability  Z59.819           The patient's care was signed out to me at shift change.      Final Plan      Assumed care of patient from Dr. Benitez at shift change  Patient resting comfortably.  No events overnight  Reviewed report from telepsychiatry  Medications ordered on previous shift  Will monitor patient's blood sugar  Await reassessment by face-to-face psychiatry team today     9:14 AM EDT  Patient evaluated by case management  At this point patient has completed his psychiatric evaluation and was deemed to be stable for discharge  Patient given further information about housing shelters  Advised close follow-up with mental health                               Jonathan Sanches MD  06/23/25 0708       Jonathan Sanches MD  06/23/25 5803    
allergic reaction    GABAPENTIN (NEURONTIN) 300 MG CAPSULE    Take 1 capsule by mouth 3 times daily for 90 days.    IBUPROFEN (ADVIL;MOTRIN) 600 MG TABLET    Take 1 tablet by mouth 3 times daily as needed for Pain    INSULIN GLARGINE (LANTUS SOLOSTAR) 100 UNIT/ML INJECTION PEN    Inject 30 Units into the skin nightly    INSULIN PEN NEEDLE 32G X 4 MM MISC    1 each by Does not apply route daily    METFORMIN (GLUCOPHAGE) 1000 MG TABLET    Take 1 tablet by mouth 2 times daily    NAPROXEN SODIUM (ANAPROX) 550 MG TABLET    Take 1 tablet by mouth 2 times daily (with meals)    NITROGLYCERIN (NITROLINGUAL) 0.4 MG/SPRAY 0.4 MG SPRAY    Place 1 spray under the tongue Per Patient reports taking dissolvable pill    TRAZODONE (DESYREL) 150 MG TABLET    Take 1 tablet by mouth nightly as needed for Sleep    VENLAFAXINE (EFFEXOR XR) 150 MG EXTENDED RELEASE CAPSULE    1 capsule        Results from this emergency department visit:      Results for orders placed or performed during the hospital encounter of 06/22/25   Basic Metabolic Panel   Result Value Ref Range    Sodium 135 (L) 136 - 145 mmol/L    Potassium 3.9 3.5 - 5.1 mmol/L    Chloride 97 (L) 98 - 107 mmol/L    CO2 22 20 - 29 mmol/L    Anion Gap 16 7 - 16 mmol/L    Glucose 226 (H) 70 - 99 mg/dL    BUN 10 6 - 23 MG/DL    Creatinine 0.75 (L) 0.80 - 1.30 MG/DL    Est, Glom Filt Rate >90 >60 ml/min/1.73m2    Calcium 9.4 8.8 - 10.2 MG/DL   CBC with Auto Differential   Result Value Ref Range    WBC 8.3 4.3 - 11.1 K/uL    RBC 5.12 4.23 - 5.6 M/uL    Hemoglobin 15.0 13.6 - 17.2 g/dL    Hematocrit 41.6 41.1 - 50.3 %    MCV 81.3 (L) 82 - 102 FL    MCH 29.3 26.1 - 32.9 PG    MCHC 36.1 (H) 31.4 - 35.0 g/dL    RDW 12.8 11.9 - 14.6 %    Platelets 228 150 - 450 K/uL    MPV 9.6 9.4 - 12.3 FL    nRBC 0.00 0.0 - 0.2 K/uL    Differential Type AUTOMATED      Neutrophils % 66.4 43.0 - 78.0 %    Lymphocytes % 23.4 13.0 - 44.0 %    Monocytes % 9.3 4.0 - 12.0 %    Eosinophils % 0.5 0.5 - 7.8 %

## 2025-06-23 NOTE — ED NOTES
Constant Observer Yes - Name: Fatoumata Gomez Observer Oriented yes   High risk patients are in line of sight at all times Yes   Excess equipment/medical supplies not necessary for the care of the patient removed Yes   All sharp or dangerous objects are removed from room: including but not limited to belts, pens & pencils, needles, medications, cosmetics, lighters, matches, nail files, watches, necklaces, glass objects, razors, razor blades, knives, aerosol sprays, drawstring pants, shoes, cords (telephone, call bells, etc.) cleaning wipes or other cleaning items, aluminum cans, not permanently attached wall décor Yes   Telephone/cell phone removed as well as TV remote (batteries can be swallowed) Yes   Patient belongings removed and labeled at nurses station Yes   Excess linen is removed from room Yes   All plastic bags are removed from the room and replaced with paper trash bags Yes   Patient is in paper scrubs or appropriate gown and using hospital socks with rubber soles Yes   No metal, hard eating utensils or hard plates are on meal tray Yes   Remove all cleaning agents used by Environmental Services Yes   If Crucifix is hanging on a nail, remove Crucifix as well as the nail Yes       *If any question above is answered \"No,\" documentation is required.       Vamshi Serrano RN  06/23/25 9082

## 2025-07-10 ENCOUNTER — TELEPHONE (OUTPATIENT)
Dept: FAMILY MEDICINE CLINIC | Facility: CLINIC | Age: 50
End: 2025-07-10

## 2025-07-10 NOTE — TELEPHONE ENCOUNTER
----- Message from GARO Polanco sent at 12/23/2024 10:21 AM EST -----  Regarding: RE: Needs labs  Can you reach out to him to try to get him rescheduled?    GARO Polanco  ----- Message -----  From: Roya Pastrana MA  Sent: 12/20/2024   3:44 PM EST  To: GARO Polanco  Subject: RE: Needs labs                                   Patient canceled appt through [email or web address].  ----- Message -----  From: Jacey Anne PA  Sent: 12/19/2024   3:28 PM EST  To: Roya Pastrana MA  Subject: Needs labs                                       Pt did not have labs drawn prior to his last appt w/ me and did not come in to have them drawn following the visit as agreed.  Please arrange for him to come in for his labs prior to his visit w/ me next Monday. I must have his labs in order to provide appropriate med refills. If he cannot come in before Monday for his labs, please reschedule his appointments.

## 2025-08-19 ENCOUNTER — APPOINTMENT (OUTPATIENT)
Dept: GENERAL RADIOLOGY | Age: 50
End: 2025-08-19
Payer: MEDICARE

## 2025-08-19 ENCOUNTER — HOSPITAL ENCOUNTER (EMERGENCY)
Age: 50
Discharge: PSYCHIATRIC OR SUBSTANCE USE FACILITY | End: 2025-08-20
Attending: EMERGENCY MEDICINE
Payer: MEDICARE

## 2025-08-19 DIAGNOSIS — E11.65 HYPERGLYCEMIA DUE TO DIABETES MELLITUS (HCC): ICD-10-CM

## 2025-08-19 DIAGNOSIS — F32.A DEPRESSION WITH SUICIDAL IDEATION: Primary | ICD-10-CM

## 2025-08-19 DIAGNOSIS — F41.1 ANXIETY STATE: ICD-10-CM

## 2025-08-19 DIAGNOSIS — R07.9 CHEST PAIN, UNSPECIFIED TYPE: ICD-10-CM

## 2025-08-19 DIAGNOSIS — R45.851 DEPRESSION WITH SUICIDAL IDEATION: Primary | ICD-10-CM

## 2025-08-19 LAB
ALBUMIN SERPL-MCNC: 3.8 G/DL (ref 3.5–5)
ALBUMIN/GLOB SERPL: 1.3 (ref 1–1.9)
ALP SERPL-CCNC: 110 U/L (ref 40–129)
ALT SERPL-CCNC: 23 U/L (ref 8–55)
AMPHET UR QL SCN: NEGATIVE
ANION GAP SERPL CALC-SCNC: 14 MMOL/L (ref 7–16)
APAP SERPL-MCNC: <5 UG/ML (ref 10–30)
AST SERPL-CCNC: 20 U/L (ref 15–37)
BARBITURATES UR QL SCN: NEGATIVE
BASOPHILS # BLD: 0.04 K/UL (ref 0–0.2)
BASOPHILS NFR BLD: 0.5 % (ref 0–2)
BENZODIAZ UR QL: NEGATIVE
BILIRUB SERPL-MCNC: 0.5 MG/DL (ref 0–1.2)
BUN SERPL-MCNC: 13 MG/DL (ref 6–23)
CALCIUM SERPL-MCNC: 8.7 MG/DL (ref 8.8–10.2)
CANNABINOIDS UR QL SCN: NEGATIVE
CHLORIDE SERPL-SCNC: 101 MMOL/L (ref 98–107)
CO2 SERPL-SCNC: 21 MMOL/L (ref 20–29)
COCAINE UR QL SCN: NEGATIVE
CREAT SERPL-MCNC: 0.98 MG/DL (ref 0.8–1.3)
DIFFERENTIAL METHOD BLD: NORMAL
EKG ATRIAL RATE: 94 BPM
EKG DIAGNOSIS: NORMAL
EKG P AXIS: 50 DEGREES
EKG P-R INTERVAL: 159 MS
EKG Q-T INTERVAL: 348 MS
EKG QRS DURATION: 98 MS
EKG QTC CALCULATION (BAZETT): 433 MS
EKG R AXIS: 48 DEGREES
EKG T AXIS: 78 DEGREES
EKG VENTRICULAR RATE: 93 BPM
EOSINOPHIL # BLD: 0.05 K/UL (ref 0–0.8)
EOSINOPHIL NFR BLD: 0.6 % (ref 0.5–7.8)
ERYTHROCYTE [DISTWIDTH] IN BLOOD BY AUTOMATED COUNT: 12.7 % (ref 11.9–14.6)
ETHANOL SERPL-MCNC: <11 MG/DL (ref 0–0.08)
FENTANYL: NEGATIVE
FLUAV RNA SPEC QL NAA+PROBE: NOT DETECTED
FLUBV RNA SPEC QL NAA+PROBE: NOT DETECTED
GLOBULIN SER CALC-MCNC: 3 G/DL (ref 2.3–3.5)
GLUCOSE BLD STRIP.AUTO-MCNC: 239 MG/DL (ref 65–100)
GLUCOSE BLD STRIP.AUTO-MCNC: 265 MG/DL (ref 65–100)
GLUCOSE SERPL-MCNC: 447 MG/DL (ref 70–99)
HCT VFR BLD AUTO: 42.3 % (ref 41.1–50.3)
HGB BLD-MCNC: 14 G/DL (ref 13.6–17.2)
IMM GRANULOCYTES # BLD AUTO: 0.03 K/UL (ref 0–0.5)
IMM GRANULOCYTES NFR BLD AUTO: 0.3 % (ref 0–5)
LYMPHOCYTES # BLD: 1.45 K/UL (ref 0.5–4.6)
LYMPHOCYTES NFR BLD: 16.4 % (ref 13–44)
Lab: NORMAL
MCH RBC QN AUTO: 28.1 PG (ref 26.1–32.9)
MCHC RBC AUTO-ENTMCNC: 33.1 G/DL (ref 31.4–35)
MCV RBC AUTO: 84.8 FL (ref 82–102)
METHADONE UR QL: NEGATIVE
MONOCYTES # BLD: 0.54 K/UL (ref 0.1–1.3)
MONOCYTES NFR BLD: 6.1 % (ref 4–12)
NEUTS SEG # BLD: 6.74 K/UL (ref 1.7–8.2)
NEUTS SEG NFR BLD: 76.1 % (ref 43–78)
NRBC # BLD: 0 K/UL (ref 0–0.2)
OPIATES UR QL: NEGATIVE
PCP UR QL: NEGATIVE
PH UR: 5.1 (ref 4.5–8)
PLATELET # BLD AUTO: 219 K/UL (ref 150–450)
PMV BLD AUTO: 9.7 FL (ref 9.4–12.3)
POTASSIUM SERPL-SCNC: 4.6 MMOL/L (ref 3.5–5.1)
PROT SERPL-MCNC: 6.8 G/DL (ref 6.3–8.2)
RBC # BLD AUTO: 4.99 M/UL (ref 4.23–5.6)
SALICYLATES SERPL-MCNC: <0.5 MG/DL
SARS-COV-2 RDRP RESP QL NAA+PROBE: NOT DETECTED
SERVICE CMNT-IMP: ABNORMAL
SERVICE CMNT-IMP: ABNORMAL
SODIUM SERPL-SCNC: 136 MMOL/L (ref 136–145)
SOURCE: NORMAL
TROPONIN T SERPL HS-MCNC: 13.6 NG/L (ref 0–22)
TROPONIN T SERPL HS-MCNC: 16 NG/L (ref 0–22)
WBC # BLD AUTO: 8.9 K/UL (ref 4.3–11.1)

## 2025-08-19 PROCEDURE — 6370000000 HC RX 637 (ALT 250 FOR IP): Performed by: EMERGENCY MEDICINE

## 2025-08-19 PROCEDURE — 82962 GLUCOSE BLOOD TEST: CPT

## 2025-08-19 PROCEDURE — 2580000003 HC RX 258: Performed by: EMERGENCY MEDICINE

## 2025-08-19 PROCEDURE — 87636 SARSCOV2 & INF A&B AMP PRB: CPT

## 2025-08-19 PROCEDURE — S5553 INSULIN LONG ACTING 5 U: HCPCS | Performed by: EMERGENCY MEDICINE

## 2025-08-19 PROCEDURE — 80179 DRUG ASSAY SALICYLATE: CPT

## 2025-08-19 PROCEDURE — 96360 HYDRATION IV INFUSION INIT: CPT

## 2025-08-19 PROCEDURE — 84484 ASSAY OF TROPONIN QUANT: CPT

## 2025-08-19 PROCEDURE — 71045 X-RAY EXAM CHEST 1 VIEW: CPT

## 2025-08-19 PROCEDURE — 82077 ASSAY SPEC XCP UR&BREATH IA: CPT

## 2025-08-19 PROCEDURE — 93010 ELECTROCARDIOGRAM REPORT: CPT | Performed by: INTERNAL MEDICINE

## 2025-08-19 PROCEDURE — 80143 DRUG ASSAY ACETAMINOPHEN: CPT

## 2025-08-19 PROCEDURE — 85025 COMPLETE CBC W/AUTO DIFF WBC: CPT

## 2025-08-19 PROCEDURE — 99285 EMERGENCY DEPT VISIT HI MDM: CPT

## 2025-08-19 PROCEDURE — 93005 ELECTROCARDIOGRAM TRACING: CPT | Performed by: EMERGENCY MEDICINE

## 2025-08-19 PROCEDURE — 80053 COMPREHEN METABOLIC PANEL: CPT

## 2025-08-19 PROCEDURE — 6370000000 HC RX 637 (ALT 250 FOR IP): Performed by: STUDENT IN AN ORGANIZED HEALTH CARE EDUCATION/TRAINING PROGRAM

## 2025-08-19 RX ORDER — ACETAMINOPHEN 500 MG
1000 TABLET ORAL
Status: COMPLETED | OUTPATIENT
Start: 2025-08-19 | End: 2025-08-19

## 2025-08-19 RX ORDER — 0.9 % SODIUM CHLORIDE 0.9 %
1000 INTRAVENOUS SOLUTION INTRAVENOUS ONCE
Status: COMPLETED | OUTPATIENT
Start: 2025-08-19 | End: 2025-08-19

## 2025-08-19 RX ORDER — INSULIN GLARGINE-YFGN 100 [IU]/ML
16 INJECTION, SOLUTION SUBCUTANEOUS NIGHTLY
Status: DISCONTINUED | OUTPATIENT
Start: 2025-08-19 | End: 2025-08-20 | Stop reason: HOSPADM

## 2025-08-19 RX ADMIN — ACETAMINOPHEN 1000 MG: 500 TABLET, FILM COATED ORAL at 23:28

## 2025-08-19 RX ADMIN — METFORMIN HYDROCHLORIDE 1000 MG: 500 TABLET ORAL at 21:09

## 2025-08-19 RX ADMIN — SERTRALINE 50 MG: 50 TABLET, FILM COATED ORAL at 21:09

## 2025-08-19 RX ADMIN — SODIUM CHLORIDE 1000 ML: 0.9 INJECTION, SOLUTION INTRAVENOUS at 17:50

## 2025-08-19 RX ADMIN — INSULIN GLARGINE-YFGN 16 UNITS: 100 INJECTION, SOLUTION SUBCUTANEOUS at 21:09

## 2025-08-19 ASSESSMENT — ENCOUNTER SYMPTOMS
COUGH: 0
NAUSEA: 0
ABDOMINAL PAIN: 0
DIARRHEA: 0
SHORTNESS OF BREATH: 0
BACK PAIN: 0
SORE THROAT: 0
VOMITING: 0

## 2025-08-19 ASSESSMENT — PAIN DESCRIPTION - LOCATION: LOCATION: CHEST

## 2025-08-19 ASSESSMENT — PAIN SCALES - GENERAL: PAINLEVEL_OUTOF10: 5

## 2025-08-20 VITALS
RESPIRATION RATE: 15 BRPM | DIASTOLIC BLOOD PRESSURE: 78 MMHG | SYSTOLIC BLOOD PRESSURE: 124 MMHG | TEMPERATURE: 97.9 F | HEART RATE: 92 BPM | HEIGHT: 73 IN | BODY MASS INDEX: 31.54 KG/M2 | OXYGEN SATURATION: 99 % | WEIGHT: 238 LBS

## 2025-08-20 LAB
GLUCOSE BLD STRIP.AUTO-MCNC: 261 MG/DL (ref 65–100)
SERVICE CMNT-IMP: ABNORMAL

## 2025-08-20 PROCEDURE — 6370000000 HC RX 637 (ALT 250 FOR IP): Performed by: EMERGENCY MEDICINE

## 2025-08-20 PROCEDURE — 82962 GLUCOSE BLOOD TEST: CPT

## 2025-08-20 RX ADMIN — METFORMIN HYDROCHLORIDE 1000 MG: 500 TABLET ORAL at 07:36

## 2025-08-20 RX ADMIN — SERTRALINE 50 MG: 50 TABLET, FILM COATED ORAL at 07:36

## (undated) DEVICE — GUIDEWIRE 035IN 210CM PTFE COAT FIX COR J TIP 15MM FIRM BODY

## (undated) DEVICE — GLIDESHEATH SLENDER STAINLESS STEEL KIT: Brand: GLIDESHEATH SLENDER

## (undated) DEVICE — GUIDE NDL ST W/ 14 X 147CM TELESCOPICALLY FLD CIV FLX CVR

## (undated) DEVICE — CATHETER DIAG 6FR L100CM GWIRE 0.038IN S STL POLYUR MP W/ 2

## (undated) DEVICE — DEVICE COMPR LNG 27 CM VASC BND